# Patient Record
Sex: MALE | Race: WHITE | Employment: OTHER | URBAN - METROPOLITAN AREA
[De-identification: names, ages, dates, MRNs, and addresses within clinical notes are randomized per-mention and may not be internally consistent; named-entity substitution may affect disease eponyms.]

---

## 2017-02-07 ENCOUNTER — OFFICE VISIT (OUTPATIENT)
Dept: CARDIOLOGY CLINIC | Age: 57
End: 2017-02-07

## 2017-02-07 ENCOUNTER — TELEPHONE (OUTPATIENT)
Dept: INTERNAL MEDICINE CLINIC | Age: 57
End: 2017-02-07

## 2017-02-07 VITALS
SYSTOLIC BLOOD PRESSURE: 112 MMHG | HEIGHT: 72 IN | WEIGHT: 247.8 LBS | DIASTOLIC BLOOD PRESSURE: 72 MMHG | HEART RATE: 62 BPM | BODY MASS INDEX: 33.56 KG/M2

## 2017-02-07 DIAGNOSIS — F41.9 ANXIETY: ICD-10-CM

## 2017-02-07 DIAGNOSIS — I48.19 PERSISTENT ATRIAL FIBRILLATION (HCC): ICD-10-CM

## 2017-02-07 DIAGNOSIS — F41.9 ANXIETY: Primary | Chronic | ICD-10-CM

## 2017-02-07 DIAGNOSIS — I48.91 UNSPECIFIED ATRIAL FIBRILLATION (HCC): ICD-10-CM

## 2017-02-07 DIAGNOSIS — Z86.79 S/P ABLATION OF ATRIAL FIBRILLATION: ICD-10-CM

## 2017-02-07 DIAGNOSIS — I48.91 ATRIAL FIBRILLATION WITH RVR (HCC): Primary | ICD-10-CM

## 2017-02-07 DIAGNOSIS — Z98.890 S/P ABLATION OF ATRIAL FIBRILLATION: ICD-10-CM

## 2017-02-07 DIAGNOSIS — I10 ESSENTIAL HYPERTENSION: Chronic | ICD-10-CM

## 2017-02-07 DIAGNOSIS — G47.30 SLEEP APNEA, UNSPECIFIED TYPE: Chronic | ICD-10-CM

## 2017-02-07 PROCEDURE — G8427 DOCREV CUR MEDS BY ELIG CLIN: HCPCS | Performed by: INTERNAL MEDICINE

## 2017-02-07 PROCEDURE — 3017F COLORECTAL CA SCREEN DOC REV: CPT | Performed by: INTERNAL MEDICINE

## 2017-02-07 PROCEDURE — 93000 ELECTROCARDIOGRAM COMPLETE: CPT | Performed by: INTERNAL MEDICINE

## 2017-02-07 PROCEDURE — 99214 OFFICE O/P EST MOD 30 MIN: CPT | Performed by: INTERNAL MEDICINE

## 2017-02-07 PROCEDURE — G8484 FLU IMMUNIZE NO ADMIN: HCPCS | Performed by: INTERNAL MEDICINE

## 2017-02-07 PROCEDURE — 1036F TOBACCO NON-USER: CPT | Performed by: INTERNAL MEDICINE

## 2017-02-07 PROCEDURE — G8419 CALC BMI OUT NRM PARAM NOF/U: HCPCS | Performed by: INTERNAL MEDICINE

## 2017-02-07 RX ORDER — DILTIAZEM HYDROCHLORIDE 120 MG/1
120 CAPSULE, COATED, EXTENDED RELEASE ORAL DAILY
Qty: 90 CAPSULE | Refills: 3 | Status: SHIPPED | OUTPATIENT
Start: 2017-02-07 | End: 2018-02-05 | Stop reason: SDUPTHER

## 2017-02-07 RX ORDER — ALPRAZOLAM 0.5 MG/1
TABLET ORAL
Qty: 30 TABLET | Refills: 2 | Status: SHIPPED | OUTPATIENT
Start: 2017-02-07 | End: 2017-07-18 | Stop reason: SDUPTHER

## 2017-02-12 LAB
6-ACETYLMORPHINE: NOT DETECTED
7-AMINOCLONAZEPAM: NOT DETECTED
ALPHA-OH-ALPRAZOLAM: NOT DETECTED
ALPRAZOLAM: NOT DETECTED
AMPHETAMINE: NOT DETECTED
BARBITURATES: NOT DETECTED
BENZOYLECGONINE: NOT DETECTED
BUPRENORPHINE: NOT DETECTED
CARISOPRODOL: NOT DETECTED
CLONAZEPAM: NOT DETECTED
CODEINE: NOT DETECTED
CREATININE URINE: 158.9 MG/DL (ref 20–400)
DIAZEPAM: NOT DETECTED
DRUGS EXPECTED: NORMAL
EER PAIN MGT DRUG PANEL, HIGH RES/EMIT U: NORMAL
ETHYL GLUCURONIDE: PRESENT
FENTANYL: NOT DETECTED
HYDROCODONE: NOT DETECTED
HYDROMORPHONE: NOT DETECTED
LORAZEPAM: NOT DETECTED
MARIJUANA METABOLITE: PRESENT
MDA: NOT DETECTED
MDEA: NOT DETECTED
MDMA URINE: NOT DETECTED
MEPERIDINE: NOT DETECTED
METHADONE: NOT DETECTED
METHAMPHETAMINE: NOT DETECTED
METHYLPHENIDATE: NOT DETECTED
MIDAZOLAM: NOT DETECTED
MORPHINE: NOT DETECTED
NORBUPRENORPHINE, FREE: NOT DETECTED
NORDIAZEPAM: PRESENT
NORFENTANYL: NOT DETECTED
NORHYDROCODONE, URINE: NOT DETECTED
NOROXYCODONE: NOT DETECTED
NOROXYMORPHONE, URINE: NOT DETECTED
OXAZEPAM: NOT DETECTED
OXYCODONE: NOT DETECTED
OXYMORPHONE: NOT DETECTED
PAIN MANAGEMENT DRUG PANEL: NORMAL
PAIN MANAGEMENT DRUG PANEL: NORMAL
PCP: NOT DETECTED
PHENTERMINE: NOT DETECTED
PROPOXYPHENE: NOT DETECTED
TAPENTADOL, URINE: NOT DETECTED
TAPENTADOL-O-SULFATE, URINE: NOT DETECTED
TEMAZEPAM: PRESENT
TRAMADOL: NOT DETECTED
ZOLPIDEM: NOT DETECTED

## 2017-02-17 ENCOUNTER — OFFICE VISIT (OUTPATIENT)
Dept: INTERNAL MEDICINE CLINIC | Age: 57
End: 2017-02-17

## 2017-02-17 VITALS — HEART RATE: 64 BPM | SYSTOLIC BLOOD PRESSURE: 110 MMHG | DIASTOLIC BLOOD PRESSURE: 76 MMHG

## 2017-02-17 DIAGNOSIS — I10 ESSENTIAL HYPERTENSION: Primary | Chronic | ICD-10-CM

## 2017-02-17 DIAGNOSIS — Z00.00 ROUTINE ADULT HEALTH MAINTENANCE: ICD-10-CM

## 2017-02-17 DIAGNOSIS — M47.812 CERVICAL ARTHRITIS: ICD-10-CM

## 2017-02-17 DIAGNOSIS — I48.19 PERSISTENT ATRIAL FIBRILLATION (HCC): ICD-10-CM

## 2017-02-17 DIAGNOSIS — M16.11 PRIMARY OSTEOARTHRITIS OF RIGHT HIP: ICD-10-CM

## 2017-02-17 LAB — HEPATITIS C ANTIBODY INTERPRETATION: NORMAL

## 2017-02-17 PROCEDURE — 99213 OFFICE O/P EST LOW 20 MIN: CPT | Performed by: INTERNAL MEDICINE

## 2017-02-17 PROCEDURE — 1036F TOBACCO NON-USER: CPT | Performed by: INTERNAL MEDICINE

## 2017-02-17 PROCEDURE — G8484 FLU IMMUNIZE NO ADMIN: HCPCS | Performed by: INTERNAL MEDICINE

## 2017-02-17 PROCEDURE — 3017F COLORECTAL CA SCREEN DOC REV: CPT | Performed by: INTERNAL MEDICINE

## 2017-02-17 PROCEDURE — G8427 DOCREV CUR MEDS BY ELIG CLIN: HCPCS | Performed by: INTERNAL MEDICINE

## 2017-02-17 PROCEDURE — G8417 CALC BMI ABV UP PARAM F/U: HCPCS | Performed by: INTERNAL MEDICINE

## 2017-02-20 LAB — HIV-1 AND HIV-2 ANTIBODIES: NORMAL

## 2017-03-21 ENCOUNTER — HOSPITAL ENCOUNTER (OUTPATIENT)
Dept: OTHER | Age: 57
Discharge: HOME | End: 2017-03-21

## 2017-03-21 DIAGNOSIS — Z00.6 CLINICAL TRIAL EXAM: ICD-10-CM

## 2017-04-24 ENCOUNTER — OFFICE VISIT (OUTPATIENT)
Dept: INTERNAL MEDICINE CLINIC | Age: 57
End: 2017-04-24

## 2017-04-24 VITALS
DIASTOLIC BLOOD PRESSURE: 82 MMHG | WEIGHT: 241 LBS | SYSTOLIC BLOOD PRESSURE: 122 MMHG | HEART RATE: 72 BPM | BODY MASS INDEX: 32.69 KG/M2

## 2017-04-24 DIAGNOSIS — J40 BRONCHITIS: ICD-10-CM

## 2017-04-24 DIAGNOSIS — J01.00 ACUTE MAXILLARY SINUSITIS, RECURRENCE NOT SPECIFIED: Primary | ICD-10-CM

## 2017-04-24 PROCEDURE — 3017F COLORECTAL CA SCREEN DOC REV: CPT | Performed by: INTERNAL MEDICINE

## 2017-04-24 PROCEDURE — G8417 CALC BMI ABV UP PARAM F/U: HCPCS | Performed by: INTERNAL MEDICINE

## 2017-04-24 PROCEDURE — 99213 OFFICE O/P EST LOW 20 MIN: CPT | Performed by: INTERNAL MEDICINE

## 2017-04-24 PROCEDURE — 1036F TOBACCO NON-USER: CPT | Performed by: INTERNAL MEDICINE

## 2017-04-24 PROCEDURE — G8427 DOCREV CUR MEDS BY ELIG CLIN: HCPCS | Performed by: INTERNAL MEDICINE

## 2017-04-24 RX ORDER — PROMETHAZINE HYDROCHLORIDE AND PHENYLEPHRINE HYDROCHLORIDE 6.25; 5 MG/5ML; MG/5ML
5 SYRUP ORAL EVERY 6 HOURS
Qty: 280 ML | Refills: 0 | Status: SHIPPED | OUTPATIENT
Start: 2017-04-24 | End: 2017-12-01 | Stop reason: ALTCHOICE

## 2017-04-24 RX ORDER — DOXYCYCLINE HYCLATE 100 MG
100 TABLET ORAL 2 TIMES DAILY
Qty: 20 TABLET | Refills: 0 | Status: SHIPPED | OUTPATIENT
Start: 2017-04-24 | End: 2017-05-04

## 2017-05-17 RX ORDER — RIVAROXABAN 20 MG/1
TABLET, FILM COATED ORAL
Qty: 30 TABLET | Refills: 2 | Status: SHIPPED | OUTPATIENT
Start: 2017-05-17 | End: 2017-07-18 | Stop reason: SDUPTHER

## 2017-07-06 RX ORDER — LISINOPRIL 20 MG/1
TABLET ORAL
Qty: 90 TABLET | Refills: 1 | Status: SHIPPED | OUTPATIENT
Start: 2017-07-06 | End: 2018-09-11

## 2017-07-18 ENCOUNTER — OFFICE VISIT (OUTPATIENT)
Dept: CARDIOLOGY CLINIC | Age: 57
End: 2017-07-18

## 2017-07-18 VITALS
SYSTOLIC BLOOD PRESSURE: 118 MMHG | DIASTOLIC BLOOD PRESSURE: 72 MMHG | OXYGEN SATURATION: 96 % | HEART RATE: 56 BPM | BODY MASS INDEX: 32.51 KG/M2 | WEIGHT: 240 LBS | HEIGHT: 72 IN

## 2017-07-18 DIAGNOSIS — I10 ESSENTIAL HYPERTENSION: Chronic | ICD-10-CM

## 2017-07-18 DIAGNOSIS — Z86.79 S/P ABLATION OF ATRIAL FIBRILLATION: ICD-10-CM

## 2017-07-18 DIAGNOSIS — I48.91 ATRIAL FIBRILLATION WITH RVR (HCC): Primary | ICD-10-CM

## 2017-07-18 DIAGNOSIS — Z98.890 S/P ABLATION OF ATRIAL FIBRILLATION: ICD-10-CM

## 2017-07-18 DIAGNOSIS — I48.0 PAROXYSMAL ATRIAL FIBRILLATION (HCC): ICD-10-CM

## 2017-07-18 DIAGNOSIS — G47.30 SLEEP APNEA, UNSPECIFIED TYPE: Chronic | ICD-10-CM

## 2017-07-18 PROCEDURE — 3017F COLORECTAL CA SCREEN DOC REV: CPT | Performed by: INTERNAL MEDICINE

## 2017-07-18 PROCEDURE — G8417 CALC BMI ABV UP PARAM F/U: HCPCS | Performed by: INTERNAL MEDICINE

## 2017-07-18 PROCEDURE — 99214 OFFICE O/P EST MOD 30 MIN: CPT | Performed by: INTERNAL MEDICINE

## 2017-07-18 PROCEDURE — G8427 DOCREV CUR MEDS BY ELIG CLIN: HCPCS | Performed by: INTERNAL MEDICINE

## 2017-07-18 PROCEDURE — 1036F TOBACCO NON-USER: CPT | Performed by: INTERNAL MEDICINE

## 2017-07-18 PROCEDURE — 93000 ELECTROCARDIOGRAM COMPLETE: CPT | Performed by: INTERNAL MEDICINE

## 2017-07-18 RX ORDER — ASPIRIN 325 MG
325 TABLET, DELAYED RELEASE (ENTERIC COATED) ORAL DAILY
Qty: 90 TABLET | Refills: 3
Start: 2017-07-18 | End: 2020-02-06 | Stop reason: ALTCHOICE

## 2017-07-18 RX ORDER — PROPAFENONE HYDROCHLORIDE 225 MG/1
CAPSULE, EXTENDED RELEASE ORAL
Qty: 180 CAPSULE | Refills: 2 | Status: SHIPPED | OUTPATIENT
Start: 2017-07-18 | End: 2018-05-03 | Stop reason: SDUPTHER

## 2017-08-07 ENCOUNTER — TELEPHONE (OUTPATIENT)
Dept: CARDIOLOGY CLINIC | Age: 57
End: 2017-08-07

## 2017-08-18 ENCOUNTER — OFFICE VISIT (OUTPATIENT)
Dept: INTERNAL MEDICINE CLINIC | Age: 57
End: 2017-08-18

## 2017-08-18 VITALS
HEART RATE: 64 BPM | BODY MASS INDEX: 32.51 KG/M2 | DIASTOLIC BLOOD PRESSURE: 78 MMHG | SYSTOLIC BLOOD PRESSURE: 134 MMHG | HEIGHT: 72 IN | WEIGHT: 240 LBS

## 2017-08-18 DIAGNOSIS — F32.9 REACTIVE DEPRESSION: ICD-10-CM

## 2017-08-18 DIAGNOSIS — I10 ESSENTIAL HYPERTENSION: Chronic | ICD-10-CM

## 2017-08-18 DIAGNOSIS — R25.1 TREMOR: ICD-10-CM

## 2017-08-18 DIAGNOSIS — I48.0 PAROXYSMAL ATRIAL FIBRILLATION (HCC): Primary | ICD-10-CM

## 2017-08-18 DIAGNOSIS — D75.89 MACROCYTOSIS WITHOUT ANEMIA: Chronic | ICD-10-CM

## 2017-08-18 PROCEDURE — G8427 DOCREV CUR MEDS BY ELIG CLIN: HCPCS | Performed by: INTERNAL MEDICINE

## 2017-08-18 PROCEDURE — 99213 OFFICE O/P EST LOW 20 MIN: CPT | Performed by: INTERNAL MEDICINE

## 2017-08-18 PROCEDURE — 1036F TOBACCO NON-USER: CPT | Performed by: INTERNAL MEDICINE

## 2017-08-18 PROCEDURE — G8417 CALC BMI ABV UP PARAM F/U: HCPCS | Performed by: INTERNAL MEDICINE

## 2017-08-18 PROCEDURE — 3017F COLORECTAL CA SCREEN DOC REV: CPT | Performed by: INTERNAL MEDICINE

## 2017-08-18 RX ORDER — METOPROLOL TARTRATE 100 MG/1
TABLET ORAL
Qty: 180 TABLET | Refills: 3 | Status: SHIPPED | OUTPATIENT
Start: 2017-08-18 | End: 2018-09-15 | Stop reason: SDUPTHER

## 2017-08-18 ASSESSMENT — PATIENT HEALTH QUESTIONNAIRE - PHQ9
2. FEELING DOWN, DEPRESSED OR HOPELESS: 0
1. LITTLE INTEREST OR PLEASURE IN DOING THINGS: 0
SUM OF ALL RESPONSES TO PHQ9 QUESTIONS 1 & 2: 0
SUM OF ALL RESPONSES TO PHQ QUESTIONS 1-9: 0

## 2017-09-21 ENCOUNTER — OFFICE VISIT (OUTPATIENT)
Dept: CARDIOLOGY CLINIC | Age: 57
End: 2017-09-21

## 2017-09-21 VITALS
SYSTOLIC BLOOD PRESSURE: 112 MMHG | HEART RATE: 51 BPM | BODY MASS INDEX: 32.72 KG/M2 | DIASTOLIC BLOOD PRESSURE: 78 MMHG | WEIGHT: 241.25 LBS

## 2017-09-21 DIAGNOSIS — I10 ESSENTIAL HYPERTENSION: Chronic | ICD-10-CM

## 2017-09-21 DIAGNOSIS — G47.30 SLEEP APNEA, UNSPECIFIED TYPE: Chronic | ICD-10-CM

## 2017-09-21 DIAGNOSIS — I48.0 PAROXYSMAL ATRIAL FIBRILLATION (HCC): Primary | ICD-10-CM

## 2017-09-21 PROCEDURE — 99214 OFFICE O/P EST MOD 30 MIN: CPT | Performed by: NURSE PRACTITIONER

## 2017-09-21 PROCEDURE — 93000 ELECTROCARDIOGRAM COMPLETE: CPT | Performed by: NURSE PRACTITIONER

## 2017-10-20 ENCOUNTER — OFFICE VISIT (OUTPATIENT)
Dept: INTERNAL MEDICINE CLINIC | Age: 57
End: 2017-10-20

## 2017-10-20 VITALS
DIASTOLIC BLOOD PRESSURE: 70 MMHG | HEART RATE: 56 BPM | WEIGHT: 234 LBS | BODY MASS INDEX: 31.69 KG/M2 | SYSTOLIC BLOOD PRESSURE: 122 MMHG | HEIGHT: 72 IN

## 2017-10-20 DIAGNOSIS — G25.0 ESSENTIAL TREMOR: ICD-10-CM

## 2017-10-20 DIAGNOSIS — I10 ESSENTIAL HYPERTENSION: Primary | Chronic | ICD-10-CM

## 2017-10-20 DIAGNOSIS — F41.9 ANXIETY: Chronic | ICD-10-CM

## 2017-10-20 PROCEDURE — 99213 OFFICE O/P EST LOW 20 MIN: CPT | Performed by: INTERNAL MEDICINE

## 2017-10-20 PROCEDURE — G8484 FLU IMMUNIZE NO ADMIN: HCPCS | Performed by: INTERNAL MEDICINE

## 2017-10-20 PROCEDURE — 3017F COLORECTAL CA SCREEN DOC REV: CPT | Performed by: INTERNAL MEDICINE

## 2017-10-20 PROCEDURE — G8417 CALC BMI ABV UP PARAM F/U: HCPCS | Performed by: INTERNAL MEDICINE

## 2017-10-20 PROCEDURE — 1036F TOBACCO NON-USER: CPT | Performed by: INTERNAL MEDICINE

## 2017-10-20 PROCEDURE — G8427 DOCREV CUR MEDS BY ELIG CLIN: HCPCS | Performed by: INTERNAL MEDICINE

## 2017-10-20 RX ORDER — AMOXICILLIN 500 MG/1
CAPSULE ORAL
Refills: 0 | COMMUNITY
Start: 2017-10-18 | End: 2017-12-01 | Stop reason: ALTCHOICE

## 2017-10-20 RX ORDER — PRIMIDONE 50 MG/1
50 TABLET ORAL NIGHTLY
Qty: 30 TABLET | Refills: 3 | Status: SHIPPED | OUTPATIENT
Start: 2017-10-20 | End: 2018-09-11

## 2017-10-20 RX ORDER — CLINDAMYCIN HYDROCHLORIDE 150 MG/1
CAPSULE ORAL
Refills: 0 | COMMUNITY
Start: 2017-10-18 | End: 2017-12-11 | Stop reason: ALTCHOICE

## 2017-10-27 DIAGNOSIS — F41.9 ANXIETY: ICD-10-CM

## 2017-10-27 RX ORDER — ALPRAZOLAM 0.5 MG/1
TABLET ORAL
Qty: 20 TABLET | Refills: 0 | Status: SHIPPED | OUTPATIENT
Start: 2017-10-27 | End: 2017-12-11 | Stop reason: DRUGHIGH

## 2017-12-01 ENCOUNTER — OFFICE VISIT (OUTPATIENT)
Dept: INTERNAL MEDICINE CLINIC | Age: 57
End: 2017-12-01

## 2017-12-01 VITALS
BODY MASS INDEX: 31.74 KG/M2 | DIASTOLIC BLOOD PRESSURE: 76 MMHG | WEIGHT: 234 LBS | HEART RATE: 60 BPM | SYSTOLIC BLOOD PRESSURE: 120 MMHG

## 2017-12-01 DIAGNOSIS — G25.0 DISABLING ESSENTIAL TREMOR: Chronic | ICD-10-CM

## 2017-12-01 DIAGNOSIS — I10 ESSENTIAL HYPERTENSION: Primary | Chronic | ICD-10-CM

## 2017-12-01 PROCEDURE — G8427 DOCREV CUR MEDS BY ELIG CLIN: HCPCS | Performed by: INTERNAL MEDICINE

## 2017-12-01 PROCEDURE — 3017F COLORECTAL CA SCREEN DOC REV: CPT | Performed by: INTERNAL MEDICINE

## 2017-12-01 PROCEDURE — 99213 OFFICE O/P EST LOW 20 MIN: CPT | Performed by: INTERNAL MEDICINE

## 2017-12-01 PROCEDURE — 1036F TOBACCO NON-USER: CPT | Performed by: INTERNAL MEDICINE

## 2017-12-01 PROCEDURE — G8417 CALC BMI ABV UP PARAM F/U: HCPCS | Performed by: INTERNAL MEDICINE

## 2017-12-01 PROCEDURE — G8484 FLU IMMUNIZE NO ADMIN: HCPCS | Performed by: INTERNAL MEDICINE

## 2017-12-01 NOTE — PROGRESS NOTES
SUBJECTIVE:  Patient ID: Isidro Guevara is a 61 y.o. y.o. male     HPI    Isidro Guevara returns for follow up of hypertension. Patient has been taking His medications as prescribed. Patient's blood pressure is  controlled. Side effects related to taking the medications include no medication side effects noted    His essential tremor is worsening despite taking Mysoline    Review of Systems    OBJECTIVE:    /76   Pulse 60   Wt 234 lb (106.1 kg)   BMI 31.74 kg/m²    Physical Exam   Constitutional: He is oriented to person, place, and time. He appears well-developed and well-nourished. No distress. HENT:   Head: Normocephalic and atraumatic. Pulmonary/Chest: Effort normal. No respiratory distress. Neurological: He is alert and oriented to person, place, and time. No cranial nerve deficit. Skin: Skin is warm and dry. He is not diaphoretic. Psychiatric: He has a normal mood and affect. His behavior is normal. Judgment and thought content normal.   Vitals reviewed.        Current Outpatient Prescriptions:     ALPRAZolam (XANAX) 0.5 MG tablet, TAKE 1 TABLET BY MOUTH NIGHTLY AS NEEDED, Disp: 20 tablet, Rfl: 0    amoxicillin (AMOXIL) 500 MG capsule, TK 4 CS PO 1 HOUR BEFORE DENTAL APPOINTMENT, Disp: , Rfl: 0    clindamycin (CLEOCIN) 150 MG capsule, TK 1 C PO QID  UNTIL GONE, Disp: , Rfl: 0    primidone (MYSOLINE) 50 MG tablet, Take 1 tablet by mouth nightly, Disp: 30 tablet, Rfl: 3    metoprolol (LOPRESSOR) 100 MG tablet, TAKE 1 TABLET TWICE A DAY, Disp: 180 tablet, Rfl: 3    propafenone (RYTHMOL SR) 225 MG extended release capsule, TAKE 1 CAPSULE BY MOUTH TWICE A DAY, Disp: 180 capsule, Rfl: 2    aspirin 325 MG EC tablet, Take 1 tablet by mouth daily, Disp: 90 tablet, Rfl: 3    lisinopril (PRINIVIL;ZESTRIL) 20 MG tablet, TAKE 1 TABLET ONCE DAILY, Disp: 90 tablet, Rfl: 1    Promethazine-Phenylephrine (PHENERGAN-VC) 6.25-5 MG/5ML syrup, Take 5 mLs by mouth every 6 hours, Disp: 280 mL, Rfl:

## 2017-12-11 ENCOUNTER — OFFICE VISIT (OUTPATIENT)
Dept: PSYCHIATRY | Age: 57
End: 2017-12-11

## 2017-12-11 VITALS
SYSTOLIC BLOOD PRESSURE: 112 MMHG | BODY MASS INDEX: 32.13 KG/M2 | HEIGHT: 72 IN | DIASTOLIC BLOOD PRESSURE: 76 MMHG | HEART RATE: 60 BPM | WEIGHT: 237.2 LBS

## 2017-12-11 DIAGNOSIS — F43.22 ADJUSTMENT DISORDER WITH ANXIOUS MOOD: Primary | ICD-10-CM

## 2017-12-11 PROCEDURE — 99204 OFFICE O/P NEW MOD 45 MIN: CPT | Performed by: PSYCHIATRY & NEUROLOGY

## 2017-12-11 RX ORDER — TRAZODONE HYDROCHLORIDE 50 MG/1
50 TABLET ORAL NIGHTLY
Qty: 30 TABLET | Refills: 1 | Status: SHIPPED | OUTPATIENT
Start: 2017-12-11 | End: 2018-01-29

## 2017-12-11 RX ORDER — ALPRAZOLAM 0.25 MG/1
0.12 TABLET ORAL NIGHTLY PRN
Qty: 7 TABLET | Refills: 0 | Status: SHIPPED | OUTPATIENT
Start: 2017-12-11 | End: 2018-01-10

## 2017-12-11 ASSESSMENT — ANXIETY QUESTIONNAIRES
1. FEELING NERVOUS, ANXIOUS, OR ON EDGE: 3-NEARLY EVERY DAY
4. TROUBLE RELAXING: 3-NEARLY EVERY DAY
5. BEING SO RESTLESS THAT IT IS HARD TO SIT STILL: 3-NEARLY EVERY DAY
2. NOT BEING ABLE TO STOP OR CONTROL WORRYING: 2-OVER HALF THE DAYS
GAD7 TOTAL SCORE: 17
6. BECOMING EASILY ANNOYED OR IRRITABLE: 1-SEVERAL DAYS
7. FEELING AFRAID AS IF SOMETHING AWFUL MIGHT HAPPEN: 3-NEARLY EVERY DAY
3. WORRYING TOO MUCH ABOUT DIFFERENT THINGS: 2-OVER HALF THE DAYS

## 2017-12-11 NOTE — PROGRESS NOTES
context: Pt is a 60 yo M with hx of anxiety, insomnia, sleep apnea, a.fib, essential tremor presenting as a referral from Dr. Neo Hope for evaluation of chronic benzodiazepine use. Pt has been on alprazolam for over 10 yrs, was originally started for anxiety d/t situational stressors he was having at the time related to work, but he continued it only at night as it helped him sleep and has maintained a low dose 0.5mg qhs for sleep since that time. Over the last month he has reduced it to 0.25mg at bedtime, and will occasionally use 0.5mg, but has tolerated the reduction well. associated symptoms:   Pt reports problems with anxiety, often feels nervous and on edge, has trouble controlling worry, trouble relaxing, problems with restlessness and a fear something awful may happen. He mostly worries about his health conditions and what health issues he may get in the future. He also worries about his mother and her physical health and his ability to be able to care for her as he feels he struggles just caring for himself. He reports periods in the past of depressed mood, but generally does not complain of depression. His energy varies based on the day and activity he is doing, but he will usually always need a 30-60 min nap each day. His is future oriented and hopeful about the future, feels the issues he is having will naturally get better. He denies SI and anhedonia. He's had issues with anxiety since he was young, and they have been worse at various times d/t stressors in his life (divorce, work, etc)    modifying factors:   He's had health issues he's struggled with, particularly a hip replacement 1 yr ago and ankle surgery that made mobility difficult. Pt went through a period of at least 2 yrs of drinking heavily about 4-6 drinks per night, partly to self medicate for pain d/t his surgery as he could not tolerate opioids. He also picked up smoking MJ every couple days.  He recently stopped drinking for 7 weeks as he recognized his drinking was a problem and did ok during this time, but he has returned to drinking about 2-3 drinks per night. He retired 3 yrs ago. He has had some difficulty adjusting to this as hie long-term has not been something he planned well for and has not been as relaxing as he expected d/t his health issues. He has not tolerated various CPAP equipment, so has not been on treatment for his KATELIN for the last 4 yrs. His physical pain issues do wake him up at various times in the night. Generally spends his time on the couch watching TV. Brother  early this year. Timing: chronic  duration: increased over the last 3 yrs  severity: mild to moderate    ROS:   GEN: no fevers, no fatigue HEENT: no headache, no vision or hearing prob, no sore throat CV: no cp, no palpitations, no edema RESP: no dyspnea : no dysuria, no hematuria MSK: some hip pain and slowed gait GI: no N/V/D Skin: no rashes NEURO: +essential tremor, no headache, occasional numbness in arm in the middle of sleep ENDO:  no wt changes    Past Psychiatric History:   Hosp: none previously  Med trials: alprazolam 0.5mg qhs.  No other medication trials  No prior psychiatric treatment    Past Medical History:   Diagnosis Date    Anxiety     Atrial fibrillation ([facility]     Atrial flutter (HCC)     Cervical arthritis ([facility] [facility] 3/30/2012    CTS (carpal tunnel syndrome) 3/13/2013    Hypertension     KATELIN (obstructive sleep apnea)     DOES NOT USE C-PAP    Osteoarthritis of back 2014    Osteoarthritis of right hip 2014    S/P ablation of atrial fibrillation 2016    Radiofrequency ablation of atrial fibrillation and pulmonary veins isolation Additional ablation of complex atrial fractionated electrogram     Sinusitis     Trigger finger 12/10/2014     Past Surgical History:   Procedure Laterality Date    ATRIAL ABLATION SURGERY      ATRIAL ABLATION SURGERY  16    RFCA of AF and PVI, additional Height: 6' (1.829 m)     FRANKIE 7 SCORE 2017   FRANKIE-7 Total Score 17     Interpretation of FRANKIE-7 score: 5-9 = mild anxiety, 10-14 = moderate anxiety, 15+ = severe anxiety. Recommend referral to behavioral health for scores 10 or greater. MSE:   Appearance    alert, cooperative  Motor: Normal strength and tone, No abnormal movements, tics or mannerisms.   Speech    spontaneous, normal rate and normal volume  Language    0 - no aphasia, normal  Mood/Affect    Anxious / full quality, good range  Thought Process    linear, goal directed and coherent  Thought Content    future oriented, no suicidal ideation  Associations    logical connections  Attention/Concentration    intact  Orientation    oriented to person, place, time, and general circumstances  Memory    recent and remote memory intact  Fund of Knowledge    intact  Insight/Judgement    fair / Intact    Labs:   Lab Results   Component Value Date    CHOL 174 2015    CHOL 165 2014    CHOL 208 (H) 2011     Lab Results   Component Value Date    TRIG 187 (H) 2015    TRIG 134 2014    TRIG 305 (H) 2011     Lab Results   Component Value Date    HDL 33 (L) 2015    HDL 39 (L) 2014    HDL 42 2011     Lab Results   Component Value Date    LDLCALC 104 (H) 2015    LDLCALC 99 2014     Lab Results   Component Value Date    LABVLDL 37 2015    LABVLDL 27 2014     No results found for: CHOLHDLRATIO    No results found for: LABA1C  No results found for: EAG    Lab Results   Component Value Date    TSH 2.01 2015       No results found for: [id number]  No results found for: FOLATE    No results found for: [id number]    UDS 2017: +ethyl glucuronide, +MJ    Imaging:   No head imaging on file    EK2017: rate 51 bpm, sinus bradycardia, QTc 411ms        Veena Saez MD   Psychiatry

## 2018-01-23 ENCOUNTER — OFFICE VISIT (OUTPATIENT)
Dept: CARDIOLOGY CLINIC | Age: 58
End: 2018-01-23

## 2018-01-23 VITALS
HEART RATE: 62 BPM | SYSTOLIC BLOOD PRESSURE: 120 MMHG | DIASTOLIC BLOOD PRESSURE: 74 MMHG | HEIGHT: 72 IN | BODY MASS INDEX: 32.21 KG/M2 | OXYGEN SATURATION: 97 % | WEIGHT: 237.8 LBS

## 2018-01-23 DIAGNOSIS — G47.33 OBSTRUCTIVE SLEEP APNEA SYNDROME: Chronic | ICD-10-CM

## 2018-01-23 DIAGNOSIS — I10 ESSENTIAL HYPERTENSION: Chronic | ICD-10-CM

## 2018-01-23 DIAGNOSIS — E66.09 CLASS 1 OBESITY DUE TO EXCESS CALORIES WITH BODY MASS INDEX (BMI) OF 32.0 TO 32.9 IN ADULT, UNSPECIFIED WHETHER SERIOUS COMORBIDITY PRESENT: ICD-10-CM

## 2018-01-23 DIAGNOSIS — I48.0 PAROXYSMAL ATRIAL FIBRILLATION (HCC): Primary | ICD-10-CM

## 2018-01-23 PROCEDURE — G8417 CALC BMI ABV UP PARAM F/U: HCPCS | Performed by: INTERNAL MEDICINE

## 2018-01-23 PROCEDURE — G8427 DOCREV CUR MEDS BY ELIG CLIN: HCPCS | Performed by: INTERNAL MEDICINE

## 2018-01-23 PROCEDURE — 99214 OFFICE O/P EST MOD 30 MIN: CPT | Performed by: INTERNAL MEDICINE

## 2018-01-23 PROCEDURE — 93000 ELECTROCARDIOGRAM COMPLETE: CPT | Performed by: INTERNAL MEDICINE

## 2018-01-23 PROCEDURE — 3017F COLORECTAL CA SCREEN DOC REV: CPT | Performed by: INTERNAL MEDICINE

## 2018-01-23 PROCEDURE — G8484 FLU IMMUNIZE NO ADMIN: HCPCS | Performed by: INTERNAL MEDICINE

## 2018-01-23 PROCEDURE — 1036F TOBACCO NON-USER: CPT | Performed by: INTERNAL MEDICINE

## 2018-01-23 NOTE — PROGRESS NOTES
mitral regurgitation is present.  Christean Chroman is no evidence of mass or thrombus in the left atrium or appendage.   The aortic root is mildly dilated. 3/2013  Preserved LV systolic function with EF of 55%  Reversal of E/A inflow velocities across the mitral valve suggesting impaired left ventricular relaxation. Mild mitral regurgitation is present. Mildly dilated left atrium. Review of System:  All other systems reviewed and are negative except for that noted above. Pertinent negatives are:     · General: negative for fever, chills   · Ophthalmic ROS: negative for - eye pain or loss of vision  · ENT ROS: negative for - headaches, sore throat   · Respiratory: negative for - cough, sputum  · Cardiovascular: Reviewed in HPI  · Gastrointestinal: negative for - abdominal pain, diarrhea, N/V  · Hematology: negative for - bleeding, blood clots, bruising or jaundice  · Genito-Urinary:  negative for - Dysuria or incontinence  · Musculoskeletal: negative for - Joint swelling, muscle pain  · Neurological: negative for - confusion, dizziness, headaches   · Psychiatric: No anxiety, no depression. · Dermatological: negative for - rash      Physical Examination:    /74 (Site: Left Arm, Position: Sitting, Cuff Size: Large Adult)   Pulse 62   Ht 6' (1.829 m)   Wt 237 lb 12.8 oz (107.9 kg)   SpO2 97%   BMI 32.25 kg/m²     Vitals:    01/23/18 1046   BP: 120/74   Pulse: 62   SpO2: 97%       Wt Readings from Last 3 Encounters:   01/23/18 237 lb 12.8 oz (107.9 kg)   12/11/17 237 lb 3.2 oz (107.6 kg)   12/01/17 234 lb (106.1 kg)     · Constitutional: Oriented. No distress. · Head: Normocephalic and atraumatic. · Mouth/Throat: Oropharynx is clear and moist.   · Eyes: Conjunctivae normal. EOM are normal.   · Neck: Neck supple. No rigidity. No JVD present. · Cardiovascular: regular rhythm, S1&S2. · Pulmonary/Chest: Bilateral respiratory sounds. No wheezes, No rhonchi. · Abdominal: Soft. Bowel sounds present.  No

## 2018-01-29 PROBLEM — G25.0 BENIGN ESSENTIAL TREMOR: Status: ACTIVE | Noted: 2018-01-29

## 2018-01-29 PROBLEM — G20 PARKINSONIAN TREMOR (HCC): Status: ACTIVE | Noted: 2018-01-29

## 2018-01-29 PROBLEM — G20.C PARKINSONIAN TREMOR: Status: ACTIVE | Noted: 2018-01-29

## 2018-02-05 DIAGNOSIS — I48.91 UNSPECIFIED ATRIAL FIBRILLATION (HCC): ICD-10-CM

## 2018-02-05 RX ORDER — DILTIAZEM HYDROCHLORIDE 120 MG/1
120 CAPSULE, COATED, EXTENDED RELEASE ORAL DAILY
Qty: 90 CAPSULE | Refills: 3 | Status: SHIPPED | OUTPATIENT
Start: 2018-02-05 | End: 2019-01-09 | Stop reason: SDUPTHER

## 2018-02-05 NOTE — TELEPHONE ENCOUNTER
Last ov 1/23/18  Pending appt 7/17/18  Last refill 2/7/17 #90x3      Patient Active Problem List   Diagnosis    Essential hypertension    Obstructive sleep apnea syndrome    Paroxysmal atrial fibrillation (HCC)    Macrocytosis without anemia    Disabling essential tremor      -Tremor              New problem              Limited to his left arm. Has appt with neurology 1/29/18              onset in the past 2.5 months, becoming progressively worse              Propafenone rarely can cause tremor, however he's been on propranolol for more than 2 years. Discussed with patient. If his neurologist recommended discontinuing propafenone, it is ok to do it.      - Atrial fibrillation/ flutter               - S/p PVI and CFAE ablation with restoration of sinus rhythm on 2/25/16              - Propafenone  mg twice daily              - Cardizem, metoprolol              - xarelto stopped at last visit              -Continue  mg daily. - Can take Xarelto prn if A fib recurs as patient believes he will recognize A fib again. No recurrence.      - Hypertension:               Stable. Continue with current medications. /74 (Site: Left Arm, Position: Sitting, Cuff Size: Large Adult)   Pulse 62   Ht 6' (1.829 m)   Wt 237 lb 12.8 oz (107.9 kg)   SpO2 97%   BMI 32.25 kg/m²      - KATELIN: intolerant of CPAP              - Refuses to wear     - Obesity: Body mass index is 32.25 kg/m². - Weight loss, exercise, dietary changes     Follow up in six months     - The patient is counseled to follow a low salt diet to assure blood pressure remains controlled for cardiovascular risk factor modification.   - The patient is counseled to avoid excess caffeine, and energy drinks as this may exacerbated ectopy and arrhythmia. - The patient is counseled to get regular exercise 3-5 times per week to control cardiovascular risk factors.    - The patient is counseled to lose weight to control cardiovascular risk factors.      Follow up in 6 months     Carmen Dent MD, MPH  [facility]    Office: [phone]

## 2018-02-09 ENCOUNTER — OFFICE VISIT (OUTPATIENT)
Dept: INTERNAL MEDICINE CLINIC | Age: 58
End: 2018-02-09

## 2018-02-09 VITALS
DIASTOLIC BLOOD PRESSURE: 72 MMHG | BODY MASS INDEX: 31.87 KG/M2 | WEIGHT: 235 LBS | SYSTOLIC BLOOD PRESSURE: 116 MMHG | RESPIRATION RATE: 12 BRPM | HEART RATE: 60 BPM

## 2018-02-09 DIAGNOSIS — G25.0 BENIGN ESSENTIAL TREMOR: ICD-10-CM

## 2018-02-09 DIAGNOSIS — I10 ESSENTIAL HYPERTENSION: Chronic | ICD-10-CM

## 2018-02-09 DIAGNOSIS — I48.0 PAROXYSMAL ATRIAL FIBRILLATION (HCC): ICD-10-CM

## 2018-02-09 DIAGNOSIS — G20 PARKINSONIAN TREMOR (HCC): Primary | ICD-10-CM

## 2018-02-09 PROCEDURE — 99213 OFFICE O/P EST LOW 20 MIN: CPT | Performed by: INTERNAL MEDICINE

## 2018-02-09 PROCEDURE — G8484 FLU IMMUNIZE NO ADMIN: HCPCS | Performed by: INTERNAL MEDICINE

## 2018-02-09 PROCEDURE — G8417 CALC BMI ABV UP PARAM F/U: HCPCS | Performed by: INTERNAL MEDICINE

## 2018-02-09 PROCEDURE — G8427 DOCREV CUR MEDS BY ELIG CLIN: HCPCS | Performed by: INTERNAL MEDICINE

## 2018-02-09 PROCEDURE — 1036F TOBACCO NON-USER: CPT | Performed by: INTERNAL MEDICINE

## 2018-02-09 PROCEDURE — 3017F COLORECTAL CA SCREEN DOC REV: CPT | Performed by: INTERNAL MEDICINE

## 2018-02-19 ENCOUNTER — TELEPHONE (OUTPATIENT)
Dept: INTERNAL MEDICINE CLINIC | Age: 58
End: 2018-02-19

## 2018-02-19 DIAGNOSIS — G20 PARKINSONIAN TREMOR (HCC): ICD-10-CM

## 2018-02-19 NOTE — TELEPHONE ENCOUNTER
Pt cannot get into see the new neurologist until May 31,2018. He has ran out of his Sinemet and needs this refilled today.

## 2018-03-21 ENCOUNTER — TELEPHONE (OUTPATIENT)
Dept: PSYCHIATRY | Age: 58
End: 2018-03-21

## 2018-03-21 RX ORDER — TRAZODONE HYDROCHLORIDE 50 MG/1
50 TABLET ORAL NIGHTLY
Qty: 90 TABLET | Refills: 0 | Status: SHIPPED | OUTPATIENT
Start: 2018-03-21 | End: 2018-06-01

## 2018-05-03 RX ORDER — PROPAFENONE HYDROCHLORIDE 225 MG/1
CAPSULE, EXTENDED RELEASE ORAL
Qty: 180 CAPSULE | Refills: 2 | Status: SHIPPED | OUTPATIENT
Start: 2018-05-03 | End: 2018-07-17 | Stop reason: ALTCHOICE

## 2018-06-01 ENCOUNTER — HOSPITAL ENCOUNTER (OUTPATIENT)
Dept: OTHER | Age: 58
Discharge: HOME | End: 2018-06-01
Attending: INTERNAL MEDICINE | Admitting: INTERNAL MEDICINE

## 2018-06-01 ENCOUNTER — OFFICE VISIT (OUTPATIENT)
Dept: INTERNAL MEDICINE CLINIC | Age: 58
End: 2018-06-01

## 2018-06-01 VITALS
TEMPERATURE: 98.4 F | WEIGHT: 235 LBS | HEART RATE: 72 BPM | BODY MASS INDEX: 31.87 KG/M2 | SYSTOLIC BLOOD PRESSURE: 114 MMHG | DIASTOLIC BLOOD PRESSURE: 80 MMHG

## 2018-06-01 DIAGNOSIS — J45.909 REACTIVE AIRWAY DISEASE WITHOUT COMPLICATION, UNSPECIFIED ASTHMA SEVERITY, UNSPECIFIED WHETHER PERSISTENT: ICD-10-CM

## 2018-06-01 DIAGNOSIS — R06.2 WHEEZING: ICD-10-CM

## 2018-06-01 DIAGNOSIS — R93.89 ABNORMAL CXR: Primary | ICD-10-CM

## 2018-06-01 DIAGNOSIS — J31.0 RHINITIS, UNSPECIFIED CHRONICITY, UNSPECIFIED TYPE: ICD-10-CM

## 2018-06-01 DIAGNOSIS — J45.909 REACTIVE AIRWAY DISEASE WITHOUT COMPLICATION, UNSPECIFIED ASTHMA SEVERITY, UNSPECIFIED WHETHER PERSISTENT: Primary | ICD-10-CM

## 2018-06-01 DIAGNOSIS — J20.9 ACUTE BRONCHITIS, UNSPECIFIED ORGANISM: ICD-10-CM

## 2018-06-01 PROCEDURE — 1036F TOBACCO NON-USER: CPT | Performed by: INTERNAL MEDICINE

## 2018-06-01 PROCEDURE — G8427 DOCREV CUR MEDS BY ELIG CLIN: HCPCS | Performed by: INTERNAL MEDICINE

## 2018-06-01 PROCEDURE — 3017F COLORECTAL CA SCREEN DOC REV: CPT | Performed by: INTERNAL MEDICINE

## 2018-06-01 PROCEDURE — 99213 OFFICE O/P EST LOW 20 MIN: CPT | Performed by: INTERNAL MEDICINE

## 2018-06-01 PROCEDURE — G8417 CALC BMI ABV UP PARAM F/U: HCPCS | Performed by: INTERNAL MEDICINE

## 2018-06-01 RX ORDER — PREDNISONE 20 MG/1
50 TABLET ORAL DAILY
Qty: 13 TABLET | Refills: 0 | Status: SHIPPED | OUTPATIENT
Start: 2018-06-01 | End: 2018-06-06

## 2018-06-01 RX ORDER — FLUTICASONE PROPIONATE 50 MCG
1 SPRAY, SUSPENSION (ML) NASAL DAILY
Qty: 1 BOTTLE | Refills: 3 | Status: SHIPPED | OUTPATIENT
Start: 2018-06-01 | End: 2018-06-01 | Stop reason: SDUPTHER

## 2018-06-01 RX ORDER — ALBUTEROL SULFATE 90 UG/1
2 AEROSOL, METERED RESPIRATORY (INHALATION) EVERY 6 HOURS PRN
Qty: 1 INHALER | Refills: 3 | Status: SHIPPED | OUTPATIENT
Start: 2018-06-01 | End: 2019-05-21

## 2018-06-01 RX ORDER — SULFAMETHOXAZOLE AND TRIMETHOPRIM 800; 160 MG/1; MG/1
1 TABLET ORAL 2 TIMES DAILY
Qty: 20 TABLET | Refills: 0 | Status: SHIPPED | OUTPATIENT
Start: 2018-06-01 | End: 2018-06-11 | Stop reason: ALTCHOICE

## 2018-06-01 ASSESSMENT — ENCOUNTER SYMPTOMS
SHORTNESS OF BREATH: 1
COUGH: 1
VOMITING: 0
NAUSEA: 0
WHEEZING: 1
RHINORRHEA: 1
TROUBLE SWALLOWING: 0

## 2018-06-06 ENCOUNTER — HOSPITAL ENCOUNTER (OUTPATIENT)
Dept: CT IMAGING | Age: 58
Discharge: HOME | End: 2018-06-06
Attending: INTERNAL MEDICINE | Admitting: INTERNAL MEDICINE

## 2018-06-06 DIAGNOSIS — R93.89 ABNORMAL CXR: ICD-10-CM

## 2018-06-06 DIAGNOSIS — R93.89 ABNORMAL FINDINGS ON DIAGNOSTIC IMAGING OF OTHER SPECIFIED BODY STRUCTURES: ICD-10-CM

## 2018-06-06 DIAGNOSIS — R06.2 WHEEZING: ICD-10-CM

## 2018-06-07 ENCOUNTER — TELEPHONE (OUTPATIENT)
Dept: INTERNAL MEDICINE CLINIC | Age: 58
End: 2018-06-07

## 2018-06-11 ENCOUNTER — OFFICE VISIT (OUTPATIENT)
Dept: INTERNAL MEDICINE CLINIC | Age: 58
End: 2018-06-11

## 2018-06-11 VITALS
SYSTOLIC BLOOD PRESSURE: 122 MMHG | BODY MASS INDEX: 31.42 KG/M2 | WEIGHT: 232 LBS | HEART RATE: 64 BPM | DIASTOLIC BLOOD PRESSURE: 82 MMHG | HEIGHT: 72 IN

## 2018-06-11 DIAGNOSIS — J18.9 COMMUNITY ACQUIRED PNEUMONIA OF LEFT UPPER LOBE OF LUNG: Primary | ICD-10-CM

## 2018-06-11 DIAGNOSIS — I10 ESSENTIAL HYPERTENSION: Chronic | ICD-10-CM

## 2018-06-11 DIAGNOSIS — G20 PARKINSONIAN TREMOR (HCC): ICD-10-CM

## 2018-06-11 DIAGNOSIS — I48.0 PAROXYSMAL ATRIAL FIBRILLATION (HCC): ICD-10-CM

## 2018-06-11 PROBLEM — G25.0 BENIGN ESSENTIAL TREMOR: Status: RESOLVED | Noted: 2018-01-29 | Resolved: 2018-06-11

## 2018-06-11 PROCEDURE — 1036F TOBACCO NON-USER: CPT | Performed by: INTERNAL MEDICINE

## 2018-06-11 PROCEDURE — G8427 DOCREV CUR MEDS BY ELIG CLIN: HCPCS | Performed by: INTERNAL MEDICINE

## 2018-06-11 PROCEDURE — 99213 OFFICE O/P EST LOW 20 MIN: CPT | Performed by: INTERNAL MEDICINE

## 2018-06-11 PROCEDURE — G8417 CALC BMI ABV UP PARAM F/U: HCPCS | Performed by: INTERNAL MEDICINE

## 2018-06-11 PROCEDURE — 3017F COLORECTAL CA SCREEN DOC REV: CPT | Performed by: INTERNAL MEDICINE

## 2018-07-12 DIAGNOSIS — I48.91 UNSPECIFIED ATRIAL FIBRILLATION (HCC): ICD-10-CM

## 2018-07-12 RX ORDER — DILTIAZEM HYDROCHLORIDE 120 MG/1
120 CAPSULE, COATED, EXTENDED RELEASE ORAL DAILY
Qty: 90 CAPSULE | Refills: 1 | Status: SHIPPED | OUTPATIENT
Start: 2018-07-12 | End: 2018-09-11 | Stop reason: SDUPTHER

## 2018-07-17 ENCOUNTER — OFFICE VISIT (OUTPATIENT)
Dept: CARDIOLOGY CLINIC | Age: 58
End: 2018-07-17

## 2018-07-17 VITALS
BODY MASS INDEX: 31.42 KG/M2 | DIASTOLIC BLOOD PRESSURE: 84 MMHG | OXYGEN SATURATION: 98 % | HEART RATE: 57 BPM | WEIGHT: 232 LBS | SYSTOLIC BLOOD PRESSURE: 110 MMHG | HEIGHT: 72 IN

## 2018-07-17 DIAGNOSIS — E66.9 OBESITY, UNSPECIFIED CLASSIFICATION, UNSPECIFIED OBESITY TYPE, UNSPECIFIED WHETHER SERIOUS COMORBIDITY PRESENT: ICD-10-CM

## 2018-07-17 DIAGNOSIS — G47.33 OBSTRUCTIVE SLEEP APNEA SYNDROME: Chronic | ICD-10-CM

## 2018-07-17 DIAGNOSIS — I48.0 PAROXYSMAL ATRIAL FIBRILLATION (HCC): ICD-10-CM

## 2018-07-17 DIAGNOSIS — I48.92 ATRIAL FLUTTER, UNSPECIFIED TYPE (HCC): ICD-10-CM

## 2018-07-17 DIAGNOSIS — I10 ESSENTIAL HYPERTENSION: Chronic | ICD-10-CM

## 2018-07-17 DIAGNOSIS — R25.1 TREMOR: Primary | ICD-10-CM

## 2018-07-17 PROCEDURE — 99214 OFFICE O/P EST MOD 30 MIN: CPT | Performed by: INTERNAL MEDICINE

## 2018-07-17 PROCEDURE — 1036F TOBACCO NON-USER: CPT | Performed by: INTERNAL MEDICINE

## 2018-07-17 PROCEDURE — 3017F COLORECTAL CA SCREEN DOC REV: CPT | Performed by: INTERNAL MEDICINE

## 2018-07-17 PROCEDURE — 93000 ELECTROCARDIOGRAM COMPLETE: CPT | Performed by: INTERNAL MEDICINE

## 2018-07-17 PROCEDURE — G8417 CALC BMI ABV UP PARAM F/U: HCPCS | Performed by: INTERNAL MEDICINE

## 2018-07-17 PROCEDURE — G8427 DOCREV CUR MEDS BY ELIG CLIN: HCPCS | Performed by: INTERNAL MEDICINE

## 2018-07-17 NOTE — PROGRESS NOTES
[address]  Electrophysiology Followup    Referring Provider:  Evelyn Saba MD     CC: Atrial fibrillation  History of Present Illness:  Cindy Monaco is 61 y.o. male who here for follow up. History of symptomatic persistent atrial fibrillation. He also has history of prior atrial fibrillation/flutter ablation in late 90's at  by Dr. Rad Chappell. On 2/25/16, he had PVI and CAFE ablation with restoration of sinus rhythm with ablation. He returned with recurrent atrial fibrillation after being off Propafenone and was cardioverted on 4/6/16. States he did not feel the AF like he did prior to ablation. Interval history:  Since last OV he reports he has been diagnosed with parkinson's disease. He has not had recurrence of atrial fib since cardioversion in 2016. At last visit he was having new tremor and was referred to neurology. MRI of the brain showed no acute intracranial abnormalities. He is doing well from the cardiac standpoint. He denies heart skipping or racing. He denies exertional chest pain or shortness of breath. Xarelto changed to prn at last visit. He would like to stop propafenone. He denies unusual bleeding or bruising. His tremors are improved on levodopa. He is currently following with  at [facility] for management. He has mild sleep apnea and couldn't tolerate CPAP mask. Patient denies lightheadedness, dizziness, chest pain, palpitations, orthopnea, edema, presyncope or syncope. Past Medical History:   has a past medical history of Anxiety; Atrial fibrillation ([address] Atrial flutter ([address] Cervical arthritis (HCC); CTS (carpal tunnel syndrome); Essential tremor; Hypertension; KATELIN (obstructive sleep apnea); Osteoarthritis of back; Osteoarthritis of right hip; S/P ablation of atrial fibrillation; Sinusitis; and Trigger finger. Surgical History:   has a past surgical history that includes Atrial ablation surgery (1998); Colonoscopy (10/29/2007); back surgery (1984);  Toe Surgery; Salivary gland surgery; Carpal tunnel release (Right, 11/19/2013); Carpal tunnel release (Left, 12-10-13); Atrial ablation surgery (2/25/16); Cardioversion (4/6/16); Lumbar spine surgery (1984); Ankle surgery (2015); and Total hip arthroplasty (2016). Social History:   reports that he quit smoking about 22 years ago. He has a 15.00 pack-year smoking history. He has never used smokeless tobacco. He reports that he drinks about 12.0 oz of alcohol per week . He reports that he uses drugs, including Marijuana. Family History:  family history includes Alcohol Abuse in his father; High Blood Pressure in his mother; High Cholesterol in his mother; Other in an other family member. Home Medications:  Prior to Admission medications    Medication Sig Start Date End Date Taking?  Authorizing Provider   diltiazem (CARDIZEM CD) 120 MG extended release capsule TAKE 1 CAPSULE BY MOUTH DAILY 7/12/18  Yes Jean Claude Garcia MD   albuterol sulfate HFA (PROAIR HFA) 108 (90 Base) MCG/ACT inhaler Inhale 2 puffs into the lungs every 6 hours as needed for Wheezing 6/1/18  Yes Mariza Pang MD   fluticasone (FLONASE) 50 MCG/ACT nasal spray SHAKE LIQUID AND USE 1 SPRAY IN EACH NOSTRIL DAILY 6/1/18  Yes Matty Howell MD   carbidopa-levodopa (SINEMET)  MG per tablet Take 2 tablets by mouth 3 times daily  Patient taking differently: Take 3 tablets by mouth 4 times daily  2/19/18  Yes Margie Armando MD   diltiazem (CARDIZEM CD) 120 MG extended release capsule TAKE 1 CAPSULE BY MOUTH DAILY 2/5/18  Yes Jean Claude Garcia MD   primidone (MYSOLINE) 50 MG tablet Take 1 tablet by mouth nightly 10/20/17  Yes Margie Armando MD   metoprolol (LOPRESSOR) 100 MG tablet TAKE 1 TABLET TWICE A DAY 8/18/17  Yes Margie Armando MD   aspirin 325 MG EC tablet Take 1 tablet by mouth daily 7/18/17  Yes Jean Claude Garcia MD   lisinopril (PRINIVIL;ZESTRIL) 20 MG tablet TAKE 1 TABLET ONCE DAILY 7/6/17  Yes Margie Armando MD   ibuprofen (ADVIL;MOTRIN) Encounters:   07/17/18 232 lb (105.2 kg)   06/11/18 232 lb (105.2 kg)   06/01/18 235 lb (106.6 kg)     · Constitutional: Oriented. No distress. · Head: Normocephalic and atraumatic. · Mouth/Throat: Oropharynx is clear and moist.   · Eyes: Conjunctivae normal. EOM are normal.   · Neck: Neck supple. No rigidity. No JVD present. · Cardiovascular: regular rhythm, S1&S2. · Pulmonary/Chest: Bilateral respiratory sounds. No wheezes, No rhonchi. · Abdominal: Soft. Bowel sounds present. No distension, No tenderness. + obese   · Musculoskeletal: No tenderness. No edema    · Lymphadenopathy: Has no cervical adenopathy. · Neurological: Alert and oriented. Cranial nerve appears intact, No Gross deficit   · Skin: Skin is warm and dry. No rash noted. · Psychiatric: Has a normal behavior     Patient Active Problem List   Diagnosis    Essential hypertension    Obstructive sleep apnea syndrome    Paroxysmal atrial fibrillation (HCC)    Macrocytosis without anemia    Disabling essential tremor    Parkinsonian tremor (HCC)     -Tremor   He has been diagnosed with Parkinson disease now   Followed by neurology, trail on levodopa with good response   Propafenone rarely can cause tremor. He states that often is expensive and would like to stop it. Discontinue propafenone     - Atrial fibrillation/ flutter    - S/p PVI and CFAE ablation with restoration of sinus rhythm on 2/25/16   - no recurrence since DCCV 4/6/16   - Discontinue propafenone    If recurrent atrial fibrillation, flutter, can be used. Ablation also can be considered if recurrent atrial fibrillation  - Cardizem, metoprolol  - Xarelto stopped at last visit  -Continue  mg daily. - Can take Xarelto prn if A fib recurs as patient believes he will recognize A fib again. No recurrence. - Hypertension:    Stable. Continue with current medications.     /84 (Site: Left Arm, Position: Sitting, Cuff Size: Large Adult)   Pulse 57   Ht

## 2018-08-13 ENCOUNTER — TELEPHONE (OUTPATIENT)
Dept: INTERNAL MEDICINE CLINIC | Age: 58
End: 2018-08-13

## 2018-08-13 NOTE — TELEPHONE ENCOUNTER
Pt called, states he saw Dr. Rika Goodwin once as a NP. This visit was on 12/11/17. It appears Dr. Rika Goodwin transferred care back to Dr. Blanca Tate. Pt states since that visit he has been diagnosed with some new things and is wanting to talk to someone about this. Dr. Blanca Tate is this something you need to speak with Dr. Rika Goodwin first about?

## 2018-08-23 ENCOUNTER — OFFICE VISIT (OUTPATIENT)
Dept: PSYCHOLOGY | Age: 58
End: 2018-08-23

## 2018-08-23 DIAGNOSIS — F32.1 CURRENT MODERATE EPISODE OF MAJOR DEPRESSIVE DISORDER WITHOUT PRIOR EPISODE (HCC): Primary | ICD-10-CM

## 2018-08-23 DIAGNOSIS — F43.22 ADJUSTMENT DISORDER WITH ANXIOUS MOOD: ICD-10-CM

## 2018-08-23 PROCEDURE — 90791 PSYCH DIAGNOSTIC EVALUATION: CPT | Performed by: PSYCHOLOGIST

## 2018-08-23 ASSESSMENT — PATIENT HEALTH QUESTIONNAIRE - PHQ9
SUM OF ALL RESPONSES TO PHQ9 QUESTIONS 1 & 2: 4
4. FEELING TIRED OR HAVING LITTLE ENERGY: 2
6. FEELING BAD ABOUT YOURSELF - OR THAT YOU ARE A FAILURE OR HAVE LET YOURSELF OR YOUR FAMILY DOWN: 2
2. FEELING DOWN, DEPRESSED OR HOPELESS: 2
5. POOR APPETITE OR OVEREATING: 2
1. LITTLE INTEREST OR PLEASURE IN DOING THINGS: 2
9. THOUGHTS THAT YOU WOULD BE BETTER OFF DEAD, OR OF HURTING YOURSELF: 1
7. TROUBLE CONCENTRATING ON THINGS, SUCH AS READING THE NEWSPAPER OR WATCHING TELEVISION: 2
SUM OF ALL RESPONSES TO PHQ QUESTIONS 1-9: 17
10. IF YOU CHECKED OFF ANY PROBLEMS, HOW DIFFICULT HAVE THESE PROBLEMS MADE IT FOR YOU TO DO YOUR WORK, TAKE CARE OF THINGS AT HOME, OR GET ALONG WITH OTHER PEOPLE: 1
SUM OF ALL RESPONSES TO PHQ QUESTIONS 1-9: 17
8. MOVING OR SPEAKING SO SLOWLY THAT OTHER PEOPLE COULD HAVE NOTICED. OR THE OPPOSITE, BEING SO FIGETY OR RESTLESS THAT YOU HAVE BEEN MOVING AROUND A LOT MORE THAN USUAL: 1
3. TROUBLE FALLING OR STAYING ASLEEP: 3

## 2018-08-23 NOTE — PROGRESS NOTES
excessive preoccupations, cognitive distortions and all or nothing thinking  Thought Process    goal directed and coherent  Associations    logical connections  Insight    Fair  Judgment    Intact  Orientation    oriented to person, place, time, and general circumstances  Memory    recent and remote memory intact  Attention/Concentration    impaired  Morbid ideation Yes, Patient noted vague and passive thoughts of death, but adamantly denied suicidal intent or plan. 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 / [employer]      Social History Main Topics    Smoking status: Former Smoker     Packs/day: 1.00     Years: 15.00     Quit date: 5/17/1996    Smokeless tobacco: Never Used    Alcohol use 12.0 oz/week     20 Standard drinks or equivalent per week      Comment: weekends     Drug use: Yes     Types: Marijuana      Comment: occ    Sexual activity: Not on file     Other Topics Concern    Not on file     Social History Narrative    Grew up in [address] [facility]    No hx of abuse     in 2001 after 6 yrs of marriage    Has 1 daughter    Has 2 brothers, he is the middle child     TOBACCO:   reports that he quit smoking about 22 years ago. He has a 15.00 pack-year smoking history. He has never used smokeless tobacco.  ETOH:   reports that he drinks about 12.0 oz of alcohol per week . A:  Administered PHQ-9 (see below). Patient endorses moderately severe symptoms of depression. Denied SI/HI.    PHQ Scores 8/23/2018 8/18/2017 7/8/2015   PHQ2 Score 4 0 0   PHQ9 Score 17 0 0     Interpretation of Total Score Depression Severity: 1-4 = Minimal depression, 5-9 = Mild depression, 10-14 = Moderate depression, 15-19 = Moderately severe depression, 20-27 = Severe depression      Diagnosis:    Adjustment disorder with anxiety  Major depressive disorder; single episode and moderate    Plan:  Pt interventions:  Provided handout on  insomnia, Discussed and problem-solved barriers in adhering to behavioral change plan, Motivational Interviewing to increase patient confidence and compliance with adhering to behavioral change plan, Motivational Interviewing to determine importance and readiness for change, Established rapport, Conducted functional assessment, [place]-setting to identify pt's primary goals for [facility]AYLEEN MALLOY [facility] visit / overall health, Supportive techniques and Provided Psychoeducation re: insomnia    Documentation was done using voice recognition dragon software. Every effort was made to ensure accuracy; however, inadvertent, unintentional computerized transcription errors may be present.

## 2018-08-23 NOTE — Clinical Note
He is interested in medication for mood/anxiety. Because of his complex Parkinson's medications, I added Dr. Juwan Yang for consultation. Let me know if you have any questions. Thanks!

## 2018-08-23 NOTE — PATIENT INSTRUCTIONS
interfere with sleep due to body temperature changes. Bedroom Environment   Your bedroom should have a moderate temperature and be quiet and dark. Noises can be masked with background white noise (e.g., the noise of a fan) or with earplugs. Bedrooms may be darkened with blackout shades, or sleep masks can be worn. Eating   A light bedtime snack, such a glass of warm milk, cheese, or a bowl of cereal can promote sleep. Avoid snacks in the middle of the night because awakening may become associated with hunger. Avoid Naps   The sleep you obtain during the day takes away from the amount of sleep you need that night. Naps can, however, be helpful (for your mood) under certain conditions. Limit the nap to 45 minutes and don't nap after 4:00 p.m. Allow Yourself at Least an Hour Before Bedtime to Unwind   Find what works for you to wind down. Engage in calming, relaxing activities. Don't engage in an activity that will promote drowsiness before it's time for bed. Regular Sleep Schedule   Keep a regular time each day, 7 days a week, to get out of bed. Keeping a regular waking time helps set your circadian rhythm so that your body learns to sleep at the desired time. Set a Reasonable Bedtime and Arising Time and Stick to Them   Set the alarm clock and get out of bed at the same time each morning, weekdays and weekends, regardless of your bedtime or the amount of sleep you obtained on the previous night.
Alcides LamasBarrow Neurological Institute [phone]

## 2018-09-05 PROBLEM — F32.1 CURRENT MODERATE EPISODE OF MAJOR DEPRESSIVE DISORDER WITHOUT PRIOR EPISODE (HCC): Status: ACTIVE | Noted: 2018-09-05

## 2018-09-05 PROBLEM — F43.22 ADJUSTMENT DISORDER WITH ANXIOUS MOOD: Status: ACTIVE | Noted: 2018-09-05

## 2018-09-11 ENCOUNTER — OFFICE VISIT (OUTPATIENT)
Dept: INTERNAL MEDICINE CLINIC | Age: 58
End: 2018-09-11

## 2018-09-11 VITALS
SYSTOLIC BLOOD PRESSURE: 124 MMHG | DIASTOLIC BLOOD PRESSURE: 74 MMHG | HEIGHT: 76 IN | HEART RATE: 60 BPM | BODY MASS INDEX: 27.89 KG/M2 | WEIGHT: 229 LBS

## 2018-09-11 DIAGNOSIS — I10 ESSENTIAL HYPERTENSION: Chronic | ICD-10-CM

## 2018-09-11 DIAGNOSIS — F43.22 ADJUSTMENT DISORDER WITH ANXIOUS MOOD: ICD-10-CM

## 2018-09-11 DIAGNOSIS — M47.812 CERVICAL ARTHRITIS: Chronic | ICD-10-CM

## 2018-09-11 DIAGNOSIS — F33.1 MODERATE EPISODE OF RECURRENT MAJOR DEPRESSIVE DISORDER (HCC): Chronic | ICD-10-CM

## 2018-09-11 DIAGNOSIS — G20 PARKINSONIAN TREMOR (HCC): ICD-10-CM

## 2018-09-11 DIAGNOSIS — I10 ESSENTIAL HYPERTENSION: Primary | Chronic | ICD-10-CM

## 2018-09-11 LAB
A/G RATIO: 1.9 (ref 1.1–2.2)
ALBUMIN SERPL-MCNC: 4.6 G/DL (ref 3.4–5)
ALP BLD-CCNC: 107 U/L (ref 40–129)
ALT SERPL-CCNC: <5 U/L (ref 10–40)
ANION GAP SERPL CALCULATED.3IONS-SCNC: 15 MMOL/L (ref 3–16)
AST SERPL-CCNC: 12 U/L (ref 15–37)
BASOPHILS ABSOLUTE: 0 K/UL (ref 0–0.2)
BASOPHILS RELATIVE PERCENT: 0.3 %
BILIRUB SERPL-MCNC: 0.6 MG/DL (ref 0–1)
BUN BLDV-MCNC: 11 MG/DL (ref 7–20)
CALCIUM SERPL-MCNC: 10.1 MG/DL (ref 8.3–10.6)
CHLORIDE BLD-SCNC: 98 MMOL/L (ref 99–110)
CHOLESTEROL, TOTAL: 167 MG/DL (ref 0–199)
CO2: 27 MMOL/L (ref 21–32)
CREAT SERPL-MCNC: 0.7 MG/DL (ref 0.9–1.3)
EOSINOPHILS ABSOLUTE: 0.2 K/UL (ref 0–0.6)
EOSINOPHILS RELATIVE PERCENT: 3.8 %
GFR AFRICAN AMERICAN: >60
GFR NON-AFRICAN AMERICAN: >60
GLOBULIN: 2.4 G/DL
GLUCOSE BLD-MCNC: 90 MG/DL (ref 70–99)
HCT VFR BLD CALC: 48.5 % (ref 40.5–52.5)
HDLC SERPL-MCNC: 42 MG/DL (ref 40–60)
HEMOGLOBIN: 16.4 G/DL (ref 13.5–17.5)
LDL CHOLESTEROL CALCULATED: 93 MG/DL
LYMPHOCYTES ABSOLUTE: 1.1 K/UL (ref 1–5.1)
LYMPHOCYTES RELATIVE PERCENT: 18.8 %
MCH RBC QN AUTO: 33.3 PG (ref 26–34)
MCHC RBC AUTO-ENTMCNC: 33.8 G/DL (ref 31–36)
MCV RBC AUTO: 98.6 FL (ref 80–100)
MONOCYTES ABSOLUTE: 0.6 K/UL (ref 0–1.3)
MONOCYTES RELATIVE PERCENT: 9.3 %
NEUTROPHILS ABSOLUTE: 4 K/UL (ref 1.7–7.7)
NEUTROPHILS RELATIVE PERCENT: 67.8 %
PDW BLD-RTO: 14.2 % (ref 12.4–15.4)
PLATELET # BLD: 158 K/UL (ref 135–450)
PMV BLD AUTO: 10.7 FL (ref 5–10.5)
POTASSIUM SERPL-SCNC: 4.6 MMOL/L (ref 3.5–5.1)
RBC # BLD: 4.92 M/UL (ref 4.2–5.9)
SODIUM BLD-SCNC: 140 MMOL/L (ref 136–145)
T4 FREE: 1.3 NG/DL (ref 0.9–1.8)
TOTAL PROTEIN: 7 G/DL (ref 6.4–8.2)
TRIGL SERPL-MCNC: 158 MG/DL (ref 0–150)
TSH SERPL DL<=0.05 MIU/L-ACNC: 1.5 UIU/ML (ref 0.27–4.2)
VLDLC SERPL CALC-MCNC: 32 MG/DL
WBC # BLD: 6 K/UL (ref 4–11)

## 2018-09-11 PROCEDURE — 99214 OFFICE O/P EST MOD 30 MIN: CPT | Performed by: INTERNAL MEDICINE

## 2018-09-11 PROCEDURE — 3017F COLORECTAL CA SCREEN DOC REV: CPT | Performed by: INTERNAL MEDICINE

## 2018-09-11 PROCEDURE — 1036F TOBACCO NON-USER: CPT | Performed by: INTERNAL MEDICINE

## 2018-09-11 PROCEDURE — G8417 CALC BMI ABV UP PARAM F/U: HCPCS | Performed by: INTERNAL MEDICINE

## 2018-09-11 PROCEDURE — G8427 DOCREV CUR MEDS BY ELIG CLIN: HCPCS | Performed by: INTERNAL MEDICINE

## 2018-09-11 RX ORDER — ESCITALOPRAM OXALATE 10 MG/1
10 TABLET ORAL DAILY
Qty: 30 TABLET | Refills: 5 | Status: SHIPPED | OUTPATIENT
Start: 2018-09-11 | End: 2019-09-06

## 2018-09-11 RX ORDER — IBUPROFEN 800 MG/1
800 TABLET ORAL EVERY 8 HOURS PRN
Qty: 270 TABLET | Refills: 0 | Status: SHIPPED | OUTPATIENT
Start: 2018-09-11 | End: 2019-07-26 | Stop reason: SDUPTHER

## 2018-09-11 NOTE — PROGRESS NOTES
mouth. 1/2 tab po bid, Disp: , Rfl:     Assessment/Plan:  Miquel Costello was seen today for follow-up. Diagnoses and all orders for this visit:    Essential hypertension  -     CBC Auto Differential; Future  -     Comprehensive Metabolic Panel; Future  -     Lipid Panel; Future  -     T4, Free; Future  -     TSH without Reflex; Future    Cervical arthritis (HCC)  -     ibuprofen (ADVIL;MOTRIN) 800 MG tablet; Take 1 tablet by mouth every 8 hours as needed for Pain    Adjustment disorder with anxious mood  Comments:  He is seeing a behavioral health specialist.     Parkinsonian tremor ([address]  Comments:  Chronic, scheduled. Moderate episode of recurrent major depressive disorder (HCC)  -     escitalopram (LEXAPRO) 10 MG tablet;  Take 1 tablet by mouth daily      Jorge Butterfield MD

## 2018-09-12 ENCOUNTER — TELEPHONE (OUTPATIENT)
Dept: INTERNAL MEDICINE CLINIC | Age: 58
End: 2018-09-12

## 2018-09-13 RX ORDER — AMITRIPTYLINE HYDROCHLORIDE 25 MG/1
TABLET, FILM COATED ORAL
Qty: 90 TABLET | Refills: 1 | Status: SHIPPED | OUTPATIENT
Start: 2018-09-13 | End: 2019-09-06

## 2018-09-15 DIAGNOSIS — I10 ESSENTIAL HYPERTENSION: Chronic | ICD-10-CM

## 2018-09-17 RX ORDER — METOPROLOL TARTRATE 100 MG/1
TABLET ORAL
Qty: 180 TABLET | Refills: 3 | Status: SHIPPED | OUTPATIENT
Start: 2018-09-17 | End: 2019-03-01

## 2018-09-26 ENCOUNTER — OFFICE VISIT (OUTPATIENT)
Dept: PSYCHOLOGY | Age: 58
End: 2018-09-26
Payer: MEDICARE

## 2018-09-26 DIAGNOSIS — F43.22 ADJUSTMENT DISORDER WITH ANXIOUS MOOD: ICD-10-CM

## 2018-09-26 DIAGNOSIS — F33.1 MODERATE EPISODE OF RECURRENT MAJOR DEPRESSIVE DISORDER (HCC): Primary | ICD-10-CM

## 2018-09-26 DIAGNOSIS — F51.01 PRIMARY INSOMNIA: Primary | ICD-10-CM

## 2018-09-26 PROCEDURE — 90832 PSYTX W PT 30 MINUTES: CPT | Performed by: PSYCHOLOGIST

## 2018-09-26 ASSESSMENT — PATIENT HEALTH QUESTIONNAIRE - PHQ9
10. IF YOU CHECKED OFF ANY PROBLEMS, HOW DIFFICULT HAVE THESE PROBLEMS MADE IT FOR YOU TO DO YOUR WORK, TAKE CARE OF THINGS AT HOME, OR GET ALONG WITH OTHER PEOPLE: 2
SUM OF ALL RESPONSES TO PHQ QUESTIONS 1-9: 13
1. LITTLE INTEREST OR PLEASURE IN DOING THINGS: 2
8. MOVING OR SPEAKING SO SLOWLY THAT OTHER PEOPLE COULD HAVE NOTICED. OR THE OPPOSITE, BEING SO FIGETY OR RESTLESS THAT YOU HAVE BEEN MOVING AROUND A LOT MORE THAN USUAL: 1
3. TROUBLE FALLING OR STAYING ASLEEP: 3
5. POOR APPETITE OR OVEREATING: 2
SUM OF ALL RESPONSES TO PHQ9 QUESTIONS 1 & 2: 3
2. FEELING DOWN, DEPRESSED OR HOPELESS: 1
SUM OF ALL RESPONSES TO PHQ QUESTIONS 1-9: 13
7. TROUBLE CONCENTRATING ON THINGS, SUCH AS READING THE NEWSPAPER OR WATCHING TELEVISION: 1
6. FEELING BAD ABOUT YOURSELF - OR THAT YOU ARE A FAILURE OR HAVE LET YOURSELF OR YOUR FAMILY DOWN: 1
9. THOUGHTS THAT YOU WOULD BE BETTER OFF DEAD, OR OF HURTING YOURSELF: 0
4. FEELING TIRED OR HAVING LITTLE ENERGY: 2

## 2018-09-26 NOTE — PROGRESS NOTES
articulated and high productivity  Mood    Angry  Affect    agitation  Thought Content    excessive preoccupations and all or nothing thinking  Thought Process    goal directed, coherent and tangential  Associations    logical connections, tangential connections  Insight    Fair  Judgment    Fair  Orientation    oriented to person, place, time, and general circumstances  Memory    recent and remote memory intact  Attention/Concentration    impaired  Morbid ideation No  Suicide Assessment    no suicidal ideation    History:  Social History:   Social History     Social History    Marital status:      Spouse name: N/A    Number of children: 1    Years of education: N/A     Occupational History    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 / [employer]      Social History Main Topics    Smoking status: Former Smoker     Packs/day: 1.00     Years: 15.00     Quit date: 5/17/1996    Smokeless tobacco: Never Used    Alcohol use 12.0 oz/week     20 Standard drinks or equivalent per week      Comment: weekends     Drug use: Yes     Types: Marijuana      Comment: occ    Sexual activity: Not on file     Other Topics Concern    Not on file     Social History Narrative    Grew up in [facility]    No hx of abuse     in 2001 after 11 yrs of marriage    Has 1 daughter    Has 2 brothers, he is the middle child     TOBACCO:   reports that he quit smoking about 22 years ago. He has a 15.00 pack-year smoking history. He has never used smokeless tobacco.  ETOH:   reports that he drinks about 12.0 oz of alcohol per week . A:  Administered PHQ-9 (see below). Patient endorses moderate symptoms of depression. Denied SI/HI.      PHQ Scores 9/26/2018 8/23/2018 8/18/2017 7/8/2015   PHQ2 Score 3 4 0 0   PHQ9 Score 13 17 0 0     Interpretation of Total Score Depression Severity: 1-4 = Minimal depression, 5-9 = Mild depression, 10-14 = Moderate depression, 15-19 = Moderately severe depression, 20-27 = Severe

## 2018-10-24 ENCOUNTER — OFFICE VISIT (OUTPATIENT)
Dept: PSYCHOLOGY | Age: 58
End: 2018-10-24
Payer: MEDICARE

## 2018-10-24 DIAGNOSIS — F33.1 MODERATE EPISODE OF RECURRENT MAJOR DEPRESSIVE DISORDER (HCC): Primary | ICD-10-CM

## 2018-10-24 PROCEDURE — 90832 PSYTX W PT 30 MINUTES: CPT | Performed by: PSYCHOLOGIST

## 2018-10-24 ASSESSMENT — PATIENT HEALTH QUESTIONNAIRE - PHQ9
5. POOR APPETITE OR OVEREATING: 2
SUM OF ALL RESPONSES TO PHQ QUESTIONS 1-9: 10
SUM OF ALL RESPONSES TO PHQ9 QUESTIONS 1 & 2: 2
1. LITTLE INTEREST OR PLEASURE IN DOING THINGS: 1
7. TROUBLE CONCENTRATING ON THINGS, SUCH AS READING THE NEWSPAPER OR WATCHING TELEVISION: 1
6. FEELING BAD ABOUT YOURSELF - OR THAT YOU ARE A FAILURE OR HAVE LET YOURSELF OR YOUR FAMILY DOWN: 1
4. FEELING TIRED OR HAVING LITTLE ENERGY: 2
3. TROUBLE FALLING OR STAYING ASLEEP: 1
8. MOVING OR SPEAKING SO SLOWLY THAT OTHER PEOPLE COULD HAVE NOTICED. OR THE OPPOSITE, BEING SO FIGETY OR RESTLESS THAT YOU HAVE BEEN MOVING AROUND A LOT MORE THAN USUAL: 1
SUM OF ALL RESPONSES TO PHQ QUESTIONS 1-9: 10
10. IF YOU CHECKED OFF ANY PROBLEMS, HOW DIFFICULT HAVE THESE PROBLEMS MADE IT FOR YOU TO DO YOUR WORK, TAKE CARE OF THINGS AT HOME, OR GET ALONG WITH OTHER PEOPLE: 1
9. THOUGHTS THAT YOU WOULD BE BETTER OFF DEAD, OR OF HURTING YOURSELF: 0
2. FEELING DOWN, DEPRESSED OR HOPELESS: 1

## 2018-10-29 ENCOUNTER — OFFICE VISIT (OUTPATIENT)
Dept: INTERNAL MEDICINE CLINIC | Age: 58
End: 2018-10-29
Payer: MEDICARE

## 2018-10-29 VITALS
DIASTOLIC BLOOD PRESSURE: 66 MMHG | OXYGEN SATURATION: 96 % | TEMPERATURE: 98.9 F | BODY MASS INDEX: 27.89 KG/M2 | HEIGHT: 76 IN | WEIGHT: 229 LBS | RESPIRATION RATE: 22 BRPM | SYSTOLIC BLOOD PRESSURE: 112 MMHG | HEART RATE: 78 BPM

## 2018-10-29 DIAGNOSIS — J40 BRONCHITIS: Primary | ICD-10-CM

## 2018-10-29 DIAGNOSIS — J01.00 ACUTE NON-RECURRENT MAXILLARY SINUSITIS: ICD-10-CM

## 2018-10-29 PROCEDURE — G8417 CALC BMI ABV UP PARAM F/U: HCPCS | Performed by: INTERNAL MEDICINE

## 2018-10-29 PROCEDURE — 99213 OFFICE O/P EST LOW 20 MIN: CPT | Performed by: INTERNAL MEDICINE

## 2018-10-29 PROCEDURE — 3017F COLORECTAL CA SCREEN DOC REV: CPT | Performed by: INTERNAL MEDICINE

## 2018-10-29 PROCEDURE — G8427 DOCREV CUR MEDS BY ELIG CLIN: HCPCS | Performed by: INTERNAL MEDICINE

## 2018-10-29 PROCEDURE — G8484 FLU IMMUNIZE NO ADMIN: HCPCS | Performed by: INTERNAL MEDICINE

## 2018-10-29 PROCEDURE — 1036F TOBACCO NON-USER: CPT | Performed by: INTERNAL MEDICINE

## 2018-10-29 RX ORDER — AZITHROMYCIN 250 MG/1
TABLET, FILM COATED ORAL
Qty: 6 TABLET | Refills: 0 | Status: SHIPPED | OUTPATIENT
Start: 2018-10-29 | End: 2019-02-05 | Stop reason: SDUPTHER

## 2018-10-29 RX ORDER — CETIRIZINE HYDROCHLORIDE, PSEUDOEPHEDRINE HYDROCHLORIDE 5; 120 MG/1; MG/1
1 TABLET, FILM COATED, EXTENDED RELEASE ORAL 2 TIMES DAILY
Qty: 30 TABLET | Refills: 0 | Status: SHIPPED | OUTPATIENT
Start: 2018-10-29 | End: 2018-11-13

## 2018-10-29 ASSESSMENT — ENCOUNTER SYMPTOMS
RHINORRHEA: 0
SHORTNESS OF BREATH: 0

## 2018-10-29 NOTE — PROGRESS NOTES
SUBJECTIVE:    Patient comes to the office complaining of cough, congestion, drainage that has been present for the last several days. His cough is productive of yellow sputum. Patient does not have associated fever or chills. Patient denies gastrointestinal symptoms related to His present condition. PHQ Scores 10/24/2018 9/26/2018 8/23/2018 8/18/2017 7/8/2015   PHQ2 Score 2 3 4 0 0   PHQ9 Score 10 13 17 0 0     Interpretation of Total Score Depression Severity: 1-4 = Minimal depression, 5-9 = Mild depression, 10-14 = Moderate depression, 15-19 = Moderately severe depression, 20-27 = Severe depression    OBJECTIVE:  Vitals:    10/29/18 1531   BP: 112/66   Pulse: 78   Resp: 22   Temp: 98.9 °F (37.2 °C)   SpO2: 96%       Review of Systems   Constitutional: Negative for fever. HENT: Negative for postnasal drip and rhinorrhea. Respiratory: Negative for shortness of breath. Physical Exam   Constitutional: He is oriented to person, place, and time. He appears well-developed and well-nourished. No distress. HENT:   Head: Normocephalic and atraumatic. Right Ear: External ear normal.   Left Ear: External ear normal.   Nose: Nose normal.   Mouth/Throat: Oropharynx is clear and moist. No oropharyngeal exudate. Eyes: Pupils are equal, round, and reactive to light. Conjunctivae and EOM are normal. Right eye exhibits no discharge. Left eye exhibits no discharge. Pulmonary/Chest: Effort normal and breath sounds normal. No respiratory distress. He has no wheezes. He has no rales. Neurological: He is alert and oriented to person, place, and time. No cranial nerve deficit. Skin: He is not diaphoretic. Psychiatric: He has a normal mood and affect. His behavior is normal. Judgment and thought content normal.   Vitals reviewed. Assessment/Plan:  Octavio Gonzlaez was seen today for cough and chest congestion.     Diagnoses and all orders for this visit:    Bronchitis  -     azithromycin (ZITHROMAX Z-SONJA) 250 MG

## 2018-12-06 ENCOUNTER — TELEPHONE (OUTPATIENT)
Dept: INTERNAL MEDICINE CLINIC | Age: 58
End: 2018-12-06

## 2019-01-09 DIAGNOSIS — I48.91 ATRIAL FIBRILLATION, UNSPECIFIED TYPE (HCC): ICD-10-CM

## 2019-01-09 RX ORDER — DILTIAZEM HYDROCHLORIDE 120 MG/1
120 CAPSULE, COATED, EXTENDED RELEASE ORAL DAILY
Qty: 90 CAPSULE | Refills: 3 | Status: SHIPPED | OUTPATIENT
Start: 2019-01-09 | End: 2020-02-05 | Stop reason: SDUPTHER

## 2019-02-05 ENCOUNTER — OFFICE VISIT (OUTPATIENT)
Dept: INTERNAL MEDICINE CLINIC | Age: 59
End: 2019-02-05
Payer: MEDICARE

## 2019-02-05 VITALS
OXYGEN SATURATION: 97 % | SYSTOLIC BLOOD PRESSURE: 128 MMHG | WEIGHT: 237 LBS | DIASTOLIC BLOOD PRESSURE: 80 MMHG | TEMPERATURE: 98 F | HEART RATE: 68 BPM | BODY MASS INDEX: 28.85 KG/M2

## 2019-02-05 DIAGNOSIS — J06.9 VIRAL UPPER RESPIRATORY TRACT INFECTION: ICD-10-CM

## 2019-02-05 DIAGNOSIS — J01.40 ACUTE PANSINUSITIS, RECURRENCE NOT SPECIFIED: Primary | ICD-10-CM

## 2019-02-05 PROCEDURE — G8427 DOCREV CUR MEDS BY ELIG CLIN: HCPCS | Performed by: NURSE PRACTITIONER

## 2019-02-05 PROCEDURE — 1036F TOBACCO NON-USER: CPT | Performed by: NURSE PRACTITIONER

## 2019-02-05 PROCEDURE — 3017F COLORECTAL CA SCREEN DOC REV: CPT | Performed by: NURSE PRACTITIONER

## 2019-02-05 PROCEDURE — G8484 FLU IMMUNIZE NO ADMIN: HCPCS | Performed by: NURSE PRACTITIONER

## 2019-02-05 PROCEDURE — G8417 CALC BMI ABV UP PARAM F/U: HCPCS | Performed by: NURSE PRACTITIONER

## 2019-02-05 PROCEDURE — 99213 OFFICE O/P EST LOW 20 MIN: CPT | Performed by: NURSE PRACTITIONER

## 2019-02-05 RX ORDER — AZITHROMYCIN 250 MG/1
250 TABLET, FILM COATED ORAL SEE ADMIN INSTRUCTIONS
Qty: 6 TABLET | Refills: 0 | Status: SHIPPED | OUTPATIENT
Start: 2019-02-05 | End: 2019-02-10

## 2019-02-05 ASSESSMENT — ENCOUNTER SYMPTOMS: COUGH: 1

## 2019-03-01 ENCOUNTER — OFFICE VISIT (OUTPATIENT)
Dept: INTERNAL MEDICINE CLINIC | Age: 59
End: 2019-03-01
Payer: MEDICARE

## 2019-03-01 VITALS
HEART RATE: 60 BPM | BODY MASS INDEX: 28.86 KG/M2 | WEIGHT: 237 LBS | SYSTOLIC BLOOD PRESSURE: 124 MMHG | HEIGHT: 76 IN | DIASTOLIC BLOOD PRESSURE: 82 MMHG

## 2019-03-01 DIAGNOSIS — G20 PARKINSONIAN TREMOR (HCC): ICD-10-CM

## 2019-03-01 DIAGNOSIS — I10 ESSENTIAL HYPERTENSION: Chronic | ICD-10-CM

## 2019-03-01 DIAGNOSIS — I48.0 PAROXYSMAL ATRIAL FIBRILLATION (HCC): ICD-10-CM

## 2019-03-01 DIAGNOSIS — G47.33 OBSTRUCTIVE SLEEP APNEA SYNDROME: Chronic | ICD-10-CM

## 2019-03-01 DIAGNOSIS — F33.1 MODERATE EPISODE OF RECURRENT MAJOR DEPRESSIVE DISORDER (HCC): Primary | Chronic | ICD-10-CM

## 2019-03-01 PROBLEM — F32.1 CURRENT MODERATE EPISODE OF MAJOR DEPRESSIVE DISORDER WITHOUT PRIOR EPISODE (HCC): Status: RESOLVED | Noted: 2018-09-05 | Resolved: 2019-03-01

## 2019-03-01 PROCEDURE — 1036F TOBACCO NON-USER: CPT | Performed by: INTERNAL MEDICINE

## 2019-03-01 PROCEDURE — G8427 DOCREV CUR MEDS BY ELIG CLIN: HCPCS | Performed by: INTERNAL MEDICINE

## 2019-03-01 PROCEDURE — 3017F COLORECTAL CA SCREEN DOC REV: CPT | Performed by: INTERNAL MEDICINE

## 2019-03-01 PROCEDURE — G8484 FLU IMMUNIZE NO ADMIN: HCPCS | Performed by: INTERNAL MEDICINE

## 2019-03-01 PROCEDURE — G8417 CALC BMI ABV UP PARAM F/U: HCPCS | Performed by: INTERNAL MEDICINE

## 2019-03-01 PROCEDURE — 99214 OFFICE O/P EST MOD 30 MIN: CPT | Performed by: INTERNAL MEDICINE

## 2019-03-06 ENCOUNTER — OFFICE VISIT (OUTPATIENT)
Dept: INTERNAL MEDICINE CLINIC | Age: 59
End: 2019-03-06
Payer: MEDICARE

## 2019-03-06 VITALS
HEART RATE: 58 BPM | OXYGEN SATURATION: 97 % | DIASTOLIC BLOOD PRESSURE: 86 MMHG | TEMPERATURE: 98 F | SYSTOLIC BLOOD PRESSURE: 136 MMHG | WEIGHT: 239 LBS | BODY MASS INDEX: 29.09 KG/M2

## 2019-03-06 DIAGNOSIS — H66.012 ACUTE SUPPURATIVE OTITIS MEDIA OF LEFT EAR WITH SPONTANEOUS RUPTURE OF TYMPANIC MEMBRANE, RECURRENCE NOT SPECIFIED: Primary | ICD-10-CM

## 2019-03-06 PROCEDURE — G8427 DOCREV CUR MEDS BY ELIG CLIN: HCPCS | Performed by: NURSE PRACTITIONER

## 2019-03-06 PROCEDURE — 3017F COLORECTAL CA SCREEN DOC REV: CPT | Performed by: NURSE PRACTITIONER

## 2019-03-06 PROCEDURE — G8417 CALC BMI ABV UP PARAM F/U: HCPCS | Performed by: NURSE PRACTITIONER

## 2019-03-06 PROCEDURE — 1036F TOBACCO NON-USER: CPT | Performed by: NURSE PRACTITIONER

## 2019-03-06 PROCEDURE — G8484 FLU IMMUNIZE NO ADMIN: HCPCS | Performed by: NURSE PRACTITIONER

## 2019-03-06 PROCEDURE — 99213 OFFICE O/P EST LOW 20 MIN: CPT | Performed by: NURSE PRACTITIONER

## 2019-03-06 RX ORDER — AMOXICILLIN AND CLAVULANATE POTASSIUM 875; 125 MG/1; MG/1
1 TABLET, FILM COATED ORAL 2 TIMES DAILY
Qty: 14 TABLET | Refills: 0 | Status: SHIPPED | OUTPATIENT
Start: 2019-03-06 | End: 2022-01-31 | Stop reason: SDUPTHER

## 2019-03-06 ASSESSMENT — ENCOUNTER SYMPTOMS
GASTROINTESTINAL NEGATIVE: 1
EYES NEGATIVE: 1
COUGH: 1
ALLERGIC/IMMUNOLOGIC NEGATIVE: 1

## 2019-03-18 ENCOUNTER — OFFICE VISIT (OUTPATIENT)
Dept: INTERNAL MEDICINE CLINIC | Age: 59
End: 2019-03-18
Payer: MEDICARE

## 2019-03-18 VITALS
HEART RATE: 64 BPM | WEIGHT: 242 LBS | DIASTOLIC BLOOD PRESSURE: 84 MMHG | SYSTOLIC BLOOD PRESSURE: 134 MMHG | BODY MASS INDEX: 29.46 KG/M2

## 2019-03-18 DIAGNOSIS — H61.22 HEARING LOSS DUE TO CERUMEN IMPACTION, LEFT: ICD-10-CM

## 2019-03-18 DIAGNOSIS — H69.83 DYSFUNCTION OF BOTH EUSTACHIAN TUBES: ICD-10-CM

## 2019-03-18 DIAGNOSIS — H66.002 ACUTE SUPPURATIVE OTITIS MEDIA OF LEFT EAR WITHOUT SPONTANEOUS RUPTURE OF TYMPANIC MEMBRANE, RECURRENCE NOT SPECIFIED: Primary | ICD-10-CM

## 2019-03-18 PROCEDURE — 1036F TOBACCO NON-USER: CPT | Performed by: INTERNAL MEDICINE

## 2019-03-18 PROCEDURE — G8417 CALC BMI ABV UP PARAM F/U: HCPCS | Performed by: INTERNAL MEDICINE

## 2019-03-18 PROCEDURE — 69210 REMOVE IMPACTED EAR WAX UNI: CPT | Performed by: INTERNAL MEDICINE

## 2019-03-18 PROCEDURE — G8427 DOCREV CUR MEDS BY ELIG CLIN: HCPCS | Performed by: INTERNAL MEDICINE

## 2019-03-18 PROCEDURE — G8484 FLU IMMUNIZE NO ADMIN: HCPCS | Performed by: INTERNAL MEDICINE

## 2019-03-18 PROCEDURE — 3017F COLORECTAL CA SCREEN DOC REV: CPT | Performed by: INTERNAL MEDICINE

## 2019-03-18 PROCEDURE — 99213 OFFICE O/P EST LOW 20 MIN: CPT | Performed by: INTERNAL MEDICINE

## 2019-03-18 RX ORDER — PREDNISONE 20 MG/1
20 TABLET ORAL DAILY
Qty: 5 TABLET | Refills: 0 | Status: SHIPPED | OUTPATIENT
Start: 2019-03-18 | End: 2019-03-23

## 2019-03-27 ENCOUNTER — OFFICE VISIT (OUTPATIENT)
Dept: ENT CLINIC | Age: 59
End: 2019-03-27
Payer: MEDICARE

## 2019-03-27 VITALS
SYSTOLIC BLOOD PRESSURE: 120 MMHG | HEART RATE: 64 BPM | HEIGHT: 72 IN | WEIGHT: 235 LBS | DIASTOLIC BLOOD PRESSURE: 74 MMHG | BODY MASS INDEX: 31.83 KG/M2

## 2019-03-27 DIAGNOSIS — H61.22 IMPACTED CERUMEN OF LEFT EAR: Primary | ICD-10-CM

## 2019-03-27 PROCEDURE — 69210 REMOVE IMPACTED EAR WAX UNI: CPT | Performed by: OTOLARYNGOLOGY

## 2019-05-20 NOTE — PROGRESS NOTES
[facility]  Electrophysiology Followup    Referring Provider:  Janel Umanzor MD     CC: Atrial fibrillation  History of Present Illness:  Nancy Wilson is 60 y.o. male history of symptomatic persistent atrial fibrillation. He also has history of prior atrial fibrillation/flutter ablation in late 90's at  by Dr. Kailee Richey. 2/25/16, he had PVI and CAFE ablation with restoration of sinus rhythm with ablation. He returned with recurrent atrial fibrillation after being off Propafenone and was cardioverted on 4/6/16. No afib since then. Interval history: Today Theodore Villavicencio is feeling well from a cardiac standpoint. He denies any episodes of Afib since his ablation in 2016. He exercises daily and will try to lose weight. He says he is more active in the warmer months. Past Medical History:   has a past medical history of Anxiety, Atrial fibrillation ([address] Atrial flutter ([address] Cervical arthritis, CTS (carpal tunnel syndrome), Essential tremor, Hypertension, KATELIN (obstructive sleep apnea), Osteoarthritis of back, Osteoarthritis of right hip, S/P ablation of atrial fibrillation, Sinusitis, and Trigger finger. Surgical History:   has a past surgical history that includes Atrial ablation surgery (1998); Colonoscopy (10/29/2007); back surgery (1984); Toe Surgery; Salivary gland surgery; Carpal tunnel release (Right, 11/19/2013); Carpal tunnel release (Left, 12-10-13); Atrial ablation surgery (2/25/16); Cardioversion (4/6/16); Lumbar spine surgery (1984); Ankle surgery (2015); and Total hip arthroplasty (2016). Social History:   reports that he quit smoking about 23 years ago. He has a 15.00 pack-year smoking history. He has never used smokeless tobacco. He reports that he drinks about 12.0 oz of alcohol per week. He reports that he has current or past drug history. Drug: Marijuana.      Family History:  family history includes Alcohol Abuse in his father; High Blood Pressure in his mother; High Cholesterol in his mother; Other in an other family member. Home Medications:  Prior to Admission medications    Medication Sig Start Date End Date Taking? Authorizing Provider   diltiazem (CARDIZEM CD) 120 MG extended release capsule Take 1 capsule by mouth daily 1/9/19  Yes EMILIA Jerry CNP   amitriptyline (ELAVIL) 25 MG tablet TAKE 1 TABLET BY MOUTH EVERY NIGHT AT BEDTIME 9/13/18  Yes Khoa Melvin MD   ibuprofen (ADVIL;MOTRIN) 800 MG tablet Take 1 tablet by mouth every 8 hours as needed for Pain 9/11/18  Yes Khoa Melvin MD   escitalopram (LEXAPRO) 10 MG tablet Take 1 tablet by mouth daily 9/11/18  Yes Khoa Melvin MD   carbidopa-levodopa (SINEMET)  MG per tablet Take 2 tablets by mouth 3 times daily  Patient taking differently: Take 3 tablets by mouth 4 times daily  2/19/18  Yes Khoa Melvin MD   aspirin 325 MG EC tablet Take 1 tablet by mouth daily 7/18/17  Yes Danny Doran MD   Fexofenadine HCl (ALLEGRA PO) Take  by mouth. 1/2 tab po bid   Yes Historical Provider, MD      Allergies:  Patient has no known allergies. DATA:    Lab Results   Component Value Date    TSH 1.50 09/11/2018    TSH 2.01 07/08/2015    CREATININE 0.7 09/11/2018    CREATININE 0.8 02/25/2016    AST 12 09/11/2018    ALT <5 09/11/2018         Diagnostic testing:  Pertinent reports were reviewed as a part of this visit. EKG 5/21/19  Sinus bradycardia  QTc 410    Echo:  2/25/16  Normal left ventricle size, wall thickness and systolic function with an   estimated ejection fraction of 55%.   Mild mitral regurgitation is present.  Gradie Bran is no evidence of mass or thrombus in the left atrium or appendage.   The aortic root is mildly dilated. 3/2013  Preserved LV systolic function with EF of 55%  Reversal of E/A inflow velocities across the mitral valve suggesting impaired left ventricular relaxation. Mild mitral regurgitation is present. Mildly dilated left atrium.     Review of System:  All other with anxious mood    Moderate episode of recurrent major depressive disorder [address] [address]     - Atrial fibrillation/ flutter               - S/p PVI and CFAE ablation with restoration of sinus rhythm on 2/25/16              - no recurrence since DCCV 4/6/16   Off antiarrhythmic therapy and doing well. Also off anticoagulation. Xarelto stopped before.    Diltiazem 120 mg daily   ASA    - Can take Xarelto prn if A fib recurs as patient believes he will recognize A fib again. No recurrence.      HTN:  Controlled. Continue current medications.      - KATELIN: intolerant of CPAP              - Refuses to wear   Lose shira      - Obesity: Body mass index is 31.46 kg/m². - Weight loss, exercise, dietary changes     Follow up in six months     - The patient is counseled to follow a low salt diet to assure blood pressure remains controlled for cardiovascular risk factor modification.   - The patient is counseled to avoid excess caffeine, and energy drinks as this may exacerbated ectopy and arrhythmia. - The patient is counseled to get regular exercise 3-5 times per week to control cardiovascular risk factors. - The patient is counseled to lose weight to control cardiovascular risk factors. NOTE: This report was transcribed using voice recognition software. Every effort was made to ensure accuracy, however, inadvertent computerized transcription errors may be present. Torres Cope MD, MPH  [facility]   Office: [phone]    Scribe attestation: This note was scribed in the presence of Torres Cope MD by Murtaza Perdue, RN  Physician Attestation: I, Dr. Torres Cope, confirm that the scribe's documentation has been prepared under my direction and personally reviewed by me in its entirety. I also confirm that the note above accurately reflects all work, treatment, procedures, and medical decision making performed by me.

## 2019-05-21 ENCOUNTER — OFFICE VISIT (OUTPATIENT)
Dept: CARDIOLOGY CLINIC | Age: 59
End: 2019-05-21
Payer: MEDICARE

## 2019-05-21 VITALS
HEIGHT: 72 IN | HEART RATE: 57 BPM | SYSTOLIC BLOOD PRESSURE: 133 MMHG | BODY MASS INDEX: 32.61 KG/M2 | WEIGHT: 240.8 LBS | DIASTOLIC BLOOD PRESSURE: 80 MMHG

## 2019-05-21 DIAGNOSIS — G47.33 OBSTRUCTIVE SLEEP APNEA SYNDROME: Chronic | ICD-10-CM

## 2019-05-21 DIAGNOSIS — I48.0 PAROXYSMAL ATRIAL FIBRILLATION (HCC): Primary | ICD-10-CM

## 2019-05-21 DIAGNOSIS — G20 PARKINSONIAN TREMOR (HCC): ICD-10-CM

## 2019-05-21 PROCEDURE — 93000 ELECTROCARDIOGRAM COMPLETE: CPT | Performed by: INTERNAL MEDICINE

## 2019-05-21 PROCEDURE — 3017F COLORECTAL CA SCREEN DOC REV: CPT | Performed by: INTERNAL MEDICINE

## 2019-05-21 PROCEDURE — G8417 CALC BMI ABV UP PARAM F/U: HCPCS | Performed by: INTERNAL MEDICINE

## 2019-05-21 PROCEDURE — 1036F TOBACCO NON-USER: CPT | Performed by: INTERNAL MEDICINE

## 2019-05-21 PROCEDURE — G8427 DOCREV CUR MEDS BY ELIG CLIN: HCPCS | Performed by: INTERNAL MEDICINE

## 2019-05-21 PROCEDURE — 99214 OFFICE O/P EST MOD 30 MIN: CPT | Performed by: INTERNAL MEDICINE

## 2019-06-12 ENCOUNTER — TELEPHONE (OUTPATIENT)
Dept: INTERNAL MEDICINE CLINIC | Age: 59
End: 2019-06-12

## 2019-06-12 NOTE — LETTER
[address]  [address]  Phone: [phone]  Fax: [phone]    Radha Warner MD        June 12, 2019     Patient: Frannie Freeamn   YOB: 1960   Date of Visit: 6/12/2019       To Whom It May Concern: It is my medical opinion that Tremaine Garcia requires a disability parking placard for the following reasons:  He has a cardiac condition to the extent that functional limitations are classified in severity as class III, according to standards accepted by the American [employer] Association. Duration of need: permanent    If you have any questions or concerns, please don't hesitate to call.     Sincerely,          Radha Warner MD

## 2019-06-12 NOTE — TELEPHONE ENCOUNTER
Patient dropped of form to request a renew for his Handicap Thony Bhandari #[id number]. Patient stated if you can not do it for any reason please notify him.   Blank forms scanned into media and placed in Faviola's basket

## 2019-06-19 ENCOUNTER — OFFICE VISIT (OUTPATIENT)
Dept: INTERNAL MEDICINE CLINIC | Age: 59
End: 2019-06-19
Payer: MEDICARE

## 2019-06-19 ENCOUNTER — HOSPITAL ENCOUNTER (OUTPATIENT)
Dept: GENERAL RADIOLOGY | Age: 59
Discharge: HOME | End: 2019-06-19
Payer: MEDICARE

## 2019-06-19 ENCOUNTER — HOSPITAL ENCOUNTER (OUTPATIENT)
Age: 59
Discharge: HOME | End: 2019-06-19
Payer: MEDICARE

## 2019-06-19 VITALS
HEART RATE: 64 BPM | WEIGHT: 240 LBS | BODY MASS INDEX: 32.51 KG/M2 | SYSTOLIC BLOOD PRESSURE: 134 MMHG | DIASTOLIC BLOOD PRESSURE: 86 MMHG | HEIGHT: 72 IN

## 2019-06-19 DIAGNOSIS — I48.0 PAROXYSMAL ATRIAL FIBRILLATION (HCC): ICD-10-CM

## 2019-06-19 DIAGNOSIS — F33.1 MODERATE EPISODE OF RECURRENT MAJOR DEPRESSIVE DISORDER (HCC): Chronic | ICD-10-CM

## 2019-06-19 DIAGNOSIS — G25.0 DISABLING ESSENTIAL TREMOR: Chronic | ICD-10-CM

## 2019-06-19 DIAGNOSIS — M47.812 CERVICAL ARTHRITIS: ICD-10-CM

## 2019-06-19 DIAGNOSIS — I10 ESSENTIAL HYPERTENSION: Primary | Chronic | ICD-10-CM

## 2019-06-19 PROCEDURE — 99214 OFFICE O/P EST MOD 30 MIN: CPT | Performed by: INTERNAL MEDICINE

## 2019-06-19 PROCEDURE — G8427 DOCREV CUR MEDS BY ELIG CLIN: HCPCS | Performed by: INTERNAL MEDICINE

## 2019-06-19 PROCEDURE — G8417 CALC BMI ABV UP PARAM F/U: HCPCS | Performed by: INTERNAL MEDICINE

## 2019-06-19 PROCEDURE — 3017F COLORECTAL CA SCREEN DOC REV: CPT | Performed by: INTERNAL MEDICINE

## 2019-06-19 PROCEDURE — 1036F TOBACCO NON-USER: CPT | Performed by: INTERNAL MEDICINE

## 2019-06-19 PROCEDURE — 72040 X-RAY EXAM NECK SPINE 2-3 VW: CPT

## 2019-07-15 ENCOUNTER — TELEPHONE (OUTPATIENT)
Dept: INTERNAL MEDICINE CLINIC | Age: 59
End: 2019-07-15

## 2019-07-15 RX ORDER — AZITHROMYCIN 250 MG/1
TABLET, FILM COATED ORAL
Qty: 1 PACKET | Refills: 0 | Status: SHIPPED | OUTPATIENT
Start: 2019-07-15 | End: 2019-09-06 | Stop reason: ALTCHOICE

## 2019-09-06 ENCOUNTER — OFFICE VISIT (OUTPATIENT)
Dept: INTERNAL MEDICINE CLINIC | Age: 59
End: 2019-09-06
Payer: MEDICARE

## 2019-09-06 VITALS
HEART RATE: 82 BPM | SYSTOLIC BLOOD PRESSURE: 124 MMHG | DIASTOLIC BLOOD PRESSURE: 80 MMHG | HEIGHT: 72 IN | WEIGHT: 233 LBS | BODY MASS INDEX: 31.56 KG/M2

## 2019-09-06 DIAGNOSIS — I48.0 PAROXYSMAL ATRIAL FIBRILLATION (HCC): ICD-10-CM

## 2019-09-06 DIAGNOSIS — M25.542 ARTHRALGIA OF BOTH HANDS: ICD-10-CM

## 2019-09-06 DIAGNOSIS — M25.541 ARTHRALGIA OF BOTH HANDS: ICD-10-CM

## 2019-09-06 DIAGNOSIS — I10 ESSENTIAL HYPERTENSION: ICD-10-CM

## 2019-09-06 DIAGNOSIS — G20 PARKINSONIAN TREMOR (HCC): ICD-10-CM

## 2019-09-06 DIAGNOSIS — F43.22 ADJUSTMENT DISORDER WITH ANXIOUS MOOD: ICD-10-CM

## 2019-09-06 DIAGNOSIS — I10 ESSENTIAL HYPERTENSION: Primary | ICD-10-CM

## 2019-09-06 LAB
A/G RATIO: 2.2 (ref 1.1–2.2)
ALBUMIN SERPL-MCNC: 5 G/DL (ref 3.4–5)
ALP BLD-CCNC: 110 U/L (ref 40–129)
ALT SERPL-CCNC: <5 U/L (ref 10–40)
ANION GAP SERPL CALCULATED.3IONS-SCNC: 17 MMOL/L (ref 3–16)
AST SERPL-CCNC: 18 U/L (ref 15–37)
BASOPHILS ABSOLUTE: 0 K/UL (ref 0–0.2)
BASOPHILS RELATIVE PERCENT: 0.4 %
BILIRUB SERPL-MCNC: 0.7 MG/DL (ref 0–1)
BUN BLDV-MCNC: 11 MG/DL (ref 7–20)
CALCIUM SERPL-MCNC: 10.1 MG/DL (ref 8.3–10.6)
CHLORIDE BLD-SCNC: 98 MMOL/L (ref 99–110)
CHOLESTEROL, TOTAL: 191 MG/DL (ref 0–199)
CO2: 26 MMOL/L (ref 21–32)
CREAT SERPL-MCNC: 0.8 MG/DL (ref 0.8–1.3)
EOSINOPHILS ABSOLUTE: 0.2 K/UL (ref 0–0.6)
EOSINOPHILS RELATIVE PERCENT: 3.7 %
GFR AFRICAN AMERICAN: >60
GFR NON-AFRICAN AMERICAN: >60
GLOBULIN: 2.3 G/DL
GLUCOSE BLD-MCNC: 92 MG/DL (ref 70–99)
HCT VFR BLD CALC: 50.5 % (ref 40.5–52.5)
HDLC SERPL-MCNC: 39 MG/DL (ref 40–60)
HEMOGLOBIN: 16.4 G/DL (ref 13.5–17.5)
LDL CHOLESTEROL CALCULATED: 112 MG/DL
LYMPHOCYTES ABSOLUTE: 1.4 K/UL (ref 1–5.1)
LYMPHOCYTES RELATIVE PERCENT: 25.8 %
MCH RBC QN AUTO: 33.3 PG (ref 26–34)
MCHC RBC AUTO-ENTMCNC: 32.5 G/DL (ref 31–36)
MCV RBC AUTO: 102.4 FL (ref 80–100)
MONOCYTES ABSOLUTE: 0.5 K/UL (ref 0–1.3)
MONOCYTES RELATIVE PERCENT: 9.8 %
NEUTROPHILS ABSOLUTE: 3.3 K/UL (ref 1.7–7.7)
NEUTROPHILS RELATIVE PERCENT: 60.3 %
PDW BLD-RTO: 14.1 % (ref 12.4–15.4)
PLATELET # BLD: 149 K/UL (ref 135–450)
PLATELET SLIDE REVIEW: ADEQUATE
PMV BLD AUTO: 11.3 FL (ref 5–10.5)
POTASSIUM SERPL-SCNC: 5.1 MMOL/L (ref 3.5–5.1)
RBC # BLD: 4.93 M/UL (ref 4.2–5.9)
RHEUMATOID FACTOR: <10 IU/ML
SODIUM BLD-SCNC: 141 MMOL/L (ref 136–145)
T4 FREE: 1.2 NG/DL (ref 0.9–1.8)
TOTAL PROTEIN: 7.3 G/DL (ref 6.4–8.2)
TRIGL SERPL-MCNC: 198 MG/DL (ref 0–150)
TSH SERPL DL<=0.05 MIU/L-ACNC: 2.28 UIU/ML (ref 0.27–4.2)
VLDLC SERPL CALC-MCNC: 40 MG/DL
WBC # BLD: 5.5 K/UL (ref 4–11)

## 2019-09-06 PROCEDURE — G8417 CALC BMI ABV UP PARAM F/U: HCPCS | Performed by: INTERNAL MEDICINE

## 2019-09-06 PROCEDURE — G8427 DOCREV CUR MEDS BY ELIG CLIN: HCPCS | Performed by: INTERNAL MEDICINE

## 2019-09-06 PROCEDURE — 3017F COLORECTAL CA SCREEN DOC REV: CPT | Performed by: INTERNAL MEDICINE

## 2019-09-06 PROCEDURE — 1036F TOBACCO NON-USER: CPT | Performed by: INTERNAL MEDICINE

## 2019-09-06 PROCEDURE — 99214 OFFICE O/P EST MOD 30 MIN: CPT | Performed by: INTERNAL MEDICINE

## 2019-09-06 RX ORDER — METOPROLOL TARTRATE 100 MG/1
TABLET ORAL
Refills: 3 | COMMUNITY
Start: 2019-06-16 | End: 2020-10-08 | Stop reason: SDUPTHER

## 2019-09-06 ASSESSMENT — ENCOUNTER SYMPTOMS
DIARRHEA: 0
NAUSEA: 0
SHORTNESS OF BREATH: 0

## 2019-09-07 LAB
ANTI-DSDNA IGG: <1 IU/ML (ref 0–9)
ANTI-NUCLEAR ANTIBODY (ANA): NEGATIVE

## 2019-09-26 DIAGNOSIS — I10 ESSENTIAL HYPERTENSION: Chronic | ICD-10-CM

## 2019-09-26 RX ORDER — METOPROLOL TARTRATE 100 MG/1
TABLET ORAL
Qty: 180 TABLET | Refills: 3 | Status: SHIPPED | OUTPATIENT
Start: 2019-09-26 | End: 2020-02-06

## 2019-09-27 DIAGNOSIS — J31.0 RHINITIS: ICD-10-CM

## 2019-09-27 DIAGNOSIS — F33.1 MODERATE EPISODE OF RECURRENT MAJOR DEPRESSIVE DISORDER (HCC): Chronic | ICD-10-CM

## 2019-09-27 RX ORDER — ESCITALOPRAM OXALATE 10 MG/1
10 TABLET ORAL DAILY
Qty: 30 TABLET | Refills: 0 | OUTPATIENT
Start: 2019-09-27

## 2019-09-27 RX ORDER — AMITRIPTYLINE HYDROCHLORIDE 25 MG/1
TABLET, FILM COATED ORAL
Qty: 30 TABLET | Refills: 0 | Status: SHIPPED | OUTPATIENT
Start: 2019-09-27 | End: 2019-11-01 | Stop reason: SDUPTHER

## 2019-09-27 RX ORDER — FLUTICASONE PROPIONATE 50 MCG
SPRAY, SUSPENSION (ML) NASAL
Qty: 1 BOTTLE | Refills: 3 | Status: SHIPPED | OUTPATIENT
Start: 2019-09-27 | End: 2020-02-06

## 2019-11-01 RX ORDER — AMITRIPTYLINE HYDROCHLORIDE 25 MG/1
TABLET, FILM COATED ORAL
Qty: 30 TABLET | Refills: 2 | Status: ON HOLD | OUTPATIENT
Start: 2019-11-01 | End: 2020-08-19

## 2019-11-14 RX ORDER — TADALAFIL 5 MG/1
5 TABLET ORAL PRN
Qty: 30 TABLET | Refills: 3 | Status: SHIPPED | OUTPATIENT
Start: 2019-11-14 | End: 2020-02-06

## 2019-12-16 ENCOUNTER — HOSPITAL ENCOUNTER (OUTPATIENT)
Dept: GENERAL RADIOLOGY | Age: 59
Discharge: HOME | End: 2019-12-16
Payer: MEDICARE

## 2019-12-16 ENCOUNTER — HOSPITAL ENCOUNTER (OUTPATIENT)
Age: 59
Discharge: HOME | End: 2019-12-16
Payer: MEDICARE

## 2019-12-16 ENCOUNTER — OFFICE VISIT (OUTPATIENT)
Dept: INTERNAL MEDICINE CLINIC | Age: 59
End: 2019-12-16
Payer: MEDICARE

## 2019-12-16 VITALS
HEART RATE: 50 BPM | BODY MASS INDEX: 31.97 KG/M2 | HEIGHT: 72 IN | SYSTOLIC BLOOD PRESSURE: 124 MMHG | DIASTOLIC BLOOD PRESSURE: 78 MMHG | WEIGHT: 236 LBS

## 2019-12-16 DIAGNOSIS — M75.52 DELTOID BURSITIS, LEFT: ICD-10-CM

## 2019-12-16 DIAGNOSIS — G47.33 OBSTRUCTIVE SLEEP APNEA SYNDROME: Chronic | ICD-10-CM

## 2019-12-16 DIAGNOSIS — N52.9 ERECTILE DYSFUNCTION, UNSPECIFIED ERECTILE DYSFUNCTION TYPE: ICD-10-CM

## 2019-12-16 DIAGNOSIS — F33.1 MODERATE EPISODE OF RECURRENT MAJOR DEPRESSIVE DISORDER (HCC): Chronic | ICD-10-CM

## 2019-12-16 DIAGNOSIS — F43.22 ADJUSTMENT DISORDER WITH ANXIOUS MOOD: ICD-10-CM

## 2019-12-16 DIAGNOSIS — I48.0 PAROXYSMAL ATRIAL FIBRILLATION (HCC): ICD-10-CM

## 2019-12-16 DIAGNOSIS — I10 ESSENTIAL HYPERTENSION: Primary | Chronic | ICD-10-CM

## 2019-12-16 DIAGNOSIS — E78.2 MIXED HYPERLIPIDEMIA: ICD-10-CM

## 2019-12-16 PROCEDURE — 3017F COLORECTAL CA SCREEN DOC REV: CPT | Performed by: INTERNAL MEDICINE

## 2019-12-16 PROCEDURE — 99213 OFFICE O/P EST LOW 20 MIN: CPT | Performed by: INTERNAL MEDICINE

## 2019-12-16 PROCEDURE — 1036F TOBACCO NON-USER: CPT | Performed by: INTERNAL MEDICINE

## 2019-12-16 PROCEDURE — G8417 CALC BMI ABV UP PARAM F/U: HCPCS | Performed by: INTERNAL MEDICINE

## 2019-12-16 PROCEDURE — 73030 X-RAY EXAM OF SHOULDER: CPT

## 2019-12-16 PROCEDURE — G8427 DOCREV CUR MEDS BY ELIG CLIN: HCPCS | Performed by: INTERNAL MEDICINE

## 2019-12-16 PROCEDURE — G8484 FLU IMMUNIZE NO ADMIN: HCPCS | Performed by: INTERNAL MEDICINE

## 2019-12-16 RX ORDER — TADALAFIL 20 MG/1
20 TABLET ORAL DAILY PRN
Qty: 10 TABLET | Refills: 5 | Status: SHIPPED | OUTPATIENT
Start: 2019-12-16 | End: 2020-02-06

## 2019-12-16 ASSESSMENT — ENCOUNTER SYMPTOMS
COUGH: 0
DIARRHEA: 0
BLOOD IN STOOL: 0
SHORTNESS OF BREATH: 0
CONSTIPATION: 0

## 2019-12-18 ENCOUNTER — NURSE ONLY (OUTPATIENT)
Dept: INTERNAL MEDICINE CLINIC | Age: 59
End: 2019-12-18

## 2019-12-18 DIAGNOSIS — M25.512 ACUTE PAIN OF LEFT SHOULDER: Primary | ICD-10-CM

## 2020-02-05 ENCOUNTER — TELEPHONE (OUTPATIENT)
Dept: CARDIOLOGY CLINIC | Age: 60
End: 2020-02-05

## 2020-02-05 PROBLEM — E66.811 OBESITY (BMI 30.0-34.9): Status: ACTIVE | Noted: 2020-02-05

## 2020-02-05 PROBLEM — E66.9 OBESITY (BMI 30.0-34.9): Status: ACTIVE | Noted: 2020-02-05

## 2020-02-05 RX ORDER — DILTIAZEM HYDROCHLORIDE 120 MG/1
120 CAPSULE, COATED, EXTENDED RELEASE ORAL DAILY
Qty: 90 CAPSULE | Refills: 3 | Status: ON HOLD | OUTPATIENT
Start: 2020-02-05 | End: 2020-09-14 | Stop reason: HOSPADM

## 2020-02-05 NOTE — TELEPHONE ENCOUNTER
Cortez Aly stopped by the office to see if he still needs to be taking IC Diltiazem 120. He wants a phone call not just a refill. He does not want to take this if he doesn't need to.   Thanks

## 2020-02-05 NOTE — PROGRESS NOTES
[address]   Electrophysiology  MAURIZIO Reyna  Attending EP: Dr. Reeves Code    Date: 2/6/2020  I had the privilege of visiting Tunde Ramos in the office. Chief Complaint:   Chief Complaint   Patient presents with    Follow-up     No complaints     Atrial Fibrillation    Hypertension     History of Present Illness: History obtained from patient and medical record. Tunde Ramos is 60 y.o. male with a past medical history of atrial fibrillation, KATELIN, HTN, and Parkinson's disease. Hx of atrial fibrillation/flutter at [facility] in the late 90s. S/p RFCA with PVI and CFAE ablation (2/25/16). S/p DCCV (4/6/16)    -Interval history: Today, Tunde Ramos is being seen for follow-up. He is doing well. He has had no episodes of atrial fibrillation. He is retired and keeps busy with his family/house. He checks his blood pressure at home and it runs \"120/130s\" with a heart rate of 60-70s. He wants to stop taking his cardizem as he tried to reduce his overall amount of medication. He stopped taking aspirin a few months ago due to frequent bruising and does not want to resume it. He has Xarelto at home, which he takes for episodic afib. He is compliant with his medications. He does not use his CPAP due to numerous issues and is not interested in wearing it anymore. Denies having chest pain, palpitations, shortness of breath, orthopnea/PND, cough, or dizziness at the time of this visit. With regard to medication therapy the patient has been compliant with prescribed regimen. They have tolerated therapy to date. Allergies:  No Known Allergies    Home Medications:  Prior to Visit Medications    Medication Sig Taking?  Authorizing Provider   carbidopa-levodopa (SINEMET)  MG per tablet Take 3 tablets by mouth 4 times daily Yes EMILIA Reyna CNP   diltiazem (CARDIZEM CD) 120 MG extended release capsule Take 1 capsule by mouth daily Yes Amina Garcia MD   amitriptyline (ELAVIL) 25 MG tablet TAKE 1 TABLET BY MOUTH EVERY NIGHT AT BEDTIME Yes Yolande Kocher, MD   metoprolol (LOPRESSOR) 100 MG tablet TAKE 1 TABLET BY MOUTH TWICE A DAY Yes Historical Provider, MD   ibuprofen (ADVIL;MOTRIN) 800 MG tablet TAKE 1 TABLET BY MOUTH EVERY 8 HOURS AS NEEDED FOR PAIN Yes Yolande Kocher, MD      Past Medical History:  Past Medical History:   Diagnosis Date    Anxiety     Atrial fibrillation ([address]     Atrial flutter ([address]     Cervical arthritis 3/30/2012    CTS (carpal tunnel syndrome) 3/13/2013    Essential tremor 2017    Hypertension     KATELIN (obstructive sleep apnea)     DOES NOT USE C-PAP    Osteoarthritis of back 11/6/2014    Osteoarthritis of right hip 11/6/2014    S/P ablation of atrial fibrillation 2/25/2016    Radiofrequency ablation of atrial fibrillation and pulmonary veins isolation Additional ablation of complex atrial fractionated electrogram     Sinusitis     Trigger finger 12/10/2014     Past Surgical History:    has a past surgical history that includes Atrial ablation surgery (1998); Colonoscopy (10/29/2007); back surgery (1984); Toe Surgery; Salivary gland surgery; Carpal tunnel release (Right, 11/19/2013); Carpal tunnel release (Left, 12-10-13); Atrial ablation surgery (2/25/16); Cardioversion (4/6/16); Lumbar spine surgery (1984); Ankle surgery (2015); and Total hip arthroplasty (2016). Social History:  Reviewed. reports that he quit smoking about 23 years ago. He has a 15.00 pack-year smoking history. He has never used smokeless tobacco. He reports current alcohol use of about 20.0 standard drinks of alcohol per week. He reports previous drug use. Drug: Marijuana. Family History:  Reviewed. family history includes Alcohol Abuse in his father; High Blood Pressure in his mother; High Cholesterol in his mother; Other in an other family member.      Review of System:  · Constitutional: Negative for fever, night sweats, chills, weight changes, or weakness  · Skin: Negative visit    LABS    CBC:   Lab Results   Component Value Date    WBC 5.5 2019    HGB 16.4 2019    HCT 50.5 2019    .4 (H) 2019     2019     BMP:   Lab Results   Component Value Date    CREATININE 0.8 2019    BUN 11 2019     2019    K 5.1 2019    CL 98 (L) 2019    CO2 26 2019     CrCl cannot be calculated (Patient's most recent lab result is older than the maximum 120 days allowed. ). Thyroid:   Lab Results   Component Value Date    TSH 2.28 2019     Lipid Panel:   Lab Results   Component Value Date    CHOL 191 2019    HDL 39 2019    HDL 42 2011    TRIG 198 2019     LFTs:  Lab Results   Component Value Date    ALT <5 (L) 2019    AST 18 2019    ALKPHOS 110 2019    BILITOT 0.7 2019     Coags:   Lab Results   Component Value Date    PROTIME 11.0 2016    INR 0.97 2016     EC2020  - Sinus bradycardia, rate 57, QRS 94, QTc 409    Echo: 3/13  Preserved LV systolic function with EF of 55%  Reversal of E/A inflow velocities across the mitral valve suggesting impaired left ventricular relaxation. Mild mitral regurgitation is present. Mildly dilated left atrium. MASSIMO (Pre-ablation): 16  Normal left ventricle size, wall thickness and systolic function with an estimated ejection fraction of 55%. Mild mitral regurgitation is present. There is no evidence of mass or thrombus in the left atrium or appendage. The aortic root is mildly dilated. GXT: None    Assessment:    1. Paroxysmal Atrial Fibrillation  - S/p RFCA with PVI and CFAE ablation (16)  - S/p DCCV (16)    - Currently in NSR/SB  - Continue metoprolol 100 mg BID   ~ Ok to trial weaning cardizem. Take every other day for one month then stop if no recurrent afib.  Instructed him that he may take it PRN for future episodes    - [id number] score: 1 (HTN) ; [id number] Vasc score and anticoagulation discussed.   ~ May take Xarelto PRN for episodes of atrial fibrillation  ~ He stopped taking ASA due to bruising    - Afib risk factors including age, HTN, obesity, inactivity and KATELIN were discussed with patient. Risk factor modification recommended   ~ TSH 2.28 (9/19)       - Treatment options including cardioversion, rate control strategy, antiarrhythmics, anticoagulation and possible ablation were discussed with patient. Risks, benefits and alternative of each treatment options were explained. All questions answered    2. HTN  - Controlled: Goal <130/80  - Continue current medications  - Encouraged to monitor and log BP readings at home, then bring log to next visit   ~ Instructed to notify our office or PCP if SBP consistently >140  - Discussed importance of low sodium diet, weight control and exercise    3. KATELIN  - Positive for sleep apnea  - Non-compliant with CPAP therapy. Encourage to use CPAP to prevent long term effects of untreated KATELIN    4. Obesity  - Body mass index is 31.62 kg/m². - Complicating assessment and treatment. Placing patient at risk for multiple co-morbidities as well as early death and contributing to the patient's presentation  - Discussed weight loss and patient was encouraged to reduce calorie intake and increase exercise    Plan:  1. Ok to start weaning yourself off cardizem. May take every other day for one month, then stop. Resume taking it daily if your afib occurs  2. Check your blood pressure while stopping cardizem. Let us know if your blood pressure is consistently >140  3. Work on exercise and weight loss   4.  Call our office if you start having more frequent episodes of afib    F/U: Follow-up with EP in 8 months  -Call Trinidad Hollingsworth at [phone] with any questions    Diet & Exercise:   The patient is counseled to follow a low salt diet to assure blood pressure remains controlled for cardiovascular risk factor modification   The patient is counseled to avoid excess caffeine, and energy drinks as this may exacerbated ectopy and arrhythmia   The patient is counseled to lose weight to control cardiovascular risk factors   Exercise program discussed: To improve overall cardiovascular health, the patient is instructed to increase cardiovascular related activities with a goal of 150 min/week of moderate level activity or 10,000 steps per day. Encouraged to perform as much activity as tolerated    Quality Metrics  1. Tobacco Cessation Counseling: N/A  2. Retake of BP if >140/90: N/A  3. Documentation to PCP: Note sent to PCP office visit  4. CAD patient on anti-platelet: N/A   5.   CAD patient on STATIN therapy: N/A   6. Patient with history of CHF and atrial fibrillation on anticoagulation: No due to low [id number] score       I have addressed the patient's cardiac risk factors and adjusted pharmacologic treatment as needed. In addition, I have reinforced the need for patient directed risk factor modification. I independently reviewed the ECG    All questions and concerns were addressed with the patient. Alternatives to treatment were discussed. Thank you for allowing to us to participate in the care of [address] [place] [address].     EMILIA Amaya-THANG  [address]   Office: [phone]

## 2020-02-05 NOTE — TELEPHONE ENCOUNTER
Pt was supposed to f/u in 6 months. Does the pt need to be seen for this decision? Last OV with RMM 5/21/19  - Atrial fibrillation/ flutter               - S/p PVI and CFAE ablation with restoration of sinus rhythm on 2/25/16              - no recurrence since DCCV 4/6/16              Off antiarrhythmic therapy and doing well. Also off anticoagulation. Xarelto stopped before.               Diltiazem 120 mg daily              ASA               - Can take Xarelto prn if A fib recurs as patient believes he will recognize A fib again.                 No recurrence.

## 2020-02-06 ENCOUNTER — OFFICE VISIT (OUTPATIENT)
Dept: CARDIOLOGY CLINIC | Age: 60
End: 2020-02-06
Payer: MEDICARE

## 2020-02-06 VITALS
BODY MASS INDEX: 31.57 KG/M2 | DIASTOLIC BLOOD PRESSURE: 64 MMHG | WEIGHT: 233.12 LBS | SYSTOLIC BLOOD PRESSURE: 110 MMHG | HEIGHT: 72 IN | RESPIRATION RATE: 19 BRPM | OXYGEN SATURATION: 98 % | HEART RATE: 64 BPM

## 2020-02-06 PROCEDURE — 3017F COLORECTAL CA SCREEN DOC REV: CPT | Performed by: NURSE PRACTITIONER

## 2020-02-06 PROCEDURE — 99214 OFFICE O/P EST MOD 30 MIN: CPT | Performed by: NURSE PRACTITIONER

## 2020-02-06 PROCEDURE — 93000 ELECTROCARDIOGRAM COMPLETE: CPT | Performed by: NURSE PRACTITIONER

## 2020-02-06 PROCEDURE — G8427 DOCREV CUR MEDS BY ELIG CLIN: HCPCS | Performed by: NURSE PRACTITIONER

## 2020-02-06 PROCEDURE — G8417 CALC BMI ABV UP PARAM F/U: HCPCS | Performed by: NURSE PRACTITIONER

## 2020-02-06 PROCEDURE — 1036F TOBACCO NON-USER: CPT | Performed by: NURSE PRACTITIONER

## 2020-02-06 PROCEDURE — G8484 FLU IMMUNIZE NO ADMIN: HCPCS | Performed by: NURSE PRACTITIONER

## 2020-02-06 NOTE — PATIENT INSTRUCTIONS
Plan:  1. Ok to start weaning yourself off cardizem. May take every other day for one month, then stop. Resume taking it daily if your afib occurs  2. Check your blood pressure while stopping cardizem. Let us know if your blood pressure is consistently >140  3. Work on exercise and weight loss   4. Call our office if you start having more frequent episodes of afib    F/U: Follow-up with EP in 8 months  -Call Rachna at [phone] with any questions    Diet & Exercise:   The patient is counseled to follow a low salt diet to assure blood pressure remains controlled for cardiovascular risk factor modification   The patient is counseled to avoid excess caffeine, and energy drinks as this may exacerbated ectopy and arrhythmia   The patient is counseled to lose weight to control cardiovascular risk factors   Exercise program discussed: To improve overall cardiovascular health, the patient is instructed to increase cardiovascular related activities with a goal of 150 min/week of moderate level activity or 10,000 steps per day.  Encouraged to perform as much activity as tolerated

## 2020-06-17 ENCOUNTER — OFFICE VISIT (OUTPATIENT)
Dept: INTERNAL MEDICINE CLINIC | Age: 60
End: 2020-06-17
Payer: MEDICARE

## 2020-06-17 VITALS
OXYGEN SATURATION: 94 % | DIASTOLIC BLOOD PRESSURE: 78 MMHG | HEART RATE: 58 BPM | SYSTOLIC BLOOD PRESSURE: 130 MMHG | WEIGHT: 236 LBS | BODY MASS INDEX: 32.01 KG/M2 | TEMPERATURE: 97.6 F

## 2020-06-17 DIAGNOSIS — F33.1 MODERATE EPISODE OF RECURRENT MAJOR DEPRESSIVE DISORDER (HCC): Chronic | ICD-10-CM

## 2020-06-17 DIAGNOSIS — E78.2 MIXED HYPERLIPIDEMIA: ICD-10-CM

## 2020-06-17 DIAGNOSIS — Z12.5 PROSTATE CANCER SCREENING: ICD-10-CM

## 2020-06-17 DIAGNOSIS — I10 ESSENTIAL HYPERTENSION: ICD-10-CM

## 2020-06-17 DIAGNOSIS — I48.0 PAROXYSMAL ATRIAL FIBRILLATION (HCC): ICD-10-CM

## 2020-06-17 LAB
BASOPHILS ABSOLUTE: 0 K/UL (ref 0–0.2)
BASOPHILS RELATIVE PERCENT: 0.4 %
EOSINOPHILS ABSOLUTE: 0.3 K/UL (ref 0–0.6)
EOSINOPHILS RELATIVE PERCENT: 4.7 %
HCT VFR BLD CALC: 49.2 % (ref 40.5–52.5)
HEMOGLOBIN: 16.1 G/DL (ref 13.5–17.5)
LYMPHOCYTES ABSOLUTE: 1.5 K/UL (ref 1–5.1)
LYMPHOCYTES RELATIVE PERCENT: 25.1 %
MCH RBC QN AUTO: 32.6 PG (ref 26–34)
MCHC RBC AUTO-ENTMCNC: 32.6 G/DL (ref 31–36)
MCV RBC AUTO: 100 FL (ref 80–100)
MONOCYTES ABSOLUTE: 0.7 K/UL (ref 0–1.3)
MONOCYTES RELATIVE PERCENT: 11.1 %
NEUTROPHILS ABSOLUTE: 3.6 K/UL (ref 1.7–7.7)
NEUTROPHILS RELATIVE PERCENT: 58.7 %
PDW BLD-RTO: 15.4 % (ref 12.4–15.4)
PLATELET # BLD: 152 K/UL (ref 135–450)
PMV BLD AUTO: 10.9 FL (ref 5–10.5)
RBC # BLD: 4.92 M/UL (ref 4.2–5.9)
WBC # BLD: 6.1 K/UL (ref 4–11)

## 2020-06-17 PROCEDURE — 1036F TOBACCO NON-USER: CPT | Performed by: INTERNAL MEDICINE

## 2020-06-17 PROCEDURE — 3017F COLORECTAL CA SCREEN DOC REV: CPT | Performed by: INTERNAL MEDICINE

## 2020-06-17 PROCEDURE — G8427 DOCREV CUR MEDS BY ELIG CLIN: HCPCS | Performed by: INTERNAL MEDICINE

## 2020-06-17 PROCEDURE — G8417 CALC BMI ABV UP PARAM F/U: HCPCS | Performed by: INTERNAL MEDICINE

## 2020-06-17 PROCEDURE — 99214 OFFICE O/P EST MOD 30 MIN: CPT | Performed by: INTERNAL MEDICINE

## 2020-06-17 RX ORDER — METHYLPREDNISOLONE 4 MG/1
TABLET ORAL
COMMUNITY
Start: 2020-06-16 | End: 2020-06-21

## 2020-06-17 ASSESSMENT — PATIENT HEALTH QUESTIONNAIRE - PHQ9
SUM OF ALL RESPONSES TO PHQ QUESTIONS 1-9: 0
SUM OF ALL RESPONSES TO PHQ9 QUESTIONS 1 & 2: 0
1. LITTLE INTEREST OR PLEASURE IN DOING THINGS: 0
SUM OF ALL RESPONSES TO PHQ QUESTIONS 1-9: 0
2. FEELING DOWN, DEPRESSED OR HOPELESS: 0

## 2020-06-17 ASSESSMENT — ENCOUNTER SYMPTOMS: SHORTNESS OF BREATH: 0

## 2020-06-17 NOTE — PROGRESS NOTES
SUBJECTIVE:  Patient ID: Susan Lomax is a 60 y.o. y.o. male     HPI    Susan Lomax returns to the office for follow up of osteoarthritis. Pain is located in the left ankle(s). He has been taking medication for treatment of symptoms for day(s). Medications for treatment of pain include Prednisone. Patient reports fair relief of symptoms. Pain is not associated with other symptoms. Atrial fibrillation has been stable since he had an ablation. He continues to take the Cardizem as prescribed. Patient has no side effects related to taking the medication. PHQ Scores 6/17/2020 10/24/2018 9/26/2018 8/23/2018 8/18/2017 7/8/2015   PHQ2 Score 0 2 3 4 0 0   PHQ9 Score 0 10 13 17 0 0     Interpretation of Total Score Depression Severity: 1-4 = Minimal depression, 5-9 = Mild depression, 10-14 = Moderate depression, 15-19 = Moderately severe depression, 20-27 = Severe depression    The 10-year ASCVD risk score (Daya Alvarado, et al., 2013) is: 11.8%    Values used to calculate the score:      Age: 60 years      Sex: Male      Is Non- : No      Diabetic: No      Tobacco smoker: No      Systolic Blood Pressure: 108 mmHg      Is BP treated: Yes      HDL Cholesterol: 39 mg/dL      Total Cholesterol: 191 mg/dL    Review of Systems   Respiratory: Negative for shortness of breath. Cardiovascular: Negative for palpitations. Musculoskeletal: Positive for arthralgias (left ankle pain and swelling). OBJECTIVE:    /78   Pulse 58   Temp 97.6 °F (36.4 °C) (Infrared)   Wt 236 lb (107 kg)   SpO2 94%   BMI 32.01 kg/m²      Physical Exam  Vitals signs reviewed. Constitutional:       General: He is not in acute distress. Appearance: He is well-developed. He is not diaphoretic. HENT:      Head: Normocephalic and atraumatic. Cardiovascular:      Rate and Rhythm: Normal rate and regular rhythm. Pulses: Normal pulses. Heart sounds: Normal heart sounds. No murmur.  No friction rub. No gallop. Pulmonary:      Effort: Pulmonary effort is normal. No respiratory distress. Breath sounds: No stridor. No wheezing, rhonchi or rales. Chest:      Chest wall: No tenderness. Neurological:      Mental Status: He is alert and oriented to person, place, and time. Cranial Nerves: No cranial nerve deficit. Psychiatric:         Behavior: Behavior normal.         Thought Content: Thought content normal.         Judgment: Judgment normal.          Current Outpatient Medications:     methylPREDNISolone (MEDROL, SONJA,) 4 MG tablet, Take by mouth, Disp: , Rfl:     carbidopa-levodopa (SINEMET)  MG per tablet, Take 3 tablets by mouth 4 times daily, Disp: 120 tablet, Rfl: 1    diltiazem (CARDIZEM CD) 120 MG extended release capsule, Take 1 capsule by mouth daily, Disp: 90 capsule, Rfl: 3    amitriptyline (ELAVIL) 25 MG tablet, TAKE 1 TABLET BY MOUTH EVERY NIGHT AT BEDTIME, Disp: 30 tablet, Rfl: 2    metoprolol (LOPRESSOR) 100 MG tablet, TAKE 1 TABLET BY MOUTH TWICE A DAY, Disp: , Rfl: 3    ibuprofen (ADVIL;MOTRIN) 800 MG tablet, TAKE 1 TABLET BY MOUTH EVERY 8 HOURS AS NEEDED FOR PAIN, Disp: 270 tablet, Rfl: 0    Assessment/Plan:  David Guzman was seen today for hypertension and hyperlipidemia. Diagnoses and all orders for this visit:    Paroxysmal atrial fibrillation ([facility]  Comments:  Taking Cardizem  Orders:  -     T4, Free; Future  -     TSH without Reflex; Future    Moderate episode of recurrent major depressive disorder ([facility] [address]  Comments:  Resolved    Orders:  -     Comprehensive Metabolic Panel; Future    Obstructive sleep apnea syndrome  Comments:  He is using his CPAP    Parkinsonian tremor (HCC)  Comments:  Taking Sinemet      Prostate cancer screening  -     Psa screening; Future    Mixed hyperlipidemia  -     Comprehensive Metabolic Panel; Future  -     Lipid Panel;  Future    Essential hypertension  -     CBC Auto Differential; Future  -     Comprehensive Metabolic Panel; Future    Colon cancer screening  -     COLONOSCOPY (Screening)      Vladimir Zaragoza MD

## 2020-06-18 LAB
A/G RATIO: 2 (ref 1.1–2.2)
ALBUMIN SERPL-MCNC: 4.7 G/DL (ref 3.4–5)
ALP BLD-CCNC: 113 U/L (ref 40–129)
ALT SERPL-CCNC: 7 U/L (ref 10–40)
ANION GAP SERPL CALCULATED.3IONS-SCNC: 12 MMOL/L (ref 3–16)
AST SERPL-CCNC: 14 U/L (ref 15–37)
BILIRUB SERPL-MCNC: 0.7 MG/DL (ref 0–1)
BUN BLDV-MCNC: 11 MG/DL (ref 7–20)
CALCIUM SERPL-MCNC: 9.7 MG/DL (ref 8.3–10.6)
CHLORIDE BLD-SCNC: 101 MMOL/L (ref 99–110)
CHOLESTEROL, TOTAL: 160 MG/DL (ref 0–199)
CO2: 27 MMOL/L (ref 21–32)
CREAT SERPL-MCNC: 0.7 MG/DL (ref 0.8–1.3)
GFR AFRICAN AMERICAN: >60
GFR NON-AFRICAN AMERICAN: >60
GLOBULIN: 2.3 G/DL
GLUCOSE BLD-MCNC: 85 MG/DL (ref 70–99)
HDLC SERPL-MCNC: 41 MG/DL (ref 40–60)
LDL CHOLESTEROL CALCULATED: 86 MG/DL
POTASSIUM SERPL-SCNC: 4.9 MMOL/L (ref 3.5–5.1)
PROSTATE SPECIFIC ANTIGEN: 0.53 NG/ML (ref 0–4)
SODIUM BLD-SCNC: 140 MMOL/L (ref 136–145)
T4 FREE: 1.2 NG/DL (ref 0.9–1.8)
TOTAL PROTEIN: 7 G/DL (ref 6.4–8.2)
TRIGL SERPL-MCNC: 166 MG/DL (ref 0–150)
TSH SERPL DL<=0.05 MIU/L-ACNC: 2.03 UIU/ML (ref 0.27–4.2)
VLDLC SERPL CALC-MCNC: 33 MG/DL

## 2020-07-23 NOTE — PROGRESS NOTES
Patient not reached. Preop instructions left on voice mail. Number_[phone]________      DATE_7/30/2020_______ TIME_1200_______ARRIVAL_1030  FEC________      Nothing to eat or drink after midnight the night before,except for what the prep instructions call for. If you do not have the instructions or do not understand them please contact your doctors office. Follow any instructions your doctors office has given you including what medications to take the AM of your procedure and which ones to hold. You may use your inhalers. If you take a long acting insulin the penny prior please cut the dose in half and take no diabetic medications that AM.Follow specific doctors office instructions regarding blood thinners and if they want you to hold and for how long. If you are on a blood thinner and have no instructions please contact the office and ask. Dress comfortably,bring your insurance card,picture ID,and a complete list of medications, including supplements. You must have a responsible adult to stay with you during the procedure,drive you home and stay with you. [employer] phone number [phone] for any questions. Patient instructed to get their COVID-19 test done as directed by their doctor (5-7 days prior to procedure)  VM instructions to test here 7/24/2020. There is a one visitor policy at [facility] for the pre-op phase only for surgery and endoscopy cases. The visitor is expected to leave the facility after the patient is taken back for the procedure. Pain management is NO VISITOR policyThe patients ride is expected to remain in the car with a cell phone for communication. If the ride is leaving the hospital grounds please make sure they are back in time for pickup. Have the patient inform the staff on arrival what their rides plans are while the patient is in the facility. At the MAIN there is one visitor allowed. Please note that the visitor policy is subject to change.

## 2020-07-24 ENCOUNTER — OFFICE VISIT (OUTPATIENT)
Dept: PRIMARY CARE CLINIC | Age: 60
End: 2020-07-24
Payer: MEDICARE

## 2020-07-24 PROCEDURE — 99211 OFF/OP EST MAY X REQ PHY/QHP: CPT | Performed by: NURSE PRACTITIONER

## 2020-07-24 NOTE — PATIENT INSTRUCTIONS
hands are visibly dirty.   ; Avoid touching: Avoid touching your eyes, nose, and mouth with unwashed hands. Handwashing Tips   ; Wet your hands with clean, running water (warm or cold), turn off the tap, and apply soap.  ; Lather your hands by rubbing them together with the soap. Lather the backs of your hands, between your fingers, and under your nails. ; Scrub your hands for at least 20 seconds. Need a timer? Hum the [place] from beginning to end twice.  ; Rinse your hands well under clean, running water.  ; Dry your hands using a clean towel or air dry them. Avoid sharing personal household items   ; Do not share: You should not share dishes, drinking glasses, cups, eating utensils, towels, or bedding with other people or pets in your home.   ; Wash thoroughly after use: After using these items, they should be washed thoroughly with soap and water. Clean all high-touch surfaces everyday   ; Clean and disinfect: Practice routine cleaning of high touch surfaces.  ; High touch surfaces include counters, tabletops, doorknobs, bathroom fixtures, toilets, phones, keyboards, tablets, and bedside tables.  ; Disinfect areas with bodily fluids: Also, clean any surfaces that may have blood, stool, or body fluids on them.   ; Household : Use a household cleaning spray or wipe, according to the label instructions. Labels contain instructions for safe and effective use of the cleaning product including precautions you should take when applying the product, such as wearing gloves and making sure you have good ventilation during use of the product.     Monitor your symptoms   Seek medical attention: Seek prompt medical attention if your illness is worsening     (e.g., difficulty breathing).   ; Call your doctor: Before seeking care, call your healthcare provider and tell them that you have, or are being evaluated for, COVID-19.   ; Wear a facemask when sick: Put on a facemask before you enter the facility. These steps will help the healthcare provider's office to keep other people in the office or waiting room from getting infected or exposed. ; Alert health department: Ask your healthcare provider to call the local or state health department. Persons who are placed under active monitoring or facilitated self-monitoring should follow instructions provided by their local health department or occupational health professionals, as appropriate.  ; Call 911 if you have a medical emergency: If you have a medical emergency and need to call 911, notify the dispatch personnel that you have, or are being evaluated for COVID-19. If possible, put on a facemask before emergency medical services arrive. Thank you for enrolling in [address] [facility]. Please follow the instructions below to securely access your online medical record. [employer] allows you to send messages to your doctor, view your test results, renew your prescriptions, schedule appointments, and more. How Do I Sign Up? 1. In your Internet browser, go to [email or web address]  2. Click on the Sign Up Now link in the Sign In box. You will see the New Member Sign Up page. 3. Enter your [employer] Access Code exactly as it appears below. You will not need to use this code after youve completed the sign-up process. If you do not sign up before the expiration date, you must request a new code. [employer] Access Code: Activation code not generated  Current [employer] Status: Active    4. Enter your Social Security Number ([id number]) and Date of Birth (mm/dd/yyyy) as indicated and click Submit. You will be taken to the next sign-up page. 5. Create a [employer] ID. This will be your [employer] login ID and cannot be changed, so think of one that is secure and easy to remember. 6. Create a [employer] password. You can change your password at any time. 7. Enter your Password Reset Question and Answer.  This can be used at a later time if you forget your password. 8. Enter your e-mail address. You will receive e-mail notification when new information is available in [address] 9. Click Sign Up. You can now view your medical record. Additional Information  If you have questions, please contact your physician practice where you receive care. Remember, MyChart is NOT to be used for urgent needs. For medical emergencies, dial 911.

## 2020-07-25 LAB
REPORT: NORMAL
SARS-COV-2: NOT DETECTED
THIS TEST SENT TO: NORMAL

## 2020-07-30 ENCOUNTER — ANESTHESIA (OUTPATIENT)
Dept: ENDOSCOPY | Age: 60
End: 2020-07-30
Payer: MEDICARE

## 2020-07-30 ENCOUNTER — HOSPITAL ENCOUNTER (OUTPATIENT)
Age: 60
Setting detail: OUTPATIENT SURGERY
Discharge: HOME | End: 2020-07-30
Attending: INTERNAL MEDICINE | Admitting: INTERNAL MEDICINE
Payer: MEDICARE

## 2020-07-30 ENCOUNTER — ANESTHESIA EVENT (OUTPATIENT)
Dept: ENDOSCOPY | Age: 60
End: 2020-07-30
Payer: MEDICARE

## 2020-07-30 VITALS
SYSTOLIC BLOOD PRESSURE: 120 MMHG | TEMPERATURE: 97.4 F | RESPIRATION RATE: 16 BRPM | HEART RATE: 60 BPM | DIASTOLIC BLOOD PRESSURE: 72 MMHG | OXYGEN SATURATION: 96 % | WEIGHT: 235 LBS | BODY MASS INDEX: 31.83 KG/M2 | HEIGHT: 72 IN

## 2020-07-30 VITALS — DIASTOLIC BLOOD PRESSURE: 67 MMHG | OXYGEN SATURATION: 98 % | SYSTOLIC BLOOD PRESSURE: 103 MMHG

## 2020-07-30 PROCEDURE — 3609010600 HC COLONOSCOPY POLYPECTOMY SNARE/COLD BIOPSY: Performed by: INTERNAL MEDICINE

## 2020-07-30 PROCEDURE — 2709999900 HC NON-CHARGEABLE SUPPLY: Performed by: INTERNAL MEDICINE

## 2020-07-30 PROCEDURE — 6360000002 HC RX W HCPCS: Performed by: NURSE ANESTHETIST, CERTIFIED REGISTERED

## 2020-07-30 PROCEDURE — 3700000000 HC ANESTHESIA ATTENDED CARE: Performed by: INTERNAL MEDICINE

## 2020-07-30 PROCEDURE — 2500000003 HC RX 250 WO HCPCS: Performed by: NURSE ANESTHETIST, CERTIFIED REGISTERED

## 2020-07-30 PROCEDURE — 7100000010 HC PHASE II RECOVERY - FIRST 15 MIN: Performed by: INTERNAL MEDICINE

## 2020-07-30 PROCEDURE — 2580000003 HC RX 258: Performed by: NURSE ANESTHETIST, CERTIFIED REGISTERED

## 2020-07-30 PROCEDURE — 2580000003 HC RX 258: Performed by: ANESTHESIOLOGY

## 2020-07-30 PROCEDURE — 3700000001 HC ADD 15 MINUTES (ANESTHESIA): Performed by: INTERNAL MEDICINE

## 2020-07-30 PROCEDURE — 7100000011 HC PHASE II RECOVERY - ADDTL 15 MIN: Performed by: INTERNAL MEDICINE

## 2020-07-30 RX ORDER — LIDOCAINE HYDROCHLORIDE 20 MG/ML
INJECTION, SOLUTION INFILTRATION; PERINEURAL PRN
Status: DISCONTINUED | OUTPATIENT
Start: 2020-07-30 | End: 2020-07-30 | Stop reason: SDUPTHER

## 2020-07-30 RX ORDER — FEXOFENADINE HCL 180 MG/1
180 TABLET ORAL DAILY
COMMUNITY

## 2020-07-30 RX ORDER — SODIUM CHLORIDE 9 MG/ML
INJECTION, SOLUTION INTRAVENOUS CONTINUOUS PRN
Status: DISCONTINUED | OUTPATIENT
Start: 2020-07-30 | End: 2020-07-30 | Stop reason: SDUPTHER

## 2020-07-30 RX ORDER — SUVOREXANT 10 MG/1
TABLET, FILM COATED ORAL
Status: ON HOLD | COMMUNITY
End: 2020-08-19

## 2020-07-30 RX ORDER — SODIUM CHLORIDE 9 MG/ML
INJECTION, SOLUTION INTRAVENOUS CONTINUOUS
Status: DISCONTINUED | OUTPATIENT
Start: 2020-07-30 | End: 2020-07-30 | Stop reason: HOSPADM

## 2020-07-30 RX ORDER — PROPOFOL 10 MG/ML
INJECTION, EMULSION INTRAVENOUS PRN
Status: DISCONTINUED | OUTPATIENT
Start: 2020-07-30 | End: 2020-07-30 | Stop reason: SDUPTHER

## 2020-07-30 RX ORDER — PROPOFOL 10 MG/ML
INJECTION, EMULSION INTRAVENOUS CONTINUOUS PRN
Status: DISCONTINUED | OUTPATIENT
Start: 2020-07-30 | End: 2020-07-30 | Stop reason: SDUPTHER

## 2020-07-30 RX ADMIN — PROPOFOL 120 MG: 10 INJECTION, EMULSION INTRAVENOUS at 11:34

## 2020-07-30 RX ADMIN — PROPOFOL 100 MG: 10 INJECTION, EMULSION INTRAVENOUS at 11:41

## 2020-07-30 RX ADMIN — LIDOCAINE HYDROCHLORIDE 100 MG: 20 INJECTION, SOLUTION INFILTRATION; PERINEURAL at 11:33

## 2020-07-30 RX ADMIN — PROPOFOL 160 MCG/KG/MIN: 10 INJECTION, EMULSION INTRAVENOUS at 11:35

## 2020-07-30 RX ADMIN — PROPOFOL 120 MG: 10 INJECTION, EMULSION INTRAVENOUS at 11:48

## 2020-07-30 RX ADMIN — SODIUM CHLORIDE: 9 INJECTION, SOLUTION INTRAVENOUS at 11:10

## 2020-07-30 RX ADMIN — SODIUM CHLORIDE: 9 INJECTION, SOLUTION INTRAVENOUS at 11:20

## 2020-07-30 RX ADMIN — PROPOFOL 100 MG: 10 INJECTION, EMULSION INTRAVENOUS at 11:55

## 2020-07-30 ASSESSMENT — PULMONARY FUNCTION TESTS
PIF_VALUE: 0

## 2020-07-30 ASSESSMENT — PAIN SCALES - GENERAL
PAINLEVEL_OUTOF10: 0

## 2020-07-30 ASSESSMENT — PAIN - FUNCTIONAL ASSESSMENT: PAIN_FUNCTIONAL_ASSESSMENT: 0-10

## 2020-07-30 NOTE — ANESTHESIA POSTPROCEDURE EVALUATION
Department of Anesthesiology  Postprocedure Note    Patient: Thao Phillips  MRN: [id number]  YOB: 1960  Date of evaluation: 7/30/2020  Time:  12:19 PM     Procedure Summary     Date:  07/30/20 Room / Location:  [address] [place] [address]    Anesthesia Start:  8782 Anesthesia Stop:  6759    Procedure:  COLONOSCOPY POLYPECTOMY SNARE/COLD BIOPSY (N/A ) Diagnosis:  (COLON CANCER SCREENING Z12.11)    Surgeon:  Andrew Gonzalez MD Responsible Provider:  Tesfaye Kumar MD    Anesthesia Type:  MAC ASA Status:  4          Anesthesia Type: MAC    Mak Phase I: Mak Score: 10    Mak Phase II:      Last vitals: Reviewed and per EMR flowsheets.        Anesthesia Post Evaluation    Patient location during evaluation: PACU  Patient participation: complete - patient participated  Level of consciousness: awake and alert  Airway patency: patent  Nausea & Vomiting: no vomiting and no nausea  Complications: no  Cardiovascular status: hemodynamically stable  Respiratory status: acceptable  Hydration status: stable

## 2020-07-30 NOTE — H&P
[address] [place] [address]    Pre-operative History and Physical    Patient: Anh Quijano  : 1960  CSN:     History Obtained From:   Patient or guardian. HISTORY OF PRESENT ILLNESS:    The patient is a 60 y.o. male here for Endoscopy. Past Medical History:    Past Medical History:   Diagnosis Date    Anxiety     Atrial fibrillation ([address]     Atrial flutter ([address]     Cervical arthritis 3/30/2012    CTS (carpal tunnel syndrome) 3/13/2013    Essential tremor 2017    Hypertension     KATELIN (obstructive sleep apnea)     DOES NOT USE C-PAP    Osteoarthritis of back 2014    Osteoarthritis of right hip 2014    Parkinson disease ([address]     S/P ablation of atrial fibrillation 2016    Radiofrequency ablation of atrial fibrillation and pulmonary veins isolation Additional ablation of complex atrial fractionated electrogram     Sinusitis     Trigger finger 12/10/2014     Past Surgical History:    Past Surgical History:   Procedure Laterality Date    ANKLE SURGERY      ANKLE SURGERY Left     ATRIAL ABLATION SURGERY      ATRIAL ABLATION SURGERY  16    RFCA of AF and PVI, additional ablation complex atrial fractioned electrogram    BACK SURGERY  1984    lumbar patino    CARDIOVERSION  16    CARPAL TUNNEL RELEASE Right 2013    RIGHT CARPAL TUNNEL RELEASE      CARPAL TUNNEL RELEASE Left 12-10-13     CARPAL TUNNEL RELEASE                COLONOSCOPY  10/29/2007    LUMBAR SPINE SURGERY  1984    SALIVARY GLAND SURGERY      TOE SURGERY      TOTAL HIP ARTHROPLASTY Right 2016    right hip replacement     Medications Prior to Admission:   No current facility-administered medications on file prior to encounter. Current Outpatient Medications on File Prior to Encounter   Medication Sig Dispense Refill    Suvorexant (BELSOMRA) 10 MG TABS Take by mouth.       fexofenadine (ALLEGRA) 180 MG tablet Take 180 mg by mouth daily      carbidopa-levodopa (SINEMET)  MG per tablet Take 3 tablets by mouth 4 times daily 120 tablet 1    diltiazem (CARDIZEM CD) 120 MG extended release capsule Take 1 capsule by mouth daily 90 capsule 3    metoprolol (LOPRESSOR) 100 MG tablet TAKE 1 TABLET BY MOUTH TWICE A DAY  3    amitriptyline (ELAVIL) 25 MG tablet TAKE 1 TABLET BY MOUTH EVERY NIGHT AT BEDTIME 30 tablet 2        Allergies:  Patient has no known allergies. Social History:   Social History     Tobacco Use    Smoking status: Former Smoker     Packs/day: 1.00     Years: 15.00     Pack years: 15.00     Last attempt to quit: 1996     Years since quittin.2    Smokeless tobacco: Never Used   Substance Use Topics    Alcohol use: Yes     Alcohol/week: 20.0 standard drinks     Types: 20 Standard drinks or equivalent per week     Comment: weekends      Family History:   Family History   Problem Relation Age of Onset    High Blood Pressure Mother     High Cholesterol Mother     Alcohol Abuse Father     Other Other         afib    Heart Disease Neg Hx     Mental Illness Neg Hx        PHYSICAL EXAM:      BP (!) 141/81   Pulse 60   Temp 97 °F (36.1 °C) (Temporal)   Resp 16   Ht 6' (1.829 m)   Wt 235 lb (106.6 kg)   SpO2 99%   BMI 31.87 kg/m²  I        Heart:  RRR, normal s1s2    Lungs:  CTA and normal effort    Abdomen:   Soft, nt nd. ASSESSMENT AND PLAN:    1. Patient is a 60 y.o. male here for endoscopy with MAC sedation. 2.  Procedure options, risks and benefits reviewed with patient and/or guardian. They express understanding.

## 2020-07-30 NOTE — PROGRESS NOTES
To endo recovery from procedure room. VSS, awakens to voice, respirations easy and unlabored. IV was removed upon arriving in recovery as pt was restless and pulled the IV out as he woke up. No complications.

## 2020-07-30 NOTE — PROGRESS NOTES
Reviewed patient's medical and surgical history in electronic record and with patient at the bedside. All questions regarding procedure answered. Scope number and equipment verified using a two person system. Family in waiting room.     Electronically signed by Shweta Barton RN on 7/30/2020 at 11:32 AM

## 2020-07-30 NOTE — ANESTHESIA PRE PROCEDURE
Department of Anesthesiology  Preprocedure Note       Name:  Stefano Bourne   Age:  60 y.o.  :  1960                                          MRN:  [id number]         Date:  2020      Surgeon: Sherif Bone):  Sunil Sandhu MD    Procedure: Procedure(s):  COLONOSCOPY DIAGNOSTIC    Medications prior to admission:   Prior to Admission medications    Medication Sig Start Date End Date Taking? Authorizing Provider   ibuprofen (ADVIL;MOTRIN) 800 MG tablet TAKE 1 TABLET BY MOUTH EVERY 8 HOURS AS NEEDED FOR PAIN 20   Kristi Maldonado MD   carbidopa-levodopa (SINEMET)  MG per tablet Take 3 tablets by mouth 4 times daily 20   EMILIA Gutierrez CNP   diltiazem (CARDIZEM CD) 120 MG extended release capsule Take 1 capsule by mouth daily 20   Kateryna Quintana MD   amitriptyline (ELAVIL) 25 MG tablet TAKE 1 TABLET BY MOUTH EVERY NIGHT AT BEDTIME 19   Kristi Maldonado MD   metoprolol (LOPRESSOR) 100 MG tablet TAKE 1 TABLET BY MOUTH TWICE A DAY 19   Historical Provider, MD       Current medications:    No current facility-administered medications for this encounter. Allergies:  No Known Allergies    Problem List:    Patient Active Problem List   Diagnosis Code    Essential hypertension I10    Obstructive sleep apnea syndrome G47.33    Paroxysmal atrial fibrillation (HCC) I48.0    Macrocytosis without anemia D75.89    Disabling essential tremor G25.0    Parkinsonian tremor (HCC) G20    Adjustment disorder with anxious mood F43.22    Moderate episode of recurrent major depressive disorder (HCC) F33.1    Obesity (BMI 30.0-34. 9) E66.9       Past Medical History:        Diagnosis Date    Anxiety     Atrial fibrillation ([facility] [address]     Atrial flutter (HCC)     Cervical arthritis 3/30/2012    CTS (carpal tunnel syndrome) 3/13/2013    Essential tremor 2017    Hypertension     KATELIN (obstructive sleep apnea)     DOES NOT USE C-PAP    Osteoarthritis of back 2014    Osteoarthritis 06/17/2020    .0 06/17/2020    RDW 15.4 06/17/2020     06/17/2020       CMP:   Lab Results   Component Value Date     06/17/2020    K 4.9 06/17/2020     06/17/2020    CO2 27 06/17/2020    BUN 11 06/17/2020    CREATININE 0.7 06/17/2020    GFRAA >60 06/17/2020    GFRAA >60 03/19/2013    AGRATIO 2.0 06/17/2020    LABGLOM >60 06/17/2020    GLUCOSE 85 06/17/2020    PROT 7.0 06/17/2020    PROT 7.8 03/19/2013    CALCIUM 9.7 06/17/2020    BILITOT 0.7 06/17/2020    ALKPHOS 113 06/17/2020    AST 14 06/17/2020    ALT 7 06/17/2020       POC Tests: No results for input(s): POCGLU, POCNA, POCK, POCCL, POCBUN, POCHEMO, POCHCT in the last 72 hours. Coags:   Lab Results   Component Value Date    PROTIME 11.0 02/25/2016    INR 0.97 02/25/2016       HCG (If Applicable): No results found for: PREGTESTUR, PREGSERUM, HCG, HCGQUANT     ABGs: No results found for: PHART, PO2ART, EHY5GAN, XXB6COK, BEART, V1KKKWVX     Type & Screen (If Applicable):  Lab Results   Component Value Date    LABABO O 11/21/2016    [address] Positive 11/21/2016       Drug/Infectious Status (If Applicable):  No results found for: HIV, HEPCAB    COVID-19 Screening (If Applicable):   Lab Results   Component Value Date    COVID19 Not Detected 07/24/2020         Anesthesia Evaluation  Patient summary reviewed and Nursing notes reviewed   history of anesthetic complications: PONV.   Airway: Mallampati: III        Dental: normal exam         Pulmonary:normal exam    (+) sleep apnea: on noncompliant,                             Cardiovascular:  Exercise tolerance: good (>4 METS),   (+) hypertension:, valvular problems/murmurs: MR, dysrhythmias: atrial fibrillation, hyperlipidemia      ECG reviewed  Rhythm: regular    Echocardiogram reviewed               ROS comment: EF 55%     Neuro/Psych:   (+) neuromuscular disease: Parkinson's disease, depression/anxiety             GI/Hepatic/Renal: Neg GI/Hepatic/Renal ROS            Endo/Other:    (+) : arthritis:., .                 Abdominal:   (+) obese,         Vascular:                                    Anesthesia Plan      MAC     ASA 4       Induction: intravenous. Anesthetic plan and risks discussed with patient. Plan discussed with CRNA.                   Juan Diego Chua MD   7/30/2020

## 2020-08-04 LAB — FECAL BLOOD IMMUNOCHEMICAL TEST: POSITIVE

## 2020-08-18 ENCOUNTER — HOSPITAL ENCOUNTER (INPATIENT)
Age: 60
LOS: 2 days | Discharge: HOME | DRG: 310 | End: 2020-08-20
Attending: EMERGENCY MEDICINE | Admitting: HOSPITALIST
Payer: MEDICARE

## 2020-08-18 ENCOUNTER — APPOINTMENT (OUTPATIENT)
Dept: GENERAL RADIOLOGY | Age: 60
DRG: 310 | End: 2020-08-18
Payer: MEDICARE

## 2020-08-18 PROBLEM — I48.91 ATRIAL FIBRILLATION WITH RVR (HCC): Status: ACTIVE | Noted: 2020-08-18

## 2020-08-18 LAB
ANION GAP SERPL CALCULATED.3IONS-SCNC: 13 MMOL/L (ref 3–16)
APTT: 26.5 SEC (ref 24.2–36.2)
BASOPHILS ABSOLUTE: 0 K/UL (ref 0–0.2)
BASOPHILS RELATIVE PERCENT: 0.2 %
BUN BLDV-MCNC: 16 MG/DL (ref 7–20)
CALCIUM SERPL-MCNC: 9.6 MG/DL (ref 8.3–10.6)
CHLORIDE BLD-SCNC: 105 MMOL/L (ref 99–110)
CO2: 19 MMOL/L (ref 21–32)
CREAT SERPL-MCNC: 1 MG/DL (ref 0.8–1.3)
EOSINOPHILS ABSOLUTE: 0.3 K/UL (ref 0–0.6)
EOSINOPHILS RELATIVE PERCENT: 4.1 %
GFR AFRICAN AMERICAN: >60
GFR NON-AFRICAN AMERICAN: >60
GLUCOSE BLD-MCNC: 139 MG/DL (ref 70–99)
HCT VFR BLD CALC: 48.1 % (ref 40.5–52.5)
HEMOGLOBIN: 15.9 G/DL (ref 13.5–17.5)
INR BLD: 1.02 (ref 0.86–1.14)
LYMPHOCYTES ABSOLUTE: 1.9 K/UL (ref 1–5.1)
LYMPHOCYTES RELATIVE PERCENT: 26.6 %
MCH RBC QN AUTO: 32.7 PG (ref 26–34)
MCHC RBC AUTO-ENTMCNC: 33.1 G/DL (ref 31–36)
MCV RBC AUTO: 98.9 FL (ref 80–100)
MONOCYTES ABSOLUTE: 0.5 K/UL (ref 0–1.3)
MONOCYTES RELATIVE PERCENT: 7.4 %
NEUTROPHILS ABSOLUTE: 4.4 K/UL (ref 1.7–7.7)
NEUTROPHILS RELATIVE PERCENT: 61.7 %
PDW BLD-RTO: 14.9 % (ref 12.4–15.4)
PLATELET # BLD: 171 K/UL (ref 135–450)
PMV BLD AUTO: 10.5 FL (ref 5–10.5)
POTASSIUM SERPL-SCNC: 3.8 MMOL/L (ref 3.5–5.1)
PROTHROMBIN TIME: 11.8 SEC (ref 10–13.2)
RBC # BLD: 4.86 M/UL (ref 4.2–5.9)
SODIUM BLD-SCNC: 137 MMOL/L (ref 136–145)
TROPONIN: <0.01 NG/ML
WBC # BLD: 7.1 K/UL (ref 4–11)

## 2020-08-18 PROCEDURE — 71045 X-RAY EXAM CHEST 1 VIEW: CPT

## 2020-08-18 PROCEDURE — 96374 THER/PROPH/DIAG INJ IV PUSH: CPT

## 2020-08-18 PROCEDURE — 2580000003 HC RX 258: Performed by: PHYSICIAN ASSISTANT

## 2020-08-18 PROCEDURE — 99285 EMERGENCY DEPT VISIT HI MDM: CPT

## 2020-08-18 PROCEDURE — 96375 TX/PRO/DX INJ NEW DRUG ADDON: CPT

## 2020-08-18 PROCEDURE — 85610 PROTHROMBIN TIME: CPT

## 2020-08-18 PROCEDURE — 6360000002 HC RX W HCPCS: Performed by: PHYSICIAN ASSISTANT

## 2020-08-18 PROCEDURE — 2060000000 HC ICU INTERMEDIATE R&B

## 2020-08-18 PROCEDURE — 2500000003 HC RX 250 WO HCPCS: Performed by: PHYSICIAN ASSISTANT

## 2020-08-18 PROCEDURE — 80048 BASIC METABOLIC PNL TOTAL CA: CPT

## 2020-08-18 PROCEDURE — 93005 ELECTROCARDIOGRAM TRACING: CPT | Performed by: EMERGENCY MEDICINE

## 2020-08-18 PROCEDURE — 85025 COMPLETE CBC W/AUTO DIFF WBC: CPT

## 2020-08-18 PROCEDURE — 84484 ASSAY OF TROPONIN QUANT: CPT

## 2020-08-18 PROCEDURE — 85730 THROMBOPLASTIN TIME PARTIAL: CPT

## 2020-08-18 RX ORDER — HEPARIN SODIUM 10000 [USP'U]/100ML
9.3 INJECTION, SOLUTION INTRAVENOUS CONTINUOUS
Status: DISCONTINUED | OUTPATIENT
Start: 2020-08-18 | End: 2020-08-19 | Stop reason: ALTCHOICE

## 2020-08-18 RX ORDER — HEPARIN SODIUM 1000 [USP'U]/ML
4000 INJECTION, SOLUTION INTRAVENOUS; SUBCUTANEOUS PRN
Status: DISCONTINUED | OUTPATIENT
Start: 2020-08-18 | End: 2020-08-20 | Stop reason: HOSPADM

## 2020-08-18 RX ORDER — HEPARIN SODIUM 1000 [USP'U]/ML
4000 INJECTION, SOLUTION INTRAVENOUS; SUBCUTANEOUS ONCE
Status: COMPLETED | OUTPATIENT
Start: 2020-08-18 | End: 2020-08-18

## 2020-08-18 RX ORDER — HEPARIN SODIUM 1000 [USP'U]/ML
2000 INJECTION, SOLUTION INTRAVENOUS; SUBCUTANEOUS PRN
Status: DISCONTINUED | OUTPATIENT
Start: 2020-08-18 | End: 2020-08-20 | Stop reason: HOSPADM

## 2020-08-18 RX ORDER — HEPARIN SODIUM 1000 [USP'U]/ML
4000 INJECTION, SOLUTION INTRAVENOUS; SUBCUTANEOUS ONCE
Status: DISCONTINUED | OUTPATIENT
Start: 2020-08-18 | End: 2020-08-18 | Stop reason: SDUPTHER

## 2020-08-18 RX ORDER — HEPARIN SODIUM 10000 [USP'U]/100ML
12 INJECTION, SOLUTION INTRAVENOUS CONTINUOUS
Status: DISCONTINUED | OUTPATIENT
Start: 2020-08-18 | End: 2020-08-18 | Stop reason: SDUPTHER

## 2020-08-18 RX ORDER — HEPARIN SODIUM 1000 [USP'U]/ML
4000 INJECTION, SOLUTION INTRAVENOUS; SUBCUTANEOUS PRN
Status: DISCONTINUED | OUTPATIENT
Start: 2020-08-18 | End: 2020-08-18 | Stop reason: SDUPTHER

## 2020-08-18 RX ORDER — HEPARIN SODIUM 1000 [USP'U]/ML
30 INJECTION, SOLUTION INTRAVENOUS; SUBCUTANEOUS PRN
Status: DISCONTINUED | OUTPATIENT
Start: 2020-08-18 | End: 2020-08-18 | Stop reason: SDUPTHER

## 2020-08-18 RX ORDER — METOPROLOL TARTRATE 5 MG/5ML
5 INJECTION INTRAVENOUS ONCE
Status: COMPLETED | OUTPATIENT
Start: 2020-08-18 | End: 2020-08-18

## 2020-08-18 RX ADMIN — HEPARIN SODIUM 9.3 UNITS/KG/HR: 5000 INJECTION INTRAVENOUS; SUBCUTANEOUS at 22:37

## 2020-08-18 RX ADMIN — HEPARIN SODIUM 4000 UNITS: 1000 INJECTION INTRAVENOUS; SUBCUTANEOUS at 22:36

## 2020-08-18 RX ADMIN — METOPROLOL TARTRATE 5 MG: 1 INJECTION, SOLUTION INTRAVENOUS at 22:44

## 2020-08-18 ASSESSMENT — ENCOUNTER SYMPTOMS
VOMITING: 0
SHORTNESS OF BREATH: 0
DIARRHEA: 0
RHINORRHEA: 0
COUGH: 0
NAUSEA: 0
ABDOMINAL PAIN: 0
WHEEZING: 0

## 2020-08-19 PROBLEM — G20 PARKINSON'S DISEASE (HCC): Status: ACTIVE | Noted: 2020-08-19

## 2020-08-19 PROBLEM — G20.C PARKINSONIAN TREMOR: Status: RESOLVED | Noted: 2018-01-29 | Resolved: 2020-08-19

## 2020-08-19 PROBLEM — R55 SYNCOPE: Status: ACTIVE | Noted: 2020-08-19

## 2020-08-19 PROBLEM — G20 PARKINSONIAN TREMOR (HCC): Status: RESOLVED | Noted: 2018-01-29 | Resolved: 2020-08-19

## 2020-08-19 PROBLEM — G20.A1 PARKINSON'S DISEASE: Status: ACTIVE | Noted: 2020-08-19

## 2020-08-19 PROBLEM — I48.91 ATRIAL FIBRILLATION WITH RVR (HCC): Status: RESOLVED | Noted: 2020-08-18 | Resolved: 2020-08-19

## 2020-08-19 LAB
APTT: 35.1 SEC (ref 24.2–36.2)
APTT: 36.4 SEC (ref 24.2–36.2)
APTT: 40.1 SEC (ref 24.2–36.2)
APTT: 59.5 SEC (ref 24.2–36.2)
EKG ATRIAL RATE: 141 BPM
EKG DIAGNOSIS: NORMAL
EKG Q-T INTERVAL: 322 MS
EKG QRS DURATION: 86 MS
EKG QTC CALCULATION (BAZETT): 460 MS
EKG R AXIS: 11 DEGREES
EKG T AXIS: 15 DEGREES
EKG VENTRICULAR RATE: 123 BPM
HCT VFR BLD CALC: 48.4 % (ref 40.5–52.5)
HEMOGLOBIN: 16.4 G/DL (ref 13.5–17.5)
MCH RBC QN AUTO: 33.7 PG (ref 26–34)
MCHC RBC AUTO-ENTMCNC: 33.9 G/DL (ref 31–36)
MCV RBC AUTO: 99.3 FL (ref 80–100)
PDW BLD-RTO: 14.6 % (ref 12.4–15.4)
PLATELET # BLD: 137 K/UL (ref 135–450)
PMV BLD AUTO: 9.6 FL (ref 5–10.5)
RBC # BLD: 4.87 M/UL (ref 4.2–5.9)
WBC # BLD: 6.2 K/UL (ref 4–11)

## 2020-08-19 PROCEDURE — 6370000000 HC RX 637 (ALT 250 FOR IP): Performed by: NURSE PRACTITIONER

## 2020-08-19 PROCEDURE — 6370000000 HC RX 637 (ALT 250 FOR IP): Performed by: INTERNAL MEDICINE

## 2020-08-19 PROCEDURE — 6370000000 HC RX 637 (ALT 250 FOR IP): Performed by: HOSPITALIST

## 2020-08-19 PROCEDURE — 6360000002 HC RX W HCPCS: Performed by: PHYSICIAN ASSISTANT

## 2020-08-19 PROCEDURE — 36415 COLL VENOUS BLD VENIPUNCTURE: CPT

## 2020-08-19 PROCEDURE — 2580000003 HC RX 258: Performed by: HOSPITALIST

## 2020-08-19 PROCEDURE — 99223 1ST HOSP IP/OBS HIGH 75: CPT | Performed by: INTERNAL MEDICINE

## 2020-08-19 PROCEDURE — 85027 COMPLETE CBC AUTOMATED: CPT

## 2020-08-19 PROCEDURE — 2060000000 HC ICU INTERMEDIATE R&B

## 2020-08-19 PROCEDURE — 2580000003 HC RX 258: Performed by: PHYSICIAN ASSISTANT

## 2020-08-19 PROCEDURE — 85730 THROMBOPLASTIN TIME PARTIAL: CPT

## 2020-08-19 PROCEDURE — 93010 ELECTROCARDIOGRAM REPORT: CPT | Performed by: INTERNAL MEDICINE

## 2020-08-19 RX ORDER — ACETAMINOPHEN 325 MG/1
650 TABLET ORAL EVERY 6 HOURS PRN
Status: DISCONTINUED | OUTPATIENT
Start: 2020-08-19 | End: 2020-08-20 | Stop reason: HOSPADM

## 2020-08-19 RX ORDER — LANOLIN ALCOHOL/MO/W.PET/CERES
3 CREAM (GRAM) TOPICAL NIGHTLY PRN
Status: DISCONTINUED | OUTPATIENT
Start: 2020-08-19 | End: 2020-08-20 | Stop reason: HOSPADM

## 2020-08-19 RX ORDER — ONDANSETRON 2 MG/ML
4 INJECTION INTRAMUSCULAR; INTRAVENOUS EVERY 6 HOURS PRN
Status: DISCONTINUED | OUTPATIENT
Start: 2020-08-19 | End: 2020-08-20 | Stop reason: HOSPADM

## 2020-08-19 RX ORDER — SODIUM CHLORIDE 0.9 % (FLUSH) 0.9 %
10 SYRINGE (ML) INJECTION EVERY 12 HOURS SCHEDULED
Status: DISCONTINUED | OUTPATIENT
Start: 2020-08-19 | End: 2020-08-20 | Stop reason: HOSPADM

## 2020-08-19 RX ORDER — AMITRIPTYLINE HYDROCHLORIDE 25 MG/1
25 TABLET, FILM COATED ORAL NIGHTLY
Status: DISCONTINUED | OUTPATIENT
Start: 2020-08-19 | End: 2020-08-19 | Stop reason: ALTCHOICE

## 2020-08-19 RX ORDER — CETIRIZINE HYDROCHLORIDE 10 MG/1
10 TABLET ORAL DAILY
Status: DISCONTINUED | OUTPATIENT
Start: 2020-08-19 | End: 2020-08-20 | Stop reason: HOSPADM

## 2020-08-19 RX ORDER — POLYETHYLENE GLYCOL 3350 17 G/17G
17 POWDER, FOR SOLUTION ORAL DAILY PRN
Status: DISCONTINUED | OUTPATIENT
Start: 2020-08-19 | End: 2020-08-20 | Stop reason: HOSPADM

## 2020-08-19 RX ORDER — DILTIAZEM HYDROCHLORIDE 120 MG/1
120 CAPSULE, COATED, EXTENDED RELEASE ORAL DAILY
Status: DISCONTINUED | OUTPATIENT
Start: 2020-08-20 | End: 2020-08-20 | Stop reason: HOSPADM

## 2020-08-19 RX ORDER — ACETAMINOPHEN 650 MG/1
650 SUPPOSITORY RECTAL EVERY 6 HOURS PRN
Status: DISCONTINUED | OUTPATIENT
Start: 2020-08-19 | End: 2020-08-20 | Stop reason: HOSPADM

## 2020-08-19 RX ORDER — SODIUM CHLORIDE 0.9 % (FLUSH) 0.9 %
10 SYRINGE (ML) INJECTION PRN
Status: DISCONTINUED | OUTPATIENT
Start: 2020-08-19 | End: 2020-08-20 | Stop reason: HOSPADM

## 2020-08-19 RX ORDER — LANOLIN ALCOHOL/MO/W.PET/CERES
3 CREAM (GRAM) TOPICAL NIGHTLY PRN
Status: DISCONTINUED | OUTPATIENT
Start: 2020-08-19 | End: 2020-08-19

## 2020-08-19 RX ORDER — METOPROLOL TARTRATE 50 MG/1
100 TABLET, FILM COATED ORAL 2 TIMES DAILY
Status: DISCONTINUED | OUTPATIENT
Start: 2020-08-19 | End: 2020-08-20 | Stop reason: HOSPADM

## 2020-08-19 RX ADMIN — CARBIDOPA AND LEVODOPA 3 TABLET: 25; 100 TABLET ORAL at 17:00

## 2020-08-19 RX ADMIN — CARBIDOPA AND LEVODOPA 3 TABLET: 25; 100 TABLET ORAL at 21:15

## 2020-08-19 RX ADMIN — METOPROLOL TARTRATE 100 MG: 50 TABLET, FILM COATED ORAL at 01:46

## 2020-08-19 RX ADMIN — HEPARIN SODIUM 11.3 UNITS/KG/HR: 5000 INJECTION INTRAVENOUS; SUBCUTANEOUS at 07:27

## 2020-08-19 RX ADMIN — RIVAROXABAN 20 MG: 20 TABLET, FILM COATED ORAL at 13:56

## 2020-08-19 RX ADMIN — Medication 10 ML: at 21:15

## 2020-08-19 RX ADMIN — METOPROLOL TARTRATE 100 MG: 50 TABLET, FILM COATED ORAL at 21:14

## 2020-08-19 RX ADMIN — MELATONIN TAB 3 MG 3 MG: 3 TAB at 21:14

## 2020-08-19 RX ADMIN — Medication 10 ML: at 10:11

## 2020-08-19 RX ADMIN — METOPROLOL TARTRATE 100 MG: 50 TABLET, FILM COATED ORAL at 10:11

## 2020-08-19 RX ADMIN — CARBIDOPA AND LEVODOPA 3 TABLET: 25; 100 TABLET ORAL at 10:59

## 2020-08-19 RX ADMIN — HEPARIN SODIUM 2000 UNITS: 1000 INJECTION INTRAVENOUS; SUBCUTANEOUS at 07:28

## 2020-08-19 RX ADMIN — MELATONIN TAB 3 MG 3 MG: 3 TAB at 01:46

## 2020-08-19 RX ADMIN — CETIRIZINE HYDROCHLORIDE 10 MG: 10 TABLET, FILM COATED ORAL at 10:11

## 2020-08-19 ASSESSMENT — PAIN SCALES - GENERAL
PAINLEVEL_OUTOF10: 0

## 2020-08-19 NOTE — ED NOTES
Called lab spoke with LEOBARDO about runnning aptt on the blood sent, LEOBARDO confirmed that they would run it.       Anna Pedroza RN  08/18/20 6939

## 2020-08-19 NOTE — ED NOTES
Pt alert and orientated, no s/s of distress. Pt denies any needs, Afib on the monitor, denies any pain, or sob, states that his hr was in the 140's at home. Respirations easy, even, and unlabored. Family at the bedside.       Bella Ku RN  08/18/20 1702

## 2020-08-19 NOTE — ED PROVIDER NOTES
This patient was seen by the Mid-Level Provider. I have seen and examined the patient, agree with the workup, evaluation, management and diagnosis. Care plan has been discussed. My assessment reveals a 34-year-old male presents with palpitations and syncope. This is a 34-year-old male with a history of atrial fibrillation that is required several ablations in the past.  He presents today after a syncopal episode witnessed by his wife. In addition, the patient states he been having palpitations and he felt like he was in A. fib for the last week. When the patient arrived he was in atrial fibrillation with a rapid ventricular response. Radiology results:    XR CHEST PORTABLE   Final Result   No radiographic evidence of acute pulmonary disease. LABS:    Labs Reviewed   BASIC METABOLIC PANEL - Abnormal; Notable for the following components:       Result Value    CO2 19 (*)     Glucose 139 (*)     All other components within normal limits    Narrative:     Performed at:  [facility] [address], [facility], [address] [place]   Phone [phone]   TROPONIN    Narrative:     Performed at:  [facility] [address], [facility], [address] [place]   Phone [phone]   CBC WITH AUTO DIFFERENTIAL    Narrative:     Performed at:  [facility] [address], [facility], [address] [place]   Phone [phone]   PROTIME-INR    Narrative:     Performed at:  [facility] [address], [facility], [address] [place]   Phone [phone]   APTT   APTT   APTT           EKG:    Atrial fibrillation with a rapid ventricular response at a rate of 123 beats a minute with no acute ST elevations or depressions or pathologic Q waves. Normal axis. Exam:    Well-nourished male in no acute distress. Neurologically he was alert and oriented with no focal deficits.   Heart was irregular rate and rhythm with no murmurs rubs gallops. Medical decision makin-year-old male presents with atrial fibrillation with mild rapid ventricular response. He also had a witnessed syncopal episode. The patient will be admitted for further care. We have talked to cardiology who have recommended treatment as well. He was admitted in stable condition. FINAL IMPRESSION:    1. Atrial fibrillation with RVR ([address]    2. Syncope and collapse      Critical care time: 40 minutes of critical care time spent with this patient with multiple visits to the bedside.      Yamileth Yip MD  20 [phone]

## 2020-08-19 NOTE — CONSULTS
 SALIVARY GLAND SURGERY      TOE SURGERY      TOTAL HIP ARTHROPLASTY Right 2016    right hip replacement       No Known Allergies    Social History:  Reviewed. reports that he quit smoking about 24 years ago. He has a 15.00 pack-year smoking history. He has never used smokeless tobacco. He reports current alcohol use of about 20.0 standard drinks of alcohol per week. He reports previous drug use. Drug: Marijuana. Family History:  Reviewed. family history includes Alcohol Abuse in his father; High Blood Pressure in his mother; High Cholesterol in his mother; Other in an other family member. Review of System:  All other systems reviewed and are negative except for that noted above. Pertinent negatives are:     · General: negative for fever, chills   · Ophthalmic ROS: negative for - eye pain or loss of vision  · ENT ROS: negative for - headaches, sore throat   · Respiratory: negative for - cough, sputum  · Cardiovascular: Reviewed in HPI  · Gastrointestinal: negative for - abdominal pain, diarrhea, N/V  · Hematology: negative for - bleeding, blood clots, bruising or jaundice  · Genito-Urinary:  negative for - Dysuria or incontinence  · Musculoskeletal: negative for - Joint swelling, muscle pain  · Neurological: negative for - confusion, dizziness, headaches   · Psychiatric: No anxiety, no depression. · Dermatological: negative for - rash    Physical Examination:  Vitals:    20 0505   BP: (!) 149/95   Pulse: 70   Resp: 16   Temp: 98 °F (36.7 °C)   SpO2: 97%      No intake/output data recorded.    Wt Readings from Last 3 Encounters:   20 235 lb (106.6 kg)   20 235 lb (106.6 kg)   20 236 lb (107 kg)     Temp  Av.7 °F (36.5 °C)  Min: 97.2 °F (36.2 °C)  Max: 98 °F (36.7 °C)  Pulse  Av.8  Min: 70  Max: 110  BP  Min: 100/70  Max: 149/95  SpO2  Av.6 %  Min: 94 %  Max: 97 %  No intake or output data in the 24 hours ending 20 0825    · Telemetry: Atrial fibrillation · Constitutional: Oriented. No distress. · Head: Normocephalic and atraumatic. · Mouth/Throat: Oropharynx is clear and moist.   · Eyes: Conjunctivae normal. EOM are normal.   · Neck: Neck supple. No rigidity. No JVD present. · Cardiovascular: Normal rate, irregular rhythm, S1&S2. · Pulmonary/Chest: Bilateral respiratory sounds. No wheezes, No rhonchi. · Abdominal: Soft. Bowel sounds present. No distension, No tenderness. · Musculoskeletal: No tenderness. No edema    · Lymphadenopathy: Has no cervical adenopathy. · Neurological: Alert and oriented. Cranial nerve appears intact, No Gross deficit   · Skin: Skin is warm and dry. No rash noted. · Psychiatric: Has a normal behavior     Labs, diagnostic and imaging results reviewed. Reviewed. Recent Labs     08/18/20 2104      K 3.8      CO2 19*   BUN 16   CREATININE 1.0     Recent Labs     08/18/20  2103 08/19/20  0430   WBC 7.1 6.2   HGB 15.9 16.4   HCT 48.1 48.4   MCV 98.9 99.3    137     Lab Results   Component Value Date    TROPONINI <0.01 08/18/2020     No results found for: BNP  Lab Results   Component Value Date    PROTIME 11.8 08/18/2020    PROTIME 11.0 02/25/2016    INR 1.02 08/18/2020    INR 0.97 02/25/2016     Lab Results   Component Value Date    CHOL 160 06/17/2020    HDL 41 06/17/2020    HDL 42 11/16/2011    TRIG 166 06/17/2020       ECG: Atrial fibrillation with rapid ventricular responseSeptal infarct   Echo: 2016  Conclusions      Summary   Normal left ventricle size, wall thickness and systolic function with an   estimated ejection fraction of 55%. Mild mitral regurgitation is present. There is no evidence of mass or thrombus in the left atrium or appendage. The aortic root is mildly dilated.   Cath:     Scheduled Meds:   [START ON 8/20/2020] carbidopa-levodopa  3 tablet Oral 4x Daily    cetirizine  10 mg Oral Daily    metoprolol tartrate  100 mg Oral BID    [START ON 8/20/2020] dilTIAZem  120 mg Oral Daily    sodium chloride flush  10 mL Intravenous 2 times per day     Continuous Infusions:   heparin (porcine) 11.3 Units/kg/hr (08/19/20 0727)     PRN Meds:.sodium chloride flush, acetaminophen **OR** acetaminophen, polyethylene glycol, ondansetron, melatonin, heparin (porcine), heparin (porcine)     Patient Active Problem List    Diagnosis Date Noted    Parkinson's disease ([facility] 08/19/2020    Syncope 08/19/2020    [facility] fibrillation [facility] 08/18/2020    Obesity (BMI 30.0-34.9) 02/05/2020    Moderate episode of recurrent major depressive disorder ([facility] 09/11/2018    Adjustment disorder with anxious mood 09/05/2018    Parkinsonian tremor ([facility] 01/29/2018    Disabling essential tremor 12/01/2017    Macrocytosis without anemia 08/18/2017    Paroxysmal atrial fibrillation ([facility] 02/25/2016    Obstructive sleep apnea syndrome 12/19/2014    Essential hypertension 11/16/2011      Active Hospital Problems    Diagnosis Date Noted    Parkinson's disease ([facility] Adis Soto 08/19/2020    Syncope [R55] 08/19/2020    Atrial fibrillation with RVR ([facility] [I48.91] 08/18/2020    Essential hypertension [I10] 11/16/2011       Assessment:       Plan:    - paroxysmal atrial fibrillation   Off xarelto due to risk of fall and no recurrence   His [id number] score is 1 (HTN)   Will resume xarelto for now and do transesophageal echocardiogram(MASSIMO) and cardioversion if he does not cardiovert . Will f/u with Dr. Armando Ellington for consideration of ablation and also Watchman. Given his advancing Parkinson disease and risk of injury and fall, ALFONSO closure is reasonable. However, [id number] score is below cut off now. - Syncope     Likely due to dehydration and Atrial fibrillation      - HTN   BP is well controlled. Continue current meds. - KATELIN     CPAP    - Parkinson   On meds and controlled    - Obesity   Excessive weight is complicating assessment and treatment.  It is placing patient at risk for multiple co-morbidities as well as early death and contributing to the patient's presentation. - discussed weight management with diet and exercise            Thank you for allowing me to participate in the care of Ashish Harmonarez [id number]     NOTE: This report was transcribed using voice recognition software. Every effort was made to ensure accuracy, however, inadvertent computerized transcription errors may be present.

## 2020-08-19 NOTE — ED PROVIDER NOTES
cough, shortness of breath and wheezing. Cardiovascular: Positive for palpitations. Negative for chest pain. Gastrointestinal: Negative for abdominal pain, diarrhea, nausea and vomiting. Genitourinary: Negative for difficulty urinating, dysuria and hematuria. Musculoskeletal: Negative for neck pain and neck stiffness. Skin: Negative for rash. Neurological: Positive for syncope and light-headedness. Negative for dizziness, weakness and headaches. Positives and Pertinent negatives as per HPI. Except as noted above in the ROS, all other systems were reviewed and negative.        PAST MEDICAL HISTORY     Past Medical History:   Diagnosis Date    Anxiety     Atrial fibrillation ([address]     Atrial flutter ([address]     Cervical arthritis 3/30/2012    CTS (carpal tunnel syndrome) 3/13/2013    Essential tremor 2017    Hypertension     KATELIN (obstructive sleep apnea)     DOES NOT USE C-PAP    Osteoarthritis of back 11/6/2014    Osteoarthritis of right hip 11/6/2014    [facility]     S/P ablation of atrial fibrillation 2/25/2016    Radiofrequency ablation of atrial fibrillation and pulmonary veins isolation Additional ablation of complex atrial fractionated electrogram     Sinusitis     Trigger finger 12/10/2014         SURGICAL HISTORY     Past Surgical History:   Procedure Laterality Date    ANKLE SURGERY  2015    ANKLE SURGERY Left     ATRIAL ABLATION SURGERY  1998    ATRIAL ABLATION SURGERY  2/25/16    RFCA of AF and PVI, additional ablation complex atrial fractioned electrogram    BACK SURGERY  1984    lumbar patino    CARDIOVERSION  4/6/16    CARPAL TUNNEL RELEASE Right 11/19/2013    RIGHT CARPAL TUNNEL RELEASE      CARPAL TUNNEL RELEASE Left 12-10-13     CARPAL TUNNEL RELEASE                COLONOSCOPY  10/29/2007    COLONOSCOPY N/A 7/30/2020    COLONOSCOPY POLYPECTOMY SNARE/COLD BIOPSY performed by Reva Easley MD at [facility] SALIVARY GLAND SURGERY      TOE SURGERY      TOTAL HIP ARTHROPLASTY Right 2016    right hip replacement         CURRENTMEDICATIONS       Previous Medications    AMITRIPTYLINE (ELAVIL) 25 MG TABLET    TAKE 1 TABLET BY MOUTH EVERY NIGHT AT BEDTIME    CARBIDOPA-LEVODOPA (SINEMET)  MG PER TABLET    Take 3 tablets by mouth 4 times daily    DILTIAZEM (CARDIZEM CD) 120 MG EXTENDED RELEASE CAPSULE    Take 1 capsule by mouth daily    FEXOFENADINE (ALLEGRA) 180 MG TABLET    Take 180 mg by mouth daily    IBUPROFEN (ADVIL;MOTRIN) 800 MG TABLET    TAKE 1 TABLET BY MOUTH EVERY 8 HOURS AS NEEDED FOR PAIN    METOPROLOL (LOPRESSOR) 100 MG TABLET    TAKE 1 TABLET BY MOUTH TWICE A DAY    NONFORMULARY    Medication for a-fib does not remember the name    SUVOREXANT (BELSOMRA) 10 MG TABS    Take by mouth. ALLERGIES     Patient has no known allergies. FAMILYHISTORY       Family History   Problem Relation Age of Onset    High Blood Pressure Mother     High Cholesterol Mother     Alcohol Abuse Father     Other Other         afib    Heart Disease Neg Hx     Mental Illness Neg Hx           SOCIAL HISTORY       Social History     Tobacco Use    Smoking status: Former Smoker     Packs/day: 1.00     Years: 15.00     Pack years: 15.00     Last attempt to quit: 1996     Years since quittin.2    Smokeless tobacco: Never Used   Substance Use Topics    Alcohol use: Yes     Alcohol/week: 20.0 standard drinks     Types: 20 Standard drinks or equivalent per week     Comment: weekends     Drug use: Not Currently     Types: Marijuana     Comment: occ       SCREENINGS             PHYSICAL EXAM    (up to 7 for level 4, 8 or more for level 5)     ED Triage Vitals [20]   BP Temp Temp Source Pulse Resp SpO2 Height Weight   123/70 97.2 °F (36.2 °C) Temporal 77 16 97 % -- --       Physical Exam  Vitals signs and nursing note reviewed. Constitutional:       General: He is not in acute distress.      Appearance: He is well-developed. He is not ill-appearing, toxic-appearing or diaphoretic. HENT:      Head: Normocephalic and atraumatic. Right Ear: External ear normal.      Left Ear: External ear normal.      Nose: Nose normal.   Eyes:      General:         Right eye: No discharge. Left eye: No discharge. Neck:      Musculoskeletal: Normal range of motion and neck supple. Cardiovascular:      Rate and Rhythm: Tachycardia present. Rhythm irregular. Heart sounds: Normal heart sounds. Pulmonary:      Effort: Pulmonary effort is normal. No respiratory distress. Breath sounds: Normal breath sounds. No wheezing, rhonchi or rales. Chest:      Chest wall: No tenderness. Abdominal:      General: Bowel sounds are normal. There is no distension. Palpations: Abdomen is soft. Tenderness: There is no abdominal tenderness. Musculoskeletal: Normal range of motion. Skin:     General: Skin is warm and dry. Neurological:      Mental Status: He is alert and oriented to person, place, and time.    Psychiatric:         Behavior: Behavior normal.         DIAGNOSTIC RESULTS   LABS:    Labs Reviewed   BASIC METABOLIC PANEL - Abnormal; Notable for the following components:       Result Value    CO2 19 (*)     Glucose 139 (*)     All other components within normal limits    Narrative:     Performed at:  [facility] [address] [place]   Phone [phone]   TROPONIN    Narrative:     Performed at:  [facility] [address] [facility] [place] [place]   Phone [phone]   CBC WITH AUTO DIFFERENTIAL    Narrative:     Performed at:  [facility] [address] [facility] [employer]   Phone [phone]   PROTIME-INR    Narrative:     Performed at:  [facility] [address] [employer]   Phone [phone]   APTT   APTT       All other labs 123 bpm.     CBC and BMP are unremarkable. Troponin is negative. Coags are within normal limits. Portable chest x-ray shows no radiographic evidence of acute pulmonary disease. I spoke with the on-call cardiology nurse practitioner who recommends dose of Lopressor for mild RVR and initiation of heparin due to atrial fibrillation without anticoagulation. He will be admitted for further evaluation management of this as well as syncope. Hospitalist will resume care the patient at this time. Patient was informed and is agreeable. He is stable for admission. FINAL IMPRESSION      1. Atrial fibrillation with RVR ([address]    2. Syncope and collapse          DISPOSITION/PLAN   DISPOSITION        PATIENT REFERREDTO:  No follow-up provider specified.     DISCHARGE MEDICATIONS:  New Prescriptions    No medications on file       DISCONTINUED MEDICATIONS:  Discontinued Medications    No medications on file              (Please note that portions of this note were completed with a voice recognition program.  Efforts were made to edit the dictations but occasionally words are mis-transcribed.)    Duane Dickens PA-C (electronically signed)           Danny Zeng PA-C  08/18/20 1638

## 2020-08-19 NOTE — H&P
Hospital Medicine History & Physical      PCP: Sheral Eisenmenger, MD    Date of Admission: 8/18/2020    Date of Service: Pt seen/examined on 8/18/20 2345  and Admitted to Inpatient with expected LOS greater than two midnights due to syncope, palpatations. Recurrent afib RVR    Chief Complaint:  Passed out, has noted he has been back in afib       History Of Present Illness:   63 y.o. male PMH atrial fibrillation s/p 2 ablations 1990's and again about 4 years ago. Sees Dr Rancho Goldstein cardiology and has an appointment to see him tomorrow. Tonight they ate supper at 1830 pm went out back to sit for awhile. When he got up to go into kitchen he felt lightheaded and syncopized with LOC falling forward. Reports samall area tenderness behind left ear. Was in atrial fibrillation in ER HR 80's-110's . Denies CP. He is not on anticoagulation. Previously took xarelto but was dc'd bc he had been in NSR for 3-4 years. Also has Parkinson's dz on sinemet. About 2 weeks ago started selegiline to control midday movement issues took one tab a day then doubled it after this he noted is when he noticed the recurrence of the atrial fibrillation so this has been discontinued. BMP normal, trop <0.01, TSH in June 2.03. CBC normal Covid neg 7/24/20. cxr neg . EKG on arrival ER afib . He thinks has had echo within last year but per our computer 2016 .  ER discussed with cardiology     Past Medical History:          Diagnosis Date    Anxiety     Atrial fibrillation ([address]     Atrial flutter ([address]     Cervical arthritis 3/30/2012    CTS (carpal tunnel syndrome) 3/13/2013    Essential tremor 2017    Hypertension     KATELIN (obstructive sleep apnea)     DOES NOT USE C-PAP    Osteoarthritis of back 11/6/2014    Osteoarthritis of right hip 11/6/2014    Parkinson disease ([address]     S/P ablation of atrial fibrillation 2/25/2016    Radiofrequency ablation of atrial fibrillation and pulmonary veins isolation Additional ablation of complex atrial fractionated electrogram     Sinusitis     Trigger finger 12/10/2014       Past Surgical History:          Procedure Laterality Date    ANKLE SURGERY  2015    ANKLE SURGERY Left     ATRIAL ABLATION SURGERY  1998    ATRIAL ABLATION SURGERY  2/25/16    RFCA of AF and PVI, additional ablation complex atrial fractioned electrogram    BACK SURGERY  1984    lumbar patino    CARDIOVERSION  4/6/16    CARPAL TUNNEL RELEASE Right 11/19/2013    RIGHT CARPAL TUNNEL RELEASE      CARPAL TUNNEL RELEASE Left 12-10-13     CARPAL TUNNEL RELEASE                COLONOSCOPY  10/29/2007    COLONOSCOPY N/A 7/30/2020    COLONOSCOPY POLYPECTOMY SNARE/COLD BIOPSY performed by Randi Redd MD at [facility]    SALIVARY GLAND SURGERY      TOE SURGERY      TOTAL HIP ARTHROPLASTY Right 2016    right hip replacement       Medications Prior to Admission:      Prior to Admission medications    Medication Sig Start Date End Date Taking? Authorizing Provider   fexofenadine (ALLEGRA) 180 MG tablet Take 180 mg by mouth daily   Yes Historical Provider, MD   ibuprofen (ADVIL;MOTRIN) 800 MG tablet TAKE 1 TABLET BY MOUTH EVERY 8 HOURS AS NEEDED FOR PAIN 6/30/20  Yes Lian Iglesias MD   carbidopa-levodopa (SINEMET)  MG per tablet Take 3 tablets by mouth 4 times daily 2/6/20  Yes EMILIA Meade - CNP   diltiazem (CARDIZEM CD) 120 MG extended release capsule Take 1 capsule by mouth daily 2/5/20  Yes Zack Ware MD   metoprolol (LOPRESSOR) 100 MG tablet TAKE 1 TABLET BY MOUTH TWICE A DAY 6/16/19  Yes Historical Provider, MD       Allergies:  Patient has no known allergies. Social History:      The patient currently lives with wife    TOBACCO:   reports that he quit smoking about 24 years ago. He has a 15.00 pack-year smoking history. He has never used smokeless tobacco.  ETOH:   reports current alcohol use of about 20.0 standard drinks of alcohol per week.   E-Cigarettes Vaping or Juuling     Questions Responses    Vaping Use     Start Date     Does device contain nicotine? Quit Date     Vaping Type             Family History:    Positive as follows:        Problem Relation Age of Onset    High Blood Pressure Mother     High Cholesterol Mother     Alcohol Abuse Father     Other Other         afib    Heart Disease Neg Hx     Mental Illness Neg Hx        REVIEW OF SYSTEMS:   Pertinent positives as noted in the HPI. All other systems reviewed and negative. PHYSICAL EXAM PERFORMED:    /81   Pulse 84   Temp 98 °F (36.7 °C) (Oral)   Resp 16   Ht 6' (1.829 m)   Wt 235 lb (106.6 kg)   SpO2 97%   BMI 31.87 kg/m²     General appearance:  No apparent distress, appears stated age and cooperative. tanned   HEENT:  Normal cephalic,without obvious deformity. Pupils equal, round, and reactive to light. Extra ocular muscles intact. anicteric glasses Conjunctivae/corneas clear. Neck: Supple, with full range of motion. No jugular venous distention. Trachea midline. Respiratory:  Normal  effort. Clear to auscultation,  Cardiovascular: S1/S2 without murmurs, rubs or gallops. iregularly irregular   Abdomen: Soft, non-tender, non-distended with normal bowel sounds. Musculoskeletal:  No clubbing, cyanosis or edema bilaterally. Full range of motion without deformity. Skin: Skin color, texture, turgor normal.  No rashes or lesions. Neurologic:  Neurovascularly intact without any focal sensory/motor deficits.  Cranial nerves: II-XII intact, grossly non-focal.  Psychiatric:  Alert and oriented, thought content appropriate, normal insight  Capillary Refill: Brisk,< 3 seconds   Peripheral Pulses: +2 palpable, equal bilaterally       Labs:     Recent Labs     08/18/20  2103   WBC 7.1   HGB 15.9   HCT 48.1        Recent Labs     08/18/20  2104      K 3.8      CO2 19*   BUN 16   CREATININE 1.0   CALCIUM 9.6     No results for input(s): AST, ALT, BILIDIR, BILITOT, ALKPHOS in the last 72 hours. Recent Labs     08/18/20  2103   INR 1.02     Recent Labs     08/18/20 2104   TROPONINI <0.01       Urinalysis:      Lab Results   Component Value Date    NITRU Negative 11/21/2016    SPECGRAV 1.010 11/21/2016    GLUCOSEU Negative 11/21/2016       Radiology:     EKG afib RVR  XR CHEST PORTABLE   Final Result   No radiographic evidence of acute pulmonary disease. ASSESSMENT:    Active Hospital Problems    Diagnosis Date Noted    Parkinson's disease ([facility] Daylenbelen Tollivero 08/19/2020    Syncope [R55] 08/19/2020    Atrial fibrillation with RVR ([facility] [I48.91] 08/18/2020    Essential hypertension [I10] 11/16/2011         PLAN:  Atrial fibrillation RVR:  Admit tele. Hx ablation x2  -Increase his cardizem CD from 120mg daily to 180mg daily if BP will tolerate  -continue metoprolol [id number] BID  -hs been off anticoagulation ER gave heparin drip. Can discuss further with cardiology and pt in am regarding NEELAM on dc  --cardiology to see in am     Syncope  --nonfocal could consider check carotids but suspect more cardiac etiology, nonfocal  --    parkinsons dz  --continue his sinemet  --selegiline can cause arrhythymias. He has already dc'd this. Essential HTN  --continue metoprolol 100mg po BID. Will try increase cardizem CD from 120mg daily to 180mg daily and see if BP can tolerate for better rate control          DVT Prophylaxis:on heparin drip  Diet: cardiac   Code Status: full    Dispo - 85 minutes time admit inpatient status to telemetry    Kassandra Gabriel MD    Thank you Argenis Stovall MD for the opportunity to be involved in this patient's care. If you have any questions or concerns please feel free to contact me at [phone].

## 2020-08-19 NOTE — FLOWSHEET NOTE
MD paged: \"Pharmacist called and asked about the Lovenox 105mg and if you still wanted it ordered with the pt being on the Heparin drip. The Heparin drip is going 991.38 units/kg/hr. Pt is fairly active and independent. Pt is no longer RVR only in atrial fib HR between 80s-90s. Last /81. Pt resting easy and in no pain. Do you still want to give him the 105 Lovenox 2x daily? \"

## 2020-08-19 NOTE — PROGRESS NOTES
[facility]    8/19/2020 7:27 AM        Name: Christopher Servin . Admitted: 8/18/2020  Primary Care Provider: Joaquín Foley MD (Tel: [phone])      Subjective:  . Seen with am with significant other at the bedside  Pt in good spirits  Feels better  Remains in Atrial flutter, rate on 80     On heparin drip     Reviewed interval ancillary notes    Current Medications  [START ON 8/20/2020] carbidopa-levodopa (SINEMET)  MG per tablet 3 tablet, 4x Daily  cetirizine (ZYRTEC) tablet 10 mg, Daily  metoprolol tartrate (LOPRESSOR) tablet 100 mg, BID  [START ON 8/20/2020] dilTIAZem (CARDIZEM CD) extended release capsule 120 mg, Daily  sodium chloride flush 0.9 % injection 10 mL, 2 times per day  sodium chloride flush 0.9 % injection 10 mL, PRN  acetaminophen (TYLENOL) tablet 650 mg, Q6H PRN    Or  acetaminophen (TYLENOL) suppository 650 mg, Q6H PRN  polyethylene glycol (GLYCOLAX) packet 17 g, Daily PRN  ondansetron (ZOFRAN) injection 4 mg, Q6H PRN  melatonin tablet 3 mg, Nightly PRN  heparin (porcine) injection 4,000 Units, PRN  heparin (porcine) injection 2,000 Units, PRN  heparin 25,000 units in dextrose 5% 250 mL infusion, Continuous        Objective:  BP (!) 149/95   Pulse 70   Temp 98 °F (36.7 °C) (Oral)   Resp 16   Ht 6' (1.829 m)   Wt 235 lb (106.6 kg)   SpO2 97%   BMI 31.87 kg/m²   No intake or output data in the 24 hours ending 08/19/20 0727   Wt Readings from Last 3 Encounters:   08/18/20 235 lb (106.6 kg)   07/30/20 235 lb (106.6 kg)   06/17/20 236 lb (107 kg)       General appearance:  Appears comfortable  Eyes: Sclera clear. Pupils equal.  ENT: Moist oral mucosa. Trachea midline, no adenopathy. Cardiovascular: Regular rhythm, normal S1, S2. No murmur. No edema in lower extremities  Respiratory: Not using accessory muscles. Good inspiratory effort.  Clear to auscultation bilaterally,

## 2020-08-19 NOTE — PROGRESS NOTES
Pt to unit and oriented to room and introduced self to patient. Patient assessed per [facility]

## 2020-08-20 VITALS
BODY MASS INDEX: 31.36 KG/M2 | OXYGEN SATURATION: 95 % | TEMPERATURE: 97.7 F | WEIGHT: 231.5 LBS | SYSTOLIC BLOOD PRESSURE: 145 MMHG | HEIGHT: 72 IN | HEART RATE: 76 BPM | DIASTOLIC BLOOD PRESSURE: 70 MMHG | RESPIRATION RATE: 16 BRPM

## 2020-08-20 LAB
ANION GAP SERPL CALCULATED.3IONS-SCNC: 9 MMOL/L (ref 3–16)
APTT: 37 SEC (ref 24.2–36.2)
BUN BLDV-MCNC: 12 MG/DL (ref 7–20)
CALCIUM SERPL-MCNC: 9.4 MG/DL (ref 8.3–10.6)
CHLORIDE BLD-SCNC: 101 MMOL/L (ref 99–110)
CO2: 26 MMOL/L (ref 21–32)
CREAT SERPL-MCNC: 0.8 MG/DL (ref 0.8–1.3)
EKG ATRIAL RATE: 326 BPM
EKG ATRIAL RATE: 74 BPM
EKG DIAGNOSIS: NORMAL
EKG DIAGNOSIS: NORMAL
EKG P AXIS: -35 DEGREES
EKG P AXIS: 62 DEGREES
EKG P-R INTERVAL: 190 MS
EKG Q-T INTERVAL: 370 MS
EKG Q-T INTERVAL: 400 MS
EKG QRS DURATION: 88 MS
EKG QRS DURATION: 90 MS
EKG QTC CALCULATION (BAZETT): 402 MS
EKG QTC CALCULATION (BAZETT): 444 MS
EKG R AXIS: 4 DEGREES
EKG R AXIS: 6 DEGREES
EKG T AXIS: 4 DEGREES
EKG T AXIS: 45 DEGREES
EKG VENTRICULAR RATE: 71 BPM
EKG VENTRICULAR RATE: 74 BPM
GFR AFRICAN AMERICAN: >60
GFR NON-AFRICAN AMERICAN: >60
GLUCOSE BLD-MCNC: 97 MG/DL (ref 70–99)
HCT VFR BLD CALC: 47.7 % (ref 40.5–52.5)
HEMOGLOBIN: 16 G/DL (ref 13.5–17.5)
MCH RBC QN AUTO: 32.9 PG (ref 26–34)
MCHC RBC AUTO-ENTMCNC: 33.6 G/DL (ref 31–36)
MCV RBC AUTO: 98.1 FL (ref 80–100)
PDW BLD-RTO: 14.5 % (ref 12.4–15.4)
PLATELET # BLD: 153 K/UL (ref 135–450)
PMV BLD AUTO: 9.9 FL (ref 5–10.5)
POTASSIUM REFLEX MAGNESIUM: 4.5 MMOL/L (ref 3.5–5.1)
RBC # BLD: 4.87 M/UL (ref 4.2–5.9)
SODIUM BLD-SCNC: 136 MMOL/L (ref 136–145)
TROPONIN: <0.01 NG/ML
WBC # BLD: 5.5 K/UL (ref 4–11)

## 2020-08-20 PROCEDURE — 92960 CARDIOVERSION ELECTRIC EXT: CPT | Performed by: INTERNAL MEDICINE

## 2020-08-20 PROCEDURE — 93005 ELECTROCARDIOGRAM TRACING: CPT | Performed by: INTERNAL MEDICINE

## 2020-08-20 PROCEDURE — B24BZZ4 ULTRASONOGRAPHY OF HEART WITH AORTA, TRANSESOPHAGEAL: ICD-10-PCS | Performed by: INTERNAL MEDICINE

## 2020-08-20 PROCEDURE — 93312 ECHO TRANSESOPHAGEAL: CPT

## 2020-08-20 PROCEDURE — 80048 BASIC METABOLIC PNL TOTAL CA: CPT

## 2020-08-20 PROCEDURE — 85730 THROMBOPLASTIN TIME PARTIAL: CPT

## 2020-08-20 PROCEDURE — 36415 COLL VENOUS BLD VENIPUNCTURE: CPT

## 2020-08-20 PROCEDURE — 5A2204Z RESTORATION OF CARDIAC RHYTHM, SINGLE: ICD-10-PCS | Performed by: INTERNAL MEDICINE

## 2020-08-20 PROCEDURE — 6370000000 HC RX 637 (ALT 250 FOR IP): Performed by: NURSE PRACTITIONER

## 2020-08-20 PROCEDURE — 93325 DOPPLER ECHO COLOR FLOW MAPG: CPT

## 2020-08-20 PROCEDURE — 85027 COMPLETE CBC AUTOMATED: CPT

## 2020-08-20 PROCEDURE — 93010 ELECTROCARDIOGRAM REPORT: CPT | Performed by: INTERNAL MEDICINE

## 2020-08-20 PROCEDURE — 6370000000 HC RX 637 (ALT 250 FOR IP): Performed by: HOSPITALIST

## 2020-08-20 PROCEDURE — 99152 MOD SED SAME PHYS/QHP 5/>YRS: CPT | Performed by: INTERNAL MEDICINE

## 2020-08-20 PROCEDURE — 93320 DOPPLER ECHO COMPLETE: CPT

## 2020-08-20 PROCEDURE — 99233 SBSQ HOSP IP/OBS HIGH 50: CPT | Performed by: INTERNAL MEDICINE

## 2020-08-20 PROCEDURE — 7100000010 HC PHASE II RECOVERY - FIRST 15 MIN

## 2020-08-20 PROCEDURE — 2500000003 HC RX 250 WO HCPCS

## 2020-08-20 PROCEDURE — 99152 MOD SED SAME PHYS/QHP 5/>YRS: CPT

## 2020-08-20 PROCEDURE — 84484 ASSAY OF TROPONIN QUANT: CPT

## 2020-08-20 PROCEDURE — 92960 CARDIOVERSION ELECTRIC EXT: CPT

## 2020-08-20 RX ADMIN — DILTIAZEM HYDROCHLORIDE 120 MG: 120 CAPSULE, COATED, EXTENDED RELEASE ORAL at 09:05

## 2020-08-20 RX ADMIN — METOPROLOL TARTRATE 100 MG: 50 TABLET, FILM COATED ORAL at 09:05

## 2020-08-20 RX ADMIN — CETIRIZINE HYDROCHLORIDE 10 MG: 10 TABLET, FILM COATED ORAL at 09:05

## 2020-08-20 RX ADMIN — CARBIDOPA AND LEVODOPA 3 TABLET: 25; 100 TABLET ORAL at 09:06

## 2020-08-20 ASSESSMENT — PAIN SCALES - GENERAL: PAINLEVEL_OUTOF10: 0

## 2020-08-20 NOTE — PLAN OF CARE
Problem: Falls - Risk of:  Goal: Will remain free from falls  Description: Will remain free from falls  Outcome: Ongoing  Goal: Absence of physical injury  Description: Absence of physical injury  Outcome: Ongoing     Problem: Cardiovascular  Goal: Hemodynamic stability  Outcome: Ongoing     Problem: Respiratory  Goal: O2 Sat > 90%  Outcome: Ongoing     Problem: Pain Control  Goal: Maintain pain level at or below patient's acceptable level (or 5 if patient is unable to determine acceptable level)  Outcome: Ongoing     Vitals:    08/20/20 0100   BP: 118/76   Pulse: 71   Resp: 16   Temp: 98.3 °F (36.8 °C)   SpO2: 94%     Pt is a medium fall risk. Pt remains free from falls, throughout night. VSS. Pt denies pain. Door open. Pt encouraged to use call light for needs throughout night; call light is within reach. Bed lock is in lowest position. See all flowsheets. Will continue to monitor throughout night.

## 2020-08-20 NOTE — PROCEDURES
[address]     Electrophysiology Procedure Note       Date of Procedure: 8/20/2020  Patient's Name: Shakir Lindsey  YOB: 1960   Medical Record Number: [id number]  Procedure Performed by: Michelet Flor MD    Procedures performed:  IV sedation. Trans-esophageal echocardiography  External Electrical cardioversion   Mallampati3  ASA 3    Indication of the procedure: Persistent atrial fibrillation      Details of procedure: The patient was brought to the cath lab area in a fasting and non-sedated state. The risks, benefits and alternatives of the procedure were discussed with the patient. The patient opted to proceed with the procedure. Written informed consent was signed and placed in the chart. A timeout protocol was completed to identify the patient and the procedure being performed. An independent trained observer pushed medications at my direction. We monitored the patient's level of consciousness and vital signs/physiologic status throughout the procedure duration (see start and stop times below). Sedation:  4 mg Versed, 200 mcg Fentanyl  Sedation start: 0730  Sedation stop: 0755   Brevital 50 mg by me    IV sedation was provided with IV Versed, Fentanyl initially and MASSIMO was performed which did not show any ALFONSO/LA clot/thrombus. Full MASSIMO reports will be dictated. Patient is on chronic anticoagulation therapy. Then we used Brevital for sedation and electrical DC cardioversion was perfomred using 200J, synchronized shock. Patient was converted to sinus rhythm at 70 bpm. The patient tolerated the procedure well and there were no complications. Conclusion:   Successful external DC cardioversion of atrial fibrillation . Plan:   The patient can be discharged if remains stable. Will continue with medical therapy.

## 2020-08-20 NOTE — PROGRESS NOTES
[address]   Electrophysiology Progress Note     Admit Date: 2020     Reason for follow up: Atrial fibrillation     HPI and Interval History:   Patient seen and examined. Clinical notes reviewed. Telemetry reviewed. No new complaint today. No major events overnight. Denies having chest pain, shortness of breath, dyspnea on exertion, Orthopnea, PND at the time of this visit. Review of System:  All other systems reviewed and are negative except for that noted above. Pertinent negatives are:     · General: negative for fever, chills   · Ophthalmic ROS: negative for - eye pain or loss of vision  · ENT ROS: negative for - headaches, sore throat   · Respiratory: negative for - cough, sputum  · Cardiovascular: Reviewed in HPI  · Gastrointestinal: negative for - abdominal pain, diarrhea, N/V  · Hematology: negative for - bleeding, blood clots, bruising or jaundice  · Genito-Urinary:  negative for - Dysuria or incontinence  · Musculoskeletal: negative for - Joint swelling, muscle pain  · Neurological: negative for - confusion, dizziness, headaches   · Psychiatric: No anxiety, no depression. · Dermatological: negative for - rash      Physical Examination:  Vitals:    20 0402   BP: 138/69   Pulse: 99   Resp: 16   Temp: 98.3 °F (36.8 °C)   SpO2:       No intake/output data recorded. Wt Readings from Last 3 Encounters:   20 231 lb 8 oz (105 kg)   20 235 lb (106.6 kg)   20 236 lb (107 kg)     Temp  Av.8 °F (36.6 °C)  Min: 96.2 °F (35.7 °C)  Max: 98.3 °F (36.8 °C)  Pulse  Av.5  Min: 60  Max: 99  BP  Min: 118/76  Max: 144/79  SpO2  Av.8 %  Min: 94 %  Max: 97 %    Intake/Output Summary (Last 24 hours) at 2020 0741  Last data filed at 2020 1345  Gross per 24 hour   Intake 240 ml   Output --   Net 240 ml       · Telemetry:Atrial fibrillation   · Constitutional: Oriented. No distress. · Head: Normocephalic and atraumatic.    · Mouth/Throat: Oropharynx is collapse 08/19/2020    Obesity (BMI 30.0-34.9) 02/05/2020    Moderate episode of recurrent major depressive disorder ([facility] 09/11/2018    Adjustment disorder with anxious mood 09/05/2018    Disabling essential tremor 12/01/2017    Macrocytosis without anemia 08/18/2017    PAF (paroxysmal atrial fibrillation) ([facility] 02/25/2016    KATELIN (obstructive sleep apnea) 12/19/2014    Essential hypertension 11/16/2011      Active Hospital Problems    Diagnosis Date Noted    Parkinson's disease ([facility] Jalil Walsh 08/19/2020    Syncope and collapse [R55] 08/19/2020    PAF (paroxysmal [facility] fibrillation) ([facility] [I48.0] 02/25/2016    KATELIN (obstructive sleep apnea) [G47.33] 12/19/2014    Essential hypertension [I10] 11/16/2011       Assessment:       Plan:        - paroxysmal atrial fibrillation              Off xarelto due to risk of fall and no recurrence              His [id number] score is 1 (HTN)               xarelto     transesophageal echocardiogram(MASSIMO) and cardioversion today                   Will f/u with Dr. Akil Vieira for consideration of ablation and also Watchman. Given his advancing Parkinson disease and risk of injury and fall, ALFONSO closure is reasonable. However, [id number] score is below cut off now.        - Syncope                 Likely due to dehydration and Atrial fibrillation        - HTN              BP is well controlled. Continue current meds.      - KATELIN                 CPAP     - Parkinson              On meds and controlled     - Obesity              Excessive weight is complicating assessment and treatment. It is placing patient at risk for multiple co-morbidities as well as early death and contributing to the patient's presentation. - discussed weight management with diet and exercise            NOTE: This report was transcribed using voice recognition software. Every effort was made to ensure accuracy, however, inadvertent computerized transcription errors may be present.

## 2020-08-20 NOTE — CARE COORDINATION
Patient admitted with an anticipated short hospitalization length of stay. Chart reviewed and it appears that patient has minimal needs for discharge at this time. Discussed with patients nurse and requested that case management be notified if discharge needs are identified. Case management will continue to follow progress and update discharge plan as needed.      Electronically signed by MONTY Fregoso on 8/20/2020 at 9:28 AM

## 2020-08-20 NOTE — DISCHARGE SUMMARY
[facility]    Patient Demographics    Patient. Karri Rivera  Date of Birth. 1960  MRN. [id number]     Primary care provider. Darren Wall MD  (Tel: [phone])    Admit date: 2020    Discharge date 2020  Note Date: 2020     Reason for Hospitalization. Chief Complaint   Patient presents with    Irregular Heart Beat     pt states has hx of a-fib and has been \"flipping in and out of it for a few days\". yesterday had syncopal episode. Significant Findings. Principal Problem (Resolved):    Atrial fibrillation with RVR (HCC)  Active Problems:    Essential hypertension    KATELIN (obstructive sleep apnea)    PAF (paroxysmal atrial fibrillation) (HCC)    Parkinson's disease (HCC)    Syncope and collapse       Problems and results from this hospitalization that need follow up. 1. AF:  Follow up with EP to discuss possible ablation and or watchman procedure     Significant test results and incidental findings. 1.     Invasive procedures and treatments. [facility] Course. The patient was admitted through the ED with syncope due to AF with RVR. He was admitted and followed by EP. He was treated for the followin. Atrial flutter:  Rate currently controlled with meds. He was initially placed on heparin drip then transitioned to xarelto. He did have a successful cardioversion on the day of d/c. He will follow up with EP to consider watchman device so that he may eventually stop the [facility]   2. HTN:  bp in range  3. Parkinson's:  Stable on home therapy  4. Syncope due to At flutter with rapid rate, now resolved. He was feeling well at the time of d/c and was eager to be released       Consults. IP CONSULT TO CARDIOLOGY  IP CONSULT TO HOSPITALIST  IP CONSULT TO CARDIOLOGY    Physical examination on discharge day.    /69   Pulse 99   Temp 98.3 °F (36.8 °C) (Oral)   Resp 16   Ht 6' (1.829 m)   Wt 231 lb 8 oz (105 kg)   SpO2 94%   BMI 31.40 kg/m²   General appearance. Alert. Looks comfortable. Alert and pleasant   HEENT. Sclera clear. Moist mucus membranes. Cardiovascular. Regular rate and rhythm, normal S1, S2. No murmur. Respiratory. Not using accessory muscles. Clear to auscultation bilaterally, no wheeze. Gastrointestinal. Abdomen soft, non-tender, not distended, normal bowel sounds  Neurology. Facial symmetry. No speech deficits. Moving all extremities equally. Extremities. No edema in lower extremities. Skin. Warm, dry, normal turgor    Condition at time of discharge stable     Medication instructions provided to patient at discharge. Medication List      START taking these medications    rivaroxaban 15 & 20 MG Starter Pack  Take as directed on package. CHANGE how you take these medications    carbidopa-levodopa  MG per tablet  Commonly known as:  Sinemet  Take 3 tablets by mouth 4 times daily  What changed:  additional instructions        CONTINUE taking these medications    dilTIAZem 120 MG extended release capsule  Commonly known as:  CARDIZEM CD  Take 1 capsule by mouth daily     fexofenadine 180 MG tablet  Commonly known as:  ALLEGRA     metoprolol 100 MG tablet  Commonly known as:  LOPRESSOR        STOP taking these medications    ibuprofen 800 MG tablet  Commonly known as:  ADVIL;MOTRIN           Where to Get Your Medications      These medications were sent to [facility], [address] [place] [address], [address] [place] [address] Road    Phone:  [phone]   · rivaroxaban 15 & 20 MG Starter Pack         Discharge recommendations given to patient. Follow Up. in 1 week   Disposition. Home   Activity. As tolerated   Diet: DIET GENERAL;      Spent > 30  minutes in discharge process.     Signed:  EMILIA Vicente CNP     8/20/2020 8:26 AM

## 2020-08-20 NOTE — DISCHARGE SUMMARY
Pt discharged to home with spouse. Reviewed discharge paperwork with pt. IV and Telemetry monitor removed. Exited via w/c per staff.

## 2020-08-21 ENCOUNTER — TELEPHONE (OUTPATIENT)
Dept: INTERNAL MEDICINE CLINIC | Age: 60
End: 2020-08-21

## 2020-08-21 NOTE — TELEPHONE ENCOUNTER
[address] [facility] Trans[facility] Initial Follow Up Call    Outreach made within 2 business days of discharge: Yes    Patient: Stefano Bourne Patient : 1960   MRN: [id number]  Reason for Admission: There are no discharge diagnoses documented for the most recent discharge. Discharge Date: 20       Spoke with: patient    Discharge department/facility: [facility] Interactive Patient Contact:  Was patient able to fill all prescriptions: Yes  Was patient instructed to bring all medications to the follow-up visit: Yes  Is patient taking all medications as directed in the discharge summary?  Yes  Does patient understand their discharge instructions: Yes  Does patient have questions or concerns that need addressed prior to 7-14 day follow up office visit: no    Scheduled appointment with PCP within 7-14 days    Follow Up  Future Appointments   Date Time Provider Jeremías Hair   2020  9:30 AM MD THIERRY [facility]   10/6/2020 11:30 AM Kateryna Quintana MD  [facility]   10/12/2020 10:30 AM EMILIA Gutierrez - CNP  [facility]   2020 10:45 AM MD THIERRY [facility]       Elias Sadler MA

## 2020-08-24 ENCOUNTER — OFFICE VISIT (OUTPATIENT)
Dept: INTERNAL MEDICINE CLINIC | Age: 60
End: 2020-08-24
Payer: MEDICARE

## 2020-08-24 VITALS
OXYGEN SATURATION: 98 % | WEIGHT: 235 LBS | BODY MASS INDEX: 31.87 KG/M2 | TEMPERATURE: 97.3 F | HEART RATE: 58 BPM | SYSTOLIC BLOOD PRESSURE: 126 MMHG | DIASTOLIC BLOOD PRESSURE: 82 MMHG

## 2020-08-24 PROCEDURE — 99495 TRANSJ CARE MGMT MOD F2F 14D: CPT | Performed by: INTERNAL MEDICINE

## 2020-08-24 PROCEDURE — 1111F DSCHRG MED/CURRENT MED MERGE: CPT | Performed by: INTERNAL MEDICINE

## 2020-08-24 RX ORDER — SELEGILINE HYDROCHLORIDE 5 MG/1
CAPSULE ORAL
COMMUNITY
Start: 2020-07-14 | End: 2020-10-06

## 2020-08-24 NOTE — PROGRESS NOTES
Post-Discharge Transitional Care Management Services or Hospital Follow Up      Ann Melendez   YOB: 1960    Date of Office Visit:  8/24/2020  Date of Hospital Admission: 8/18/20  Date of Hospital Discharge: 8/20/20  Readmission Risk Score(high >=14%. Medium >=10%):Readmission Risk Score: 8      Care management risk score Rising risk (score 2-5) and Complex Care (Scores >=6): 4     Non face to face  following discharge, date last encounter closed (first attempt may have been earlier): 8/21/2020  2:37 PM 8/21/2020  2:37 PM    Call initiated 2 business days of discharge: Yes     Patient Active Problem List   Diagnosis    Essential hypertension    KATELIN (obstructive sleep apnea)    PAF (paroxysmal atrial fibrillation) (HCC)    Macrocytosis without anemia    Disabling essential tremor    Adjustment disorder with anxious mood    Moderate episode of recurrent major depressive disorder ([facility]    Obesity (BMI 30.0-34. 9)    Parkinson's disease ([facility]    Syncope and collapse       No Known Allergies    Medications listed as ordered at the time of discharge from hospital   Karri Rivera   Home Medication Instructions STEPHIE:    Printed on:08/24/20 4508   Medication Information                      carbidopa-levodopa (SINEMET)  MG per tablet  Take 3 tablets by mouth 4 times daily             diltiazem (CARDIZEM CD) 120 MG extended release capsule  Take 1 capsule by mouth daily             fexofenadine (ALLEGRA) 180 MG tablet  Take 180 mg by mouth daily             metoprolol (LOPRESSOR) 100 MG tablet  TAKE 1 TABLET BY MOUTH TWICE A DAY             rivaroxaban 15 & 20 MG Starter Pack  Take as directed on package.              selegiline (ELDEPRYL) 5 MG capsule                     Medications marked \"taking\" at this time  Outpatient Medications Marked as Taking for the 8/24/20 encounter (Office Visit) with Dru Franco MD   Medication Sig Dispense Refill    rivaroxaban 15 & 20 MG Starter Pack Take as directed on package. 1 Package 0    fexofenadine (ALLEGRA) 180 MG tablet Take 180 mg by mouth daily      carbidopa-levodopa (SINEMET)  MG per tablet Take 3 tablets by mouth 4 times daily (Patient taking differently: Take 3 tablets by mouth 4 times daily Pt takes at 2602-6199 noon-1600-2000) 120 tablet 1    diltiazem (CARDIZEM CD) 120 MG extended release capsule Take 1 capsule by mouth daily 90 capsule 3    metoprolol (LOPRESSOR) 100 MG tablet TAKE 1 TABLET BY MOUTH TWICE A DAY  3        Medications patient taking as of now reconciled against medications ordered at time of hospital discharge: Yes    Chief Complaint   Patient presents with    Follow-Up from Hospital       HPI  Patient was recently admitted to the hospital for atrial fibrillation. The patient actually had an episode of syncope as a result of his A. fib. He reports that his heart rate was really slow but in reviewing the records, it would appear that he had a very fast heart rate which caused him to have a syncope. He has not had any recurrent symptoms. Inpatient course: Discharge summary reviewed- see chart. Interval history/Current status: Good    Review of Systems    Vitals:    08/24/20 0928   BP: 126/82   Pulse: 58   Temp: 97.3 °F (36.3 °C)   TempSrc: Infrared   SpO2: 98%   Weight: 235 lb (106.6 kg)     Body mass index is 31.87 kg/m². Wt Readings from Last 3 Encounters:   08/24/20 235 lb (106.6 kg)   08/20/20 231 lb 8 oz (105 kg)   07/30/20 235 lb (106.6 kg)     BP Readings from Last 3 Encounters:   08/24/20 126/82   08/20/20 (!) 145/70   07/30/20 120/72       Physical Exam  Vitals signs reviewed. Constitutional:       General: He is not in acute distress. Appearance: He is well-developed. He is not diaphoretic. HENT:      Head: Normocephalic and atraumatic. Pulmonary:      Effort: Pulmonary effort is normal.   Neurological:      Mental Status: He is alert and oriented to person, place, and time.       Cranial

## 2020-09-13 ENCOUNTER — HOSPITAL ENCOUNTER (OUTPATIENT)
Age: 60
Setting detail: OBSERVATION
Discharge: HOME | End: 2020-09-14
Attending: EMERGENCY MEDICINE | Admitting: INTERNAL MEDICINE
Payer: MEDICARE

## 2020-09-13 ENCOUNTER — APPOINTMENT (OUTPATIENT)
Dept: GENERAL RADIOLOGY | Age: 60
End: 2020-09-13
Payer: MEDICARE

## 2020-09-13 PROBLEM — I48.92 ATRIAL FLUTTER WITH RAPID VENTRICULAR RESPONSE (HCC): Status: ACTIVE | Noted: 2020-09-13

## 2020-09-13 LAB
ANION GAP SERPL CALCULATED.3IONS-SCNC: 13 MMOL/L (ref 3–16)
BASOPHILS ABSOLUTE: 0 K/UL (ref 0–0.2)
BASOPHILS RELATIVE PERCENT: 0.4 %
BUN BLDV-MCNC: 15 MG/DL (ref 7–20)
CALCIUM SERPL-MCNC: 9.6 MG/DL (ref 8.3–10.6)
CHLORIDE BLD-SCNC: 103 MMOL/L (ref 99–110)
CO2: 23 MMOL/L (ref 21–32)
CREAT SERPL-MCNC: 0.9 MG/DL (ref 0.8–1.3)
EOSINOPHILS ABSOLUTE: 0.3 K/UL (ref 0–0.6)
EOSINOPHILS RELATIVE PERCENT: 4.5 %
GFR AFRICAN AMERICAN: >60
GFR NON-AFRICAN AMERICAN: >60
GLUCOSE BLD-MCNC: 97 MG/DL (ref 70–99)
HCT VFR BLD CALC: 44.8 % (ref 40.5–52.5)
HEMOGLOBIN: 15.2 G/DL (ref 13.5–17.5)
INR BLD: 1.29 (ref 0.86–1.14)
LYMPHOCYTES ABSOLUTE: 1.6 K/UL (ref 1–5.1)
LYMPHOCYTES RELATIVE PERCENT: 23.3 %
MCH RBC QN AUTO: 33.5 PG (ref 26–34)
MCHC RBC AUTO-ENTMCNC: 33.9 G/DL (ref 31–36)
MCV RBC AUTO: 98.9 FL (ref 80–100)
MONOCYTES ABSOLUTE: 0.7 K/UL (ref 0–1.3)
MONOCYTES RELATIVE PERCENT: 10.9 %
NEUTROPHILS ABSOLUTE: 4.1 K/UL (ref 1.7–7.7)
NEUTROPHILS RELATIVE PERCENT: 60.9 %
PDW BLD-RTO: 14 % (ref 12.4–15.4)
PLATELET # BLD: 161 K/UL (ref 135–450)
PMV BLD AUTO: 9.7 FL (ref 5–10.5)
POTASSIUM SERPL-SCNC: 4.2 MMOL/L (ref 3.5–5.1)
PROTHROMBIN TIME: 15 SEC (ref 10–13.2)
RBC # BLD: 4.54 M/UL (ref 4.2–5.9)
SODIUM BLD-SCNC: 139 MMOL/L (ref 136–145)
TROPONIN: <0.01 NG/ML
WBC # BLD: 6.7 K/UL (ref 4–11)

## 2020-09-13 PROCEDURE — 84484 ASSAY OF TROPONIN QUANT: CPT

## 2020-09-13 PROCEDURE — 71046 X-RAY EXAM CHEST 2 VIEWS: CPT

## 2020-09-13 PROCEDURE — 93005 ELECTROCARDIOGRAM TRACING: CPT | Performed by: INTERNAL MEDICINE

## 2020-09-13 PROCEDURE — 80048 BASIC METABOLIC PNL TOTAL CA: CPT

## 2020-09-13 PROCEDURE — 2580000003 HC RX 258: Performed by: PHYSICIAN ASSISTANT

## 2020-09-13 PROCEDURE — 96376 TX/PRO/DX INJ SAME DRUG ADON: CPT

## 2020-09-13 PROCEDURE — 85610 PROTHROMBIN TIME: CPT

## 2020-09-13 PROCEDURE — 2500000003 HC RX 250 WO HCPCS: Performed by: PHYSICIAN ASSISTANT

## 2020-09-13 PROCEDURE — 36415 COLL VENOUS BLD VENIPUNCTURE: CPT

## 2020-09-13 PROCEDURE — 93005 ELECTROCARDIOGRAM TRACING: CPT | Performed by: EMERGENCY MEDICINE

## 2020-09-13 PROCEDURE — 99285 EMERGENCY DEPT VISIT HI MDM: CPT

## 2020-09-13 PROCEDURE — 85025 COMPLETE CBC W/AUTO DIFF WBC: CPT

## 2020-09-13 PROCEDURE — 96365 THER/PROPH/DIAG IV INF INIT: CPT

## 2020-09-13 PROCEDURE — G0378 HOSPITAL OBSERVATION PER HR: HCPCS

## 2020-09-13 PROCEDURE — 84443 ASSAY THYROID STIM HORMONE: CPT

## 2020-09-13 RX ORDER — ACETAMINOPHEN 325 MG/1
650 TABLET ORAL EVERY 6 HOURS PRN
Status: DISCONTINUED | OUTPATIENT
Start: 2020-09-13 | End: 2020-09-14 | Stop reason: HOSPADM

## 2020-09-13 RX ORDER — ACETAMINOPHEN 650 MG/1
650 SUPPOSITORY RECTAL EVERY 6 HOURS PRN
Status: DISCONTINUED | OUTPATIENT
Start: 2020-09-13 | End: 2020-09-14 | Stop reason: HOSPADM

## 2020-09-13 RX ORDER — POLYETHYLENE GLYCOL 3350 17 G/17G
17 POWDER, FOR SOLUTION ORAL DAILY PRN
Status: DISCONTINUED | OUTPATIENT
Start: 2020-09-13 | End: 2020-09-14 | Stop reason: HOSPADM

## 2020-09-13 RX ORDER — DILTIAZEM HYDROCHLORIDE 180 MG/1
180 CAPSULE, COATED, EXTENDED RELEASE ORAL DAILY
Status: DISCONTINUED | OUTPATIENT
Start: 2020-09-14 | End: 2020-09-14 | Stop reason: HOSPADM

## 2020-09-13 RX ORDER — DILTIAZEM HYDROCHLORIDE 5 MG/ML
10 INJECTION INTRAVENOUS ONCE
Status: COMPLETED | OUTPATIENT
Start: 2020-09-13 | End: 2020-09-13

## 2020-09-13 RX ORDER — METOPROLOL TARTRATE 50 MG/1
100 TABLET, FILM COATED ORAL 2 TIMES DAILY
Status: DISCONTINUED | OUTPATIENT
Start: 2020-09-14 | End: 2020-09-14 | Stop reason: HOSPADM

## 2020-09-13 RX ORDER — ONDANSETRON 2 MG/ML
4 INJECTION INTRAMUSCULAR; INTRAVENOUS EVERY 6 HOURS PRN
Status: DISCONTINUED | OUTPATIENT
Start: 2020-09-13 | End: 2020-09-14 | Stop reason: HOSPADM

## 2020-09-13 RX ORDER — PROMETHAZINE HYDROCHLORIDE 25 MG/1
12.5 TABLET ORAL EVERY 6 HOURS PRN
Status: DISCONTINUED | OUTPATIENT
Start: 2020-09-13 | End: 2020-09-14 | Stop reason: HOSPADM

## 2020-09-13 RX ORDER — SODIUM CHLORIDE 0.9 % (FLUSH) 0.9 %
10 SYRINGE (ML) INJECTION EVERY 12 HOURS SCHEDULED
Status: DISCONTINUED | OUTPATIENT
Start: 2020-09-14 | End: 2020-09-14 | Stop reason: HOSPADM

## 2020-09-13 RX ORDER — CETIRIZINE HYDROCHLORIDE 10 MG/1
10 TABLET ORAL DAILY
Status: DISCONTINUED | OUTPATIENT
Start: 2020-09-14 | End: 2020-09-14 | Stop reason: HOSPADM

## 2020-09-13 RX ORDER — SODIUM CHLORIDE 0.9 % (FLUSH) 0.9 %
10 SYRINGE (ML) INJECTION PRN
Status: DISCONTINUED | OUTPATIENT
Start: 2020-09-13 | End: 2020-09-14 | Stop reason: HOSPADM

## 2020-09-13 RX ADMIN — DILTIAZEM HYDROCHLORIDE 10 MG: 5 INJECTION INTRAVENOUS at 20:25

## 2020-09-13 RX ADMIN — DILTIAZEM HYDROCHLORIDE 5 MG/HR: 5 INJECTION INTRAVENOUS at 21:43

## 2020-09-13 ASSESSMENT — ENCOUNTER SYMPTOMS
COUGH: 0
SHORTNESS OF BREATH: 0
VOMITING: 0
NAUSEA: 0
ABDOMINAL PAIN: 0

## 2020-09-13 ASSESSMENT — PAIN DESCRIPTION - LOCATION: LOCATION: ANKLE

## 2020-09-13 ASSESSMENT — PAIN SCALES - GENERAL: PAINLEVEL_OUTOF10: 4

## 2020-09-13 ASSESSMENT — PAIN DESCRIPTION - PAIN TYPE: TYPE: CHRONIC PAIN

## 2020-09-13 ASSESSMENT — PAIN DESCRIPTION - ORIENTATION: ORIENTATION: LEFT

## 2020-09-13 NOTE — LETTER
[facility]  [facility] Procedure Sheet    9/14/20  Suhas Suresh  1960  EP Procedures     Pacemaker implant (single/dual)  EP Study    ICD implant (single/dual)  Atrial flutter ablation (MASSIMO Y/N)    Biv implant ICD  Tilt Table    Biv implant PPM XXX Atrial fibrillation ablation (MASSIMO Yes)    Generator Change (PPM/ICD/BiV)  SVT ablation    Lead revision (RV/LA/RA) (<1 month)  VT ablation      Lead extraction +/- upgrade (BiV/PPM/ICD)  VT Ischemic/ non-ischemic    Loop implant/ removal  VT RVOT    Cardioversion  VT Left sided    MASSIMO  AVN ablation     Equipment     Medtronic   JULIA Mapping System    [employer]. [employer]  Carto Mapping System    [employer]  CryoAblation    Biotronik  Laser Lead Extraction     EP Procedures Scheduling Request    # hours Requested   Scheduled  Date:   Specific Day  Completed    Anesthesia XXX F/u Date:   CT surgery backup      Overnight stay XXX     Location [facility]       Pre-Procedure Labs / Imaging     PT/INR  Type & cross    CBC  Units PRBC    BMP/Mg  Units FFP    Venogram  CXR    Echo  Cardiac CTA for Pulmonary vein mapping     RN INITIALS: NPAL    Patient Instructions  Do not eat or drink after midnight the night prior to procedure  Dx: paroxysmal atria firbillation  Hold Eliquis for 1 day prior

## 2020-09-14 VITALS
TEMPERATURE: 98.2 F | HEIGHT: 72 IN | BODY MASS INDEX: 31.68 KG/M2 | OXYGEN SATURATION: 95 % | RESPIRATION RATE: 16 BRPM | HEART RATE: 64 BPM | DIASTOLIC BLOOD PRESSURE: 82 MMHG | SYSTOLIC BLOOD PRESSURE: 140 MMHG | WEIGHT: 233.91 LBS

## 2020-09-14 PROBLEM — I48.91 ATRIAL FIBRILLATION WITH RVR (HCC): Status: RESOLVED | Noted: 2020-08-18 | Resolved: 2020-09-14

## 2020-09-14 PROBLEM — I48.92 ATRIAL FLUTTER WITH RAPID VENTRICULAR RESPONSE (HCC): Status: RESOLVED | Noted: 2020-09-13 | Resolved: 2020-09-14

## 2020-09-14 LAB
ALBUMIN SERPL-MCNC: 4.1 G/DL (ref 3.4–5)
ANION GAP SERPL CALCULATED.3IONS-SCNC: 9 MMOL/L (ref 3–16)
BUN BLDV-MCNC: 15 MG/DL (ref 7–20)
CALCIUM SERPL-MCNC: 9.8 MG/DL (ref 8.3–10.6)
CHLORIDE BLD-SCNC: 105 MMOL/L (ref 99–110)
CO2: 25 MMOL/L (ref 21–32)
CREAT SERPL-MCNC: 0.7 MG/DL (ref 0.8–1.3)
GFR AFRICAN AMERICAN: >60
GFR NON-AFRICAN AMERICAN: >60
GLUCOSE BLD-MCNC: 103 MG/DL (ref 70–99)
MAGNESIUM: 2.3 MG/DL (ref 1.8–2.4)
PHOSPHORUS: 3.1 MG/DL (ref 2.5–4.9)
POTASSIUM SERPL-SCNC: 4.3 MMOL/L (ref 3.5–5.1)
SODIUM BLD-SCNC: 139 MMOL/L (ref 136–145)
TSH REFLEX: 1.59 UIU/ML (ref 0.27–4.2)

## 2020-09-14 PROCEDURE — 6370000000 HC RX 637 (ALT 250 FOR IP): Performed by: INTERNAL MEDICINE

## 2020-09-14 PROCEDURE — G0378 HOSPITAL OBSERVATION PER HR: HCPCS

## 2020-09-14 PROCEDURE — 83735 ASSAY OF MAGNESIUM: CPT

## 2020-09-14 PROCEDURE — 99223 1ST HOSP IP/OBS HIGH 75: CPT | Performed by: INTERNAL MEDICINE

## 2020-09-14 PROCEDURE — 99204 OFFICE O/P NEW MOD 45 MIN: CPT | Performed by: INTERNAL MEDICINE

## 2020-09-14 PROCEDURE — 94760 N-INVAS EAR/PLS OXIMETRY 1: CPT

## 2020-09-14 PROCEDURE — 2580000003 HC RX 258: Performed by: INTERNAL MEDICINE

## 2020-09-14 PROCEDURE — 93308 TTE F-UP OR LMTD: CPT

## 2020-09-14 PROCEDURE — 80069 RENAL FUNCTION PANEL: CPT

## 2020-09-14 RX ORDER — DILTIAZEM HYDROCHLORIDE 180 MG/1
180 CAPSULE, COATED, EXTENDED RELEASE ORAL DAILY
Qty: 30 CAPSULE | Refills: 3 | Status: SHIPPED | OUTPATIENT
Start: 2020-09-15 | End: 2020-10-06 | Stop reason: SDUPTHER

## 2020-09-14 RX ADMIN — DILTIAZEM HYDROCHLORIDE 30 MG: 30 TABLET, FILM COATED ORAL at 05:45

## 2020-09-14 RX ADMIN — CARBIDOPA AND LEVODOPA 3 TABLET: 25; 100 TABLET ORAL at 16:24

## 2020-09-14 RX ADMIN — CARBIDOPA AND LEVODOPA 3 TABLET: 25; 100 TABLET ORAL at 11:58

## 2020-09-14 RX ADMIN — CETIRIZINE HYDROCHLORIDE 10 MG: 10 TABLET, FILM COATED ORAL at 08:20

## 2020-09-14 RX ADMIN — METOPROLOL TARTRATE 100 MG: 50 TABLET, FILM COATED ORAL at 08:20

## 2020-09-14 RX ADMIN — RIVAROXABAN 20 MG: 20 TABLET, FILM COATED ORAL at 00:10

## 2020-09-14 RX ADMIN — DILTIAZEM HYDROCHLORIDE 180 MG: 180 CAPSULE, COATED, EXTENDED RELEASE ORAL at 08:20

## 2020-09-14 RX ADMIN — DILTIAZEM HYDROCHLORIDE 30 MG: 30 TABLET, FILM COATED ORAL at 00:09

## 2020-09-14 RX ADMIN — CARBIDOPA AND LEVODOPA 3 TABLET: 25; 100 TABLET ORAL at 08:20

## 2020-09-14 RX ADMIN — METOPROLOL TARTRATE 100 MG: 50 TABLET, FILM COATED ORAL at 00:10

## 2020-09-14 RX ADMIN — Medication 10 ML: at 08:21

## 2020-09-14 ASSESSMENT — PAIN SCALES - GENERAL
PAINLEVEL_OUTOF10: 0

## 2020-09-14 NOTE — PLAN OF CARE
Problem: Activity:  Goal: Ability to tolerate increased activity will improve  Description: Ability to tolerate increased activity will improve  Outcome: Completed  Goal: Expression of feelings of increased energy will increase  Description: Expression of feelings of increased energy will increase  Outcome: Completed     Problem: Cardiac:  Goal: Ability to maintain an adequate cardiac output will improve  Description: Ability to maintain an adequate cardiac output will improve  Outcome: Completed  Goal: Complications related to the disease process, condition or treatment will be avoided or minimized  Description: Complications related to the disease process, condition or treatment will be avoided or minimized  Outcome: Completed     Problem: Coping:  Goal: Level of anxiety will decrease  Description: Level of anxiety will decrease  Outcome: Completed  Goal: General experience of comfort will improve  Description: General experience of comfort will improve  Outcome: Completed     Problem: Health Behavior:  Goal: Ability to manage health-related needs will improve  Description: Ability to manage health-related needs will improve  Outcome: Completed     Problem: Safety:  Goal: Ability to remain free from injury will improve  Description: Ability to remain free from injury will improve  Outcome: Completed  Goal: Will show no signs and symptoms of excessive bleeding  Description: Will show no signs and symptoms of excessive bleeding  Outcome: Completed     Problem: Pain:  Description: Pain management should include both nonpharmacologic and pharmacologic interventions.   Goal: Pain level will decrease  Description: Pain level will decrease  Outcome: Completed  Goal: Control of acute pain  Description: Control of acute pain  Outcome: Completed  Goal: Control of chronic pain  Description: Control of chronic pain  Outcome: Completed     Problem: Falls - Risk of:  Goal: Will remain free from falls  Description: Will remain free from falls  Outcome: Completed  Goal: Absence of physical injury  Description: Absence of physical injury  Outcome: Completed

## 2020-09-14 NOTE — ED PROVIDER NOTES
I independently performed a history and physical on Karri Rivera. All diagnostic, treatment, and disposition decisions were made by myself in conjunction with the advanced practice provider. Briefly, this is a 63 y.o. male here for palpitations which began about 2 hours PTA. Reports symptoms similar to past episodes of atrial fibrillation for which he has had ablation in the past. Currently on diltiazem 120mg and Xarelto. Denies any chest pain. On exam, Patient afebrile and nontoxic. No distress. Heart tachycardic, irregular irregular rhythm, rate variable from 105-150bpm on monitor during my evaluation. Lungs CTAB. Abdomen soft, nondistended, nontender to palpation in all quadrants. EKG  EKG was reviewed by emergency department physician in the absence of a cardiologist    Narrow complex irregular rhythm, rate 115, normal axis, normal QRS intervals, normal Qtc, no ST elevations or depressions, normal t-wave morphology, impression atrial flutter with variable AV block, no STEMI      Screenings   Robert Coma Scale  Eye Opening: Spontaneous  Best Verbal Response: Oriented  Best Motor Response: Obeys commands  Robert Coma Scale Score: 15        MDM    Patient afebrile and nontoxic. No distress. EKG with atrial flutter, clear sawtooth waves visible, not acutely ischemic. Troponin normal, no CP, ACS felt unlikely. No findings to suggest infection. Lab workup, reassuring, no anemia or clinically significant electrolyte abnormalities. Patient received diltiazem bolus at which point HR decreased to 90's, however on frequent re-evaluations would variable increase again into the 120's at which point patient started on diltiazem infusion. Patient already appropriately anticoagulated on Xarelto and reports adherence. Case discussed with internal medicine team and will admit, at time of evaluation in the ED by Dr. Scotty Alvarado patient did appear to have converted to NSR which was confirmed on repeat EKG. Remained alert and hemodynamically stable over ED course. Critical Care:    I have discussed the case with the advanced practice provider. I have personally performed a history, physical exam, and my own medical decision making. I have reviewed the note and agree with the findings and plan. Upon my evaluation, this patient had a high probability of imminent or life-threatening deterioration due to cardiac dysrhythmia requiring IV cardioactive medications which required my direct attention, intervention, and personal management. Multiple re-evaluations at bedside to monitor hemodynamics personally performed. The total critical care time personally spent while evaluating and treating this patient was at least 31 minutes exclusive of any time spent doing separately billable procedures. This includes time at the bedside, data interpretation, medication management, monitoring for potential decompensation and physician consultation. Specifics of interventions taken and potentially life-threatening diagnostic considerations are listed above in the medical decision making. Patient Referrals:  Robb Madrid MD  [address] [place] [address]  [phone]            Discharge Medications:  Current Discharge Medication List          FINAL IMPRESSION  1. Atrial fibrillation with RVR (HCC)        Blood pressure (!) 140/82, pulse 64, temperature 98.2 °F (36.8 °C), temperature source Temporal, resp. rate 16, height 6' (1.829 m), weight 233 lb 14.5 oz (106.1 kg), SpO2 95 %. For further details of Nirmal Jackson emergency department encounter, please see documentation by advanced practice provider, MONTSE Salinas.     Bertha Bhardwaj DO (electronically signed)  Attending Emergency Physician       Bertha Bhardwaj DO  09/14/20 8424

## 2020-09-14 NOTE — ED NOTES
Pt HR in the 90s at this time. Diltiazem drip held at this time per Junaid Corrales. All other pt vss at this time. Pt denies any further needs. Will continue to monitor.       Gale Garrett RN  09/13/20 2128

## 2020-09-14 NOTE — H&P
Hospital Medicine History and Physical    9/13/2020    Date of Admission: 9/13/2020    Date of Service: Pt seen/examined on 9/13/2020 and admitted to observation. Assessment/plan:  1. Atrial flutter with rapid ventricular response. Initial heart rate as high as 148 on presentation to the emergency room, improved to assess low as the 90s on 5 mg of Cardizem infusion, needing to actually pause Cardizem infusion in the emergency room. He has now converted to sinus rhythm. Okay to discontinue Cardizem infusion at this time. Will give short acting Cardizem p.o. 30 mg every 6 hours x 2 doses, then increase home dose of extended release Cardizem from 120 mg daily to 180 mg daily starting at 9 AM tomorrow. Continue home dose of metoprolol 100 mg twice daily. Continue with Xarelto. Check TSH. Patient had recent MASSIMO. Consult EP to assist with evaluation in the morning. 2. Other comorbidities: essential hypertension, obstructive sleep apnea, osteoarthritis, Parkinson's disease, obesity with BMI of 31 kg/m². Activities: Up with assist  Prophylaxis: On Xarelto  Code status: Full time    ==========================================================  Chief complaint:  Chief Complaint   Patient presents with    Palpitations     was sitting on couch and felt palpitations, felt like his afib was acting up. denies sob       History of Presenting Illness: This is a pleasant 63 y.o. male with history of essential hypertension, obstructive sleep apnea, osteoarthritis, Parkinson's disease, history of paroxysmal atrial fibrillation/atrial flutter status post ablation in the past, who presents to the emergency room with complaints of palpitations that started while he was sitting on his couch. He checked his heart rate using his blood pressure monitor at home, noted to be as high as 155 beats per minutes. He did not have chest pain or shortness of breath. No other significant symptoms on presentation.   He was noted to have heart rate as high as 148 beats per minutes, EKG consistent with atrial flutter with variable conduction block. He was started on Cardizem infusion in the emergency room, after which heart rate went into the 90s and Cardizem infusion had to be paused. It was later restarted but patient noted to be in sinus rhythm at the time of my evaluation in ER. Patient is being admitted for atrial flutter with rapid ventricular response.     Past Medical History:      Diagnosis Date    Anxiety     Atrial fibrillation ([address]     Atrial flutter ([address]     Cervical arthritis 3/30/2012    CTS (carpal tunnel syndrome) 3/13/2013    Essential tremor 2017    Hypertension     KATELIN (obstructive sleep apnea)     DOES NOT USE C-PAP    Osteoarthritis of back 11/6/2014    Osteoarthritis of right hip 11/6/2014    Parkinson disease ([address]     S/P ablation of atrial fibrillation 2/25/2016    Radiofrequency ablation of atrial fibrillation and pulmonary veins isolation Additional ablation of complex atrial fractionated electrogram     Sinusitis     Trigger finger 12/10/2014       Past Surgical History:      Procedure Laterality Date    ANKLE SURGERY  2015    ANKLE SURGERY Left     ATRIAL ABLATION SURGERY  1998    ATRIAL ABLATION SURGERY  2/25/16    RFCA of AF and PVI, additional ablation complex atrial fractioned electrogram    BACK SURGERY  1984    lumbar patino    CARDIOVERSION  4/6/16    CARPAL TUNNEL RELEASE Right 11/19/2013    RIGHT CARPAL TUNNEL RELEASE      CARPAL TUNNEL RELEASE Left 12-10-13     CARPAL TUNNEL RELEASE                COLONOSCOPY  10/29/2007    COLONOSCOPY N/A 7/30/2020    COLONOSCOPY POLYPECTOMY SNARE/COLD BIOPSY performed by Melida Gaucher, MD at [facility] ARTHROPLASTY Right 2016    right hip replacement       Medications (prior to admission):  Prior to Admission medications    Medication Sig Start Date End Date Taking? Authorizing Provider   rivaroxaban 15 & 20 MG Starter Pack Take as directed on package. 8/20/20  Yes EMILIA Jesus CNP   fexofenadine (ALLEGRA) 180 MG tablet Take 180 mg by mouth daily   Yes Historical Provider, MD   carbidopa-levodopa (SINEMET)  MG per tablet Take 3 tablets by mouth 4 times daily  Patient taking differently: Take 3 tablets by mouth 4 times daily Pt takes at [id number] 2/6/20  Yes EMILIA Rowe CNP   diltiazem (CARDIZEM CD) 120 MG extended release capsule Take 1 capsule by mouth daily 2/5/20  Yes Charmaine Rai MD   metoprolol (LOPRESSOR) 100 MG tablet TAKE 1 TABLET BY MOUTH TWICE A DAY 6/16/19  Yes Historical Provider, MD   selegiline (ELDEPRYL) 5 MG capsule  7/14/20   Historical Provider, MD       Allergy(ies):  Patient has no known allergies. Social History:  TOBACCO:  reports that he quit smoking about 24 years ago. He has a 15.00 pack-year smoking history. He has never used smokeless tobacco.  ETOH:  reports current alcohol use of about 20.0 standard drinks of alcohol per week. Family History:      Problem Relation Age of Onset    High Blood Pressure Mother     High Cholesterol Mother     Alcohol Abuse Father     Other Other         afib    Heart Disease Neg Hx     Mental Illness Neg Hx        Review of Systems:  Pertinent positives are listed in HPI. At least 10-point ROS reviewed and were negative. Vitals and physical examination:  /72   Pulse 90   Temp 98 °F (36.7 °C) (Oral)   Resp 17   Ht 6' (1.829 m)   Wt 235 lb (106.6 kg)   SpO2 95%   BMI 31.87 kg/m²   Gen/overall appearance: Not in acute distress. Alert. Oriented X3. Head: Normocephalic, atraumatic  Eyes: EOMI, good acuity  ENT: Oral mucosa moist  Neck: No JVD, thyromegaly  CVS: Nml S1S2, no MRG, RRR  Pulm: Clear bilaterally. No crackles/wheezes  Gastrointestinal: Soft, NT/ND, +BS  Musculoskeletal: No edema. Warm  Neuro: No focal deficit. Moves extremity spontaneously. Psychiatry: Appropriate affect. Not agitated. Skin: Warm, dry with normal turgor. No rash  Capillary refill: Brisk,< 3 seconds   Peripheral Pulses: +2 palpable, equal bilaterally       Labs/imaging/EKG:  CBC:   Recent Labs     09/13/20 2006   WBC 6.7   HGB 15.2        BMP:    Recent Labs     09/13/20 2006      K 4.2      CO2 23   BUN 15   CREATININE 0.9   GLUCOSE 97       Xr Chest (2 Vw)    Result Date: 9/13/2020  EXAMINATION: TWO XRAY VIEWS OF THE CHEST 9/13/2020 8:09 pm COMPARISON: 08/18/2020, 06/01/2018 HISTORY: ORDERING SYSTEM PROVIDED HISTORY: palpitations TECHNOLOGIST PROVIDED HISTORY: Reason for exam:->palpitations Reason for Exam: palpitations Acuity: Acute Type of Exam: Initial FINDINGS: Frontal and lateral views of the chest were obtained. There is no acute skeletal abnormality. The heart size and mediastinal contours are within normal limits. The lungs are clear, without acute airspace consolidation, pneumothorax, or pleural effusion. No acute cardiopulmonary disease. Xr Chest Portable    Result Date: 8/18/2020  EXAMINATION: ONE XRAY VIEW OF THE CHEST 8/18/2020 9:15 pm COMPARISON: Chest x-ray dated 06/01/2018 HISTORY: ORDERING SYSTEM PROVIDED HISTORY: irregular heart beat TECHNOLOGIST PROVIDED HISTORY: Reason for exam:->irregular heart beat FINDINGS: HEART/MEDIASTINUM: The cardiomediastinal silhouette is within normal limits. PLEURA/LUNGS: There are no focal consolidations or pleural effusions. There is no appreciable pneumothorax. BONES/SOFT TISSUE: No acute abnormality. No radiographic evidence of acute pulmonary disease. EKG: Atrial flutter with variable conduction block. I reviewed EKG. Discussed with ER provider.       Thank you Kristi Maldonado MD for the opportunity to be involved in this patient's care.    -----------------------------  Andreina Thompson MD  [facility] hospitalist

## 2020-09-14 NOTE — CONSULTS
[facility]   Electrophysiology Consultation   Date: 9/14/2020  Reason for Consultation: Palpitation    Consult Requesting Physician: Avelina Maldonado MD   Chief Complaint   Patient presents with    Palpitations     was sitting on couch and felt palpitations, felt like his afib was acting up. denies sob       CC: Palpitation   HPI: Sacha Posey is a 63 y.o. male who has presented to the hospital with increasing palpitation. He states that he was watching TV when suddenly noted palpitation with rapid ventricular rate. No associated SOB or chest pain. Has history of prior atrial fibrillation. Came to the hospital and found to be in atrial fibrillation with RVR. EP has been consulted. Palpitation, with no exacerbating or relieving factors. Lasted an hour. He has history of obesity, PAF and is s/p PVI and CFAE ablation on 2/25/2016     Assessment and plan: Active Hospital Problems    Diagnosis Date Noted    Atrial flutter with rapid ventricular response ([address] [I48.92] 09/13/2020    Atrial fibrillation with RVR ([address] [I48.91] 08/18/2020     - Atrial fibrillation with RVR:    Atrial fibrillation is worsening in the past few months. Has had recurrent episodes and last DCCV on 8/20/2020   Symptomatic. We discussed treatment options including antiarrhythmics, rate control with anticoagulation, and ablation. We discussed progressive nature of atrial fibrillation. Treatment success decreases when AF becomes persistent and last more than 6 months. Antiarrhythmic therapy, side effects, benefits and alternative discussed. Atrial fibrillation ablation procedure was discussed. We discussed the need for repeat procedure. On average patients may need more than one ablation procedure.      Risks associated with ablation include but not limited to allergic reaction to the medications, pain, bleeding, infection, nerve injury, injury to diaphragm(breathing muscle), pulmonary embolus(blood clot in lungs), deep vein blood clot, pneumothorax, hemothorax, acute renal failure, cardiac perforation,  tamponade, need for emergent surgery (open heart), permanent pacemaker, pulmonary vein stenosis, left atrial to esophageal fistula, stroke, myocardial infarction and death. Difference between atrial fibrillation and atrial flutter discussed and treatment discussed. Patient verbalized understanding of procedure, risks and benefits and wishes to proceed with ablation. Schedule for RF ablation and [employer] navigation system. On Xarelto  On cardizem  mg/day       - HTN:  Controlled. Continue current medications.     - KATELIN: on CPAP  Stable. - Obesity: Body mass index is 31.72 kg/m². Stable. - Parkinson disease   Stable on Sinemet. Discussed with nursing staff. Diagnostic studies:   I independently reviewed the cardiac diagnostic studies. ECG: Atrial fibrillation with RVR   Echo: EF: 55%, no RWMA     Physical Examination:  Vitals:    20 1200   BP:    Pulse: 64   Resp:    Temp:    SpO2:       In: 480 [P.O.:480]  Out: -    Wt Readings from Last 3 Encounters:   20 233 lb 14.5 oz (106.1 kg)   20 235 lb (106.6 kg)   20 231 lb 8 oz (105 kg)     Temp  Av.7 °F (36.5 °C)  Min: 97.1 °F (36.2 °C)  Max: 98.2 °F (36.8 °C)  Pulse  Av.4  Min: 60  Max: 148  BP  Min: 109/58  Max: 147/105  SpO2  Av.5 %  Min: 91 %  Max: 98 %    Intake/Output Summary (Last 24 hours) at 2020 1511  Last data filed at 2020 1230  Gross per 24 hour   Intake 480 ml   Output --   Net 480 ml         I independently reviewed all cardiac tracing from cardiac telemetry. · Constitutional: Oriented. No distress. · Head: Normocephalic and atraumatic. · Mouth/Throat: Oropharynx is clear and moist.   · Eyes: Conjunctivae normal. EOM are normal.   · Neck: Neck supple. No JVD present. · Cardiovascular: Normal rate, regular rhythm, S1&S2. · Pulmonary/Chest: Bilateral respiratory sounds.  No rhonchi. · Abdominal: Soft. No tenderness. · Musculoskeletal: No tenderness. No edema    · Lymphadenopathy: Has no cervical adenopathy. · Neurological: Alert and oriented. Follows command, No Gross deficit   · Skin: Skin is warm, No rash noted. · Psychiatric: Has a normal behavior       Scheduled Meds:   dilTIAZem  180 mg Oral Daily    carbidopa-levodopa  3 tablet Oral 4x Daily    cetirizine  10 mg Oral Daily    metoprolol tartrate  100 mg Oral BID    sodium chloride flush  10 mL Intravenous 2 times per day    rivaroxaban  20 mg Oral Daily     Continuous Infusions:  PRN Meds:.perflutren lipid microspheres, sodium chloride flush, acetaminophen **OR** acetaminophen, polyethylene glycol, promethazine **OR** ondansetron     Review of System:  [x] Full ROS obtained and negative except as mentioned in HPI    Prior to Admission medications    Medication Sig Start Date End Date Taking? Authorizing Provider   dilTIAZem (CARDIZEM CD) 180 MG extended release capsule Take 1 capsule by mouth daily 9/15/20  Yes Sb May MD   rivaroxaban 15 & 20 MG Starter Pack Take as directed on package.  8/20/20  Yes EMILIA Jesus - CNP   fexofenadine (ALLEGRA) 180 MG tablet Take 180 mg by mouth daily   Yes Historical Provider, MD   carbidopa-levodopa (SINEMET)  MG per tablet Take 3 tablets by mouth 4 times daily  Patient taking differently: Take 3 tablets by mouth 4 times daily Pt takes at [id number] 2/6/20  Yes EMILIA Boston CNP   metoprolol (LOPRESSOR) 100 MG tablet TAKE 1 TABLET BY MOUTH TWICE A DAY 6/16/19  Yes Historical Provider, MD   selegiline (ELDEPRYL) 5 MG capsule  7/14/20   Historical Provider, MD       Past Medical History:   Diagnosis Date    Anxiety     Atrial fibrillation ([address]     Atrial flutter ([address]     Cervical arthritis 3/30/2012    CTS (carpal tunnel syndrome) 3/13/2013    Essential tremor 2017    Hypertension     KATELIN (obstructive sleep apnea) DOES NOT USE C-PAP    Osteoarthritis of back 11/6/2014    Osteoarthritis of right hip 11/6/2014    [facility] [address]     S/P ablation of atrial fibrillation 2/25/2016    Radiofrequency ablation of atrial fibrillation and pulmonary veins isolation Additional ablation of complex atrial fractionated electrogram     Sinusitis     Trigger finger 12/10/2014        Past Surgical History:   Procedure Laterality Date    ANKLE SURGERY  2015    ANKLE SURGERY Left     ATRIAL ABLATION SURGERY  1998    ATRIAL ABLATION SURGERY  2/25/16    RFCA of AF and PVI, additional ablation complex atrial fractioned electrogram    BACK SURGERY  1984    lumbar patino    CARDIOVERSION  4/6/16    CARPAL TUNNEL RELEASE Right 11/19/2013    RIGHT CARPAL TUNNEL RELEASE      CARPAL TUNNEL RELEASE Left 12-10-13     CARPAL TUNNEL RELEASE                COLONOSCOPY  10/29/2007    COLONOSCOPY N/A 7/30/2020    COLONOSCOPY POLYPECTOMY SNARE/COLD BIOPSY performed by Randi Redd MD at [address]      TOE SURGERY      TOTAL HIP ARTHROPLASTY Right 2016    right hip replacement       No Known Allergies    Social History:  Reviewed. reports that he quit smoking about 24 years ago. He has a 15.00 pack-year smoking history. He has never used smokeless tobacco. He reports current alcohol use of about 20.0 standard drinks of alcohol per week. He reports previous drug use. Drug: Marijuana. Family History:  Reviewed. Reviewed. No family history of SCD. Relevant and available labs, and cardiovascular diagnostics reviewed. Reviewed. Recent Labs     09/13/20 2006 09/14/20  0530    139   K 4.2 4.3    105   CO2 23 25   PHOS  --  3.1   BUN 15 15   CREATININE 0.9 0.7*     Recent Labs     09/13/20 2006   WBC 6.7   HGB 15.2   HCT 44.8   MCV 98.9        Estimated Creatinine Clearance: 140 mL/min (A) (based on SCr of 0.7 mg/dL (L)).    No results found for: BNP      I independently reviewed all cardiac tracing from cardiac telemetry. I independently reviewed relevant and available cardiac diagnostic tests ECG, CXR, Echo, Stress test, Device interrogation, Holter, CT scan. All questions and concerns were addressed to the patient/family. Alternatives to my treatment were discussed. I have discussed the above stated plan and the patient verbalized understanding and agreed with the plan. NOTE: This report was transcribed using voice recognition software. Every effort was made to ensure accuracy, however, inadvertent computerized transcription errors may be present.      Tiff Dexter MD, MPH  [facility] [place]   Office: [phone]

## 2020-09-14 NOTE — ED PROVIDER NOTES
EMERGENCY DEPARTMENT ENCOUNTER        Pt Name: Javed Watkins  MRN: [id number]  [place] 1960  Date of evaluation: 9/13/2020  Provider: Nohemi Singh PA-C  PCP: Kayode Beaver MD     I have seen and evaluated this patient with my supervising physician Mason, [facility],[address]       Chief Complaint   Patient presents with    Palpitations     was sitting on couch and felt palpitations, felt like his afib was acting up. denies sob       HISTORY OF PRESENT ILLNESS   (Location, Timing/Onset, Context/Setting, Quality, Duration, Modifying Factors, Severity, Associated Signs and Symptoms)  Note limiting factors. Javed Watkins is a 63 y.o. male patient presents emergency department for evaluation of heart palpitations. Patient has a history of atrial fibrillation that has started acting up within the last 3 weeks. Patient states he had an ablation a number of years ago which was successful and he had been free of atrial fibrillation until he started the Parkinson's medication. Patient states he has since stopped that medication and is only taking carbidopa levodopa for his Parkinson's at this time. Patient states he has been restarted on Xarelto. Patient states he was just sitting on the couch when he started having palpitations. Patient states on blood pressure cuff at home his heart rate was 155. Patient denies any shortness of breath or chest pain. Denies any additional issues at this time. Nursing Notes were all reviewed and agreed with or any disagreements were addressed in the HPI. REVIEW OF SYSTEMS    (2-9 systems for level 4, 10 or more for level 5)     Review of Systems   Constitutional: Negative for fatigue and fever. HENT: Negative. Eyes: Negative for visual disturbance. Respiratory: Negative for cough and shortness of breath. Cardiovascular: Positive for palpitations. Negative for chest pain.    Gastrointestinal: Negative for abdominal pain, nausea and vomiting. Genitourinary: Negative. Musculoskeletal: Negative. Skin: Negative. Neurological: Negative. Positives and Pertinent negatives as per HPI. Except as noted above in the ROS, all other systems were reviewed and negative. PAST MEDICAL HISTORY     Past Medical History:   Diagnosis Date    Anxiety     Atrial fibrillation ([address]     [facility] flutter [facility]     Cervical arthritis 3/30/2012    CTS (carpal tunnel syndrome) 3/13/2013    Essential tremor 2017    Hypertension     KATELIN (obstructive sleep apnea)     DOES NOT USE C-PAP    Osteoarthritis of back 11/6/2014    Osteoarthritis of right hip 11/6/2014    Parkinson disease ([facility]     S/P ablation of atrial fibrillation 2/25/2016    Radiofrequency ablation of atrial fibrillation and pulmonary veins isolation Additional ablation of complex atrial fractionated electrogram     Sinusitis     Trigger finger 12/10/2014         SURGICAL HISTORY     Past Surgical History:   Procedure Laterality Date    ANKLE SURGERY  2015    ANKLE SURGERY Left     ATRIAL ABLATION SURGERY  1998    ATRIAL ABLATION SURGERY  2/25/16    RFCA of AF and PVI, additional ablation complex atrial fractioned electrogram    BACK SURGERY  1984    lumbar patino    CARDIOVERSION  4/6/16    CARPAL TUNNEL RELEASE Right 11/19/2013    RIGHT CARPAL TUNNEL RELEASE      CARPAL TUNNEL RELEASE Left 12-10-13     CARPAL TUNNEL RELEASE                COLONOSCOPY  10/29/2007    COLONOSCOPY N/A 7/30/2020    COLONOSCOPY POLYPECTOMY SNARE/COLD BIOPSY performed by Alexis Warren MD at [address] [place] [address] Right 2016    right hip replacement         CURRENTMEDICATIONS       Current Discharge Medication List      CONTINUE these medications which have NOT CHANGED    Details   rivaroxaban 15 & 20 MG Starter Pack Take as directed on package.   Qty: 1 Package, Refills: 0 fexofenadine (ALLEGRA) 180 MG tablet Take 180 mg by mouth daily      carbidopa-levodopa (SINEMET)  MG per tablet Take 3 tablets by mouth 4 times daily  Qty: 120 tablet, Refills: 1    Associated Diagnoses: Parkinsonian tremor (HCC)      diltiazem (CARDIZEM CD) 120 MG extended release capsule Take 1 capsule by mouth daily  Qty: 90 capsule, Refills: 3    Associated Diagnoses: Atrial fibrillation, unspecified type (HCC)      metoprolol (LOPRESSOR) 100 MG tablet TAKE 1 TABLET BY MOUTH TWICE A DAY  Refills: 3      selegiline (ELDEPRYL) 5 MG capsule                ALLERGIES     Patient has no known allergies. FAMILYHISTORY       Family History   Problem Relation Age of Onset    High Blood Pressure Mother     High Cholesterol Mother     Alcohol Abuse Father     Other Other         afib    Heart Disease Neg Hx     Mental Illness Neg Hx           SOCIAL HISTORY       Social History     Tobacco Use    Smoking status: Former Smoker     Packs/day: 1.00     Years: 15.00     Pack years: 15.00     Last attempt to quit: 1996     Years since quittin.3    Smokeless tobacco: Never Used   Substance Use Topics    Alcohol use: Yes     Alcohol/week: 20.0 standard drinks     Types: 20 Standard drinks or equivalent per week     Comment: weekends     Drug use: Not Currently     Types: Marijuana     Comment: occ       SCREENINGS    [place] Coma Scale  Eye Opening: Spontaneous  Best Verbal Response: Oriented  Best Motor Response: Obeys commands  Robert Coma Scale Score: 15        PHYSICAL EXAM    (up to 7 for level 4, 8 or more for level 5)     ED Triage Vitals [20 1950]   BP Temp Temp Source Pulse Resp SpO2 Height Weight   132/73 98 °F (36.7 °C) Oral 115 17 95 % 6' (1.829 m) 235 lb (106.6 kg)       Physical Exam  Vitals signs and nursing note reviewed. Constitutional:       General: He is not in acute distress. Appearance: Normal appearance. He is well-developed.  He is not ill-appearing, toxic-appearing or diaphoretic. HENT:      Head: Normocephalic and atraumatic. Nose: Nose normal.      Mouth/Throat:      Mouth: Mucous membranes are moist.      Pharynx: Oropharynx is clear. Eyes:      General:         Right eye: No discharge. Left eye: No discharge. Conjunctiva/sclera: Conjunctivae normal.      Pupils: Pupils are equal, round, and reactive to light. Neck:      Musculoskeletal: Normal range of motion and neck supple. Cardiovascular:      Rate and Rhythm: Tachycardia present. Rhythm irregularly irregular. Heart sounds: Normal heart sounds. No murmur. No gallop. Pulmonary:      Effort: Pulmonary effort is normal. No respiratory distress. Breath sounds: Normal breath sounds. No wheezing, rhonchi or rales. Abdominal:      General: Bowel sounds are normal.      Tenderness: There is no abdominal tenderness. There is no guarding or rebound. Musculoskeletal: Normal range of motion. General: No swelling or tenderness. Right lower leg: No edema. Left lower leg: No edema. Skin:     General: Skin is warm and dry. Capillary Refill: Capillary refill takes less than 2 seconds. Coloration: Skin is not jaundiced or pale. Neurological:      General: No focal deficit present. Mental Status: He is alert and oriented to person, place, and time.    Psychiatric:         Mood and Affect: Mood normal.         Behavior: Behavior normal.         DIAGNOSTIC RESULTS   LABS:    Labs Reviewed   PROTIME-INR - Abnormal; Notable for the following components:       Result Value    Protime 15.0 (*)     INR 1.29 (*)     All other components within normal limits    Narrative:     Performed at:  [facility]  [address] [employer]   Phone [phone]   BASIC METABOLIC PANEL    Narrative:     Performed at:  [facility]  [address] [facility], [facility] [employer]   Phone [phone] (!) 145/70    Pulse: 75 75 74    Resp:  20  18   Temp:  97.1 °F (36.2 °C)     TempSrc:  Temporal     SpO2: 96% 96%  91%   Weight:  233 lb 14.5 oz (106.1 kg)     Height:           Patient was given the following medications:  Medications   dilTIAZem (CARDIZEM) tablet 30 mg (30 mg Oral Given 9/14/20 0009)   dilTIAZem (CARDIZEM CD) extended release capsule 180 mg (has no administration in time range)   carbidopa-levodopa (SINEMET)  MG per tablet 3 tablet (3 tablets Oral Not Given 9/14/20 0008)   cetirizine (ZYRTEC) tablet 10 mg (has no administration in time range)   metoprolol tartrate (LOPRESSOR) tablet 100 mg (100 mg Oral Given 9/14/20 0010)   sodium chloride flush 0.9 % injection 10 mL (has no administration in time range)   sodium chloride flush 0.9 % injection 10 mL (has no administration in time range)   acetaminophen (TYLENOL) tablet 650 mg (has no administration in time range)     Or   acetaminophen (TYLENOL) suppository 650 mg (has no administration in time range)   polyethylene glycol (GLYCOLAX) packet 17 g (has no administration in time range)   promethazine (PHENERGAN) tablet 12.5 mg (has no administration in time range)     Or   ondansetron (ZOFRAN) injection 4 mg (has no administration in time range)   rivaroxaban (XARELTO) tablet 20 mg (20 mg Oral Given 9/14/20 0010)   dilTIAZem injection 10 mg (10 mg Intravenous Given 9/13/20 2025)         Patient presents emergency department for evaluation of heart palpitation. Patient has a history of atrial fibrillation that was well controlled with ablation however has started again within the last 3 weeks. Patient states this was stimulated by a Parkinson's medication he is no longer taking. Patient currently on Eliquis. Patient was found to be in an irregularly irregular rate at 150 bpm.  Patient is not in any respiratory distress. He is lying comfortably in the bed. Abdomen is soft nontender without rebound or guarding. No lower leg edema.

## 2020-09-14 NOTE — PROGRESS NOTES
Discharge instructions reviewed with patient and family member. Patient and family verbalized understanding. All home medications have been reviewed, questions answered and patient voiced understanding. All medication side effects reviewed and patient and family verbalized understanding. Follow up appointment(s) reviewed with patient and all attempts made to schedule within 7-10 days of discharge. Patient given prescriptions, discharge instructions, and appointment times. Patient discharged to home with family via private car. Taken to lobby via wheelchair by this RN.

## 2020-09-14 NOTE — PLAN OF CARE
Problem: Activity:  Goal: Ability to tolerate increased activity will improve  Description: Ability to tolerate increased activity will improve  Outcome: Ongoing  Note: Pt's activity tolerance assessed each shift and as needed. Pt requires stand by assistance with ambulation. Ambulation with four-wheel walker has been needed post-op. Pt able to sit up in chair as ordered. Able to ambulate to restroom with little assistance. Will cont to monitor. Problem: Cardiac:  Goal: Ability to maintain an adequate cardiac output will improve  Description: Ability to maintain an adequate cardiac output will improve  Outcome: Ongoing  Note: Pulse rate and rhythm, peripheral pulses, and capillary refill assessed every shift with assessment. General color and body temperature monitored throughout shift and with vitals. Assess for edema with head to toe assessment. Administer treatments and medications as ordered. Monitor patient's weight.

## 2020-09-14 NOTE — PROGRESS NOTES
Hospitalist Progress Note      PCP: Dylon King MD    Date of Admission: 9/13/2020    Chief Complaint: Palpitations    Hospital Course:   Doing well  No chest pain shortness of breath  Self converted to sinus rhythm  No abdominal pain nausea vomiting        Medications:  Reviewed      Exam:    BP (!) 140/82   Pulse 64   Temp 98.2 °F (36.8 °C) (Temporal)   Resp 16   Ht 6' (1.829 m)   Wt 233 lb 14.5 oz (106.1 kg)   SpO2 95%   BMI 31.72 kg/m²     General appearance: No apparent distress, appears stated age and cooperative. HEENT: Pupils equal, round, and reactive to light. Conjunctivae/corneas clear. Neck: Supple, with full range of motion. No jugular venous distention. Trachea midline. Respiratory:  Normal respiratory effort. Clear to auscultation, bilaterally without RALES/WHEEZES/Rhonchi. Cardiovascular: Regular rate and rhythm with normal S1/S2 without MURMURS, rubs or gallops. Abdomen: Soft, non-tender, non-distended with normal bowel sounds. Musculoskeletal: No clubbing, cyanosis or EDEMA bilaterally. Full range of motion without deformity. Skin: Skin color, texture, turgor normal.  No rashes or lesions. Neurologic:  Neurovascularly intact without any focal sensory/motor deficits. Cranial nerves: II-XII intact, grossly non-focal.        Labs:   Recent Labs     09/13/20 2006   WBC 6.7   HGB 15.2   HCT 44.8        Recent Labs     09/13/20 2006 09/14/20  0530    139   K 4.2 4.3    105   CO2 23 25   BUN 15 15   CREATININE 0.9 0.7*   CALCIUM 9.6 9.8   PHOS  --  3.1     No results for input(s): AST, ALT, BILIDIR, BILITOT, ALKPHOS in the last 72 hours.   Recent Labs     09/13/20 2005   INR 1.29*     Recent Labs     09/13/20 2006   [address] <0.01       Urinalysis:      Lab Results   Component Value Date    NITRU Negative 11/21/2016    SPECGRAV 1.010 11/21/2016    GLUCOSEU Negative 11/21/2016       Radiology:  XR CHEST (2 VW)   Final Result   No acute cardiopulmonary disease.              Assessment/Plan:    Active Hospital Problems    Diagnosis Date Noted    Atrial flutter with rapid ventricular response ([facility] [I48.92] 09/13/2020    Atrial fibrillation with RVR ([facility]) [I48.91] 08/18/2020       Acute Medical Issues Being Addressed:    59-year-old gentleman admitted to the hospital with palpitation    Atrial flutter with rapid ventricular response on background of known chronic atrial fibrillation  Was started on diltiazem drip  Self converted to sinus rhythm  Continue oral Cardizem 180 mg daily and metoprolol 100 mg twice daily  Anticoagulated with Xarelto  Await cardiology evaluation particularly electrophysiology to see if they want to consider any kind of ablation inpatient versus outpatient  I did not see any obvious exacerbating factors like infection or major electrolyte abnormalities  Per cardiology suspected untreated sleep apnea  Will need outpatient sleep study  Echocardiogram showed normal ejection fraction    Known Parkinson's disease can  Continue home medications          DVT Prophylaxis: on full anticoagulation  Diet: DIET CARDIAC;  Code Status: Full Code      Dispo - once acute medical processes have resolved    Edith Bobby MD

## 2020-09-14 NOTE — ED NOTES
Pt alert and oriented x4. Pt c/o heart palpitations that started while watching TV earlier this afternoon. Pt states has hx of same thing. Pt denies any dizziness or sob at this time. Pt states was recently cardioverted. Pt with HR between 115-155 bmp per continuous cardiac monitor. Lung sounds clear and equal bilaterally. Pt denies any cp at this time. IV established. Blood work collected and sent to lab. MONTSE Cleary Res at bedside to assess pt. Will continue to monitor.       Rosanna Shi RN  09/13/20 2012

## 2020-09-14 NOTE — CONSULTS
(10/29/2007); back surgery (1984); Toe Surgery; Salivary gland surgery; Carpal tunnel release (Right, 11/19/2013); Carpal tunnel release (Left, 12-10-13); Atrial ablation surgery (2/25/16); Cardioversion (4/6/16); Lumbar spine surgery (1984); Ankle surgery (2015); Total hip arthroplasty (Right, 2016); Ankle surgery (Left); and Colonoscopy (N/A, 7/30/2020). Social History:   reports that he quit smoking about 24 years ago. He has a 15.00 pack-year smoking history. He has never used smokeless tobacco. He reports current alcohol use of about 20.0 standard drinks of alcohol per week. He reports previous drug use. Drug: Marijuana. Family History:  family history includes Alcohol Abuse in his father; High Blood Pressure in his mother; High Cholesterol in his mother; Other in an other family member. Home Medications:  Were reviewed and are listed in nursing record. and/or listed below  Prior to Admission medications    Medication Sig Start Date End Date Taking? Authorizing Provider   rivaroxaban 15 & 20 MG Starter Pack Take as directed on package.  8/20/20  Yes Yee Vogel APRN - CNP   fexofenadine (ALLEGRA) 180 MG tablet Take 180 mg by mouth daily   Yes Historical Provider, MD   carbidopa-levodopa (SINEMET)  MG per tablet Take 3 tablets by mouth 4 times daily  Patient taking differently: Take 3 tablets by mouth 4 times daily Pt takes at [phone] 2/6/20  Yes EMILIA Sheehan - CNP   diltiazem (CARDIZEM CD) 120 MG extended release capsule Take 1 capsule by mouth daily 2/5/20  Yes Keyla Carpenter MD   metoprolol (LOPRESSOR) 100 MG tablet TAKE 1 TABLET BY MOUTH TWICE A DAY 6/16/19  Yes Historical Provider, MD   selegiline (ELDEPRYL) 5 MG capsule  7/14/20   Historical Provider, MD        Current Medications:  Current Facility-Administered Medications   Medication Dose Route Frequency Provider Last Rate Last Dose    dilTIAZem (CARDIZEM CD) extended release capsule 180 mg  180 mg Oral Daily Randall Cranker, MD   180 mg at 09/14/20 0820    carbidopa-levodopa (SINEMET)  MG per tablet 3 tablet  3 tablet Oral 4x Daily Randall Cranker, MD   3 tablet at 09/14/20 0820    cetirizine (ZYRTEC) tablet 10 mg  10 mg Oral Daily Randall Cranker, MD   10 mg at 09/14/20 0820    metoprolol tartrate (LOPRESSOR) tablet 100 mg  100 mg Oral BID Randall Cranker, MD   100 mg at 09/14/20 0820    sodium chloride flush 0.9 % injection 10 mL  10 mL Intravenous 2 times per day Randall Cranker, MD   10 mL at 09/14/20 0491    sodium chloride flush 0.9 % injection 10 mL  10 mL Intravenous PRN Randall Cranker, MD        acetaminophen (TYLENOL) tablet 650 mg  650 mg Oral Q6H PRN Randall Cranker, MD        Or    acetaminophen (TYLENOL) suppository 650 mg  650 mg Rectal Q6H PRN Randall Cranker, MD        polyethylene glycol (GLYCOLAX) packet 17 g  17 g Oral Daily PRN Randall Cranker, MD        promethazine (PHENERGAN) tablet 12.5 mg  12.5 mg Oral Q6H PRN Randall Cranker, MD        Or    ondansetron (ZOFRAN) injection 4 mg  4 mg Intravenous Q6H PRN Randall Cranker, MD        rivaroxaban (XARELTO) tablet 20 mg  20 mg Oral Daily Randall Cranker, MD   20 mg at 09/14/20 0010        Allergies:  Patient has no known allergies. Review of Systems:     · Constitutional: there has been no unanticipated weight loss. There's been no change in energy level, sleep pattern, or activity level. · Eyes: No visual changes or diplopia. No scleral icterus. · ENT: No Headaches, hearing loss or vertigo. No mouth sores or sore throat. · Cardiovascular: + palpitations, + dizziness, no chest pain or pressure   · Respiratory: No cough or wheezing, no sputum production. No hematemesis. · Gastrointestinal: No abdominal pain, appetite loss, blood in stools. No change in bowel or bladder habits. · Genitourinary: No dysuria, trouble voiding, or hematuria.   · Musculoskeletal:  No gait disturbance, chronic left ankle pain  · Integumentary: No rash or pruritis. · Neurological: No headache, diplopia, change in muscle strength, numbness or tingling. No change in gait, balance, coordination, mood, affect, memory, mentation, behavior. · Psychiatric: No anxiety, no depression. · Endocrine: No malaise, fatigue or temperature intolerance. No excessive thirst, fluid intake, or urination. No tremor. · Hematologic/Lymphatic: No abnormal bruising or bleeding, blood clots or swollen lymph nodes. · Allergic/Immunologic: No nasal congestion or hives. ·     Physical Examination:    Vitals:    09/14/20 0800   BP: 128/89   Pulse: 65   Resp: 14   Temp: 97.6 °F (36.4 °C)   SpO2: 97%    Weight: 233 lb 14.5 oz (106.1 kg)         General Appearance:  Alert, cooperative, no distress, appears stated age, +tan   Head:  Normocephalic, without obvious abnormality, atraumatic   Eyes:  PERRL, conjunctiva/corneas clear       Nose: Nares normal, no drainage or sinus tenderness   Throat: Lips, mucosa, and tongue normal   Neck: Supple, symmetrical, trachea midline, no adenopathy, thyroid: not enlarged, symmetric, no tenderness/mass/nodules, no carotid bruit or JVD       Lungs:   Clear to auscultation bilaterally, respirations unlabored   Chest Wall:  No tenderness or deformity   Heart:  Regular rate and rhythm, S1, S2 normal, no murmur, rub or gallop   Abdomen:   Soft, non-tender, bowel sounds active all four quadrants,  no masses, no organomegaly           Extremities: Extremities normal, atraumatic, no cyanosis or edema.  Prior surgical scar of left chest   Pulses: 2+ and symmetric   Skin: Skin color, texture, turgor normal, no rashes or lesions   Pysch: Normal mood and affect   Neurologic: Normal gross motor and sensory exam.         Labs  CBC:   Lab Results   Component Value Date    WBC 6.7 09/13/2020    RBC 4.54 09/13/2020    HGB 15.2 09/13/2020    HCT 44.8 09/13/2020    MCV 98.9 09/13/2020    RDW 14.0 09/13/2020     09/13/2020     CMP:    Lab Results   Component Value Date     09/14/2020    K 4.3 09/14/2020    K 4.5 08/20/2020     09/14/2020    CO2 25 09/14/2020    BUN 15 09/14/2020    CREATININE 0.7 09/14/2020    GFRAA >60 09/14/2020    GFRAA >60 03/19/2013    AGRATIO 2.0 06/17/2020    LABGLOM >60 09/14/2020    GLUCOSE 103 09/14/2020    PROT 7.0 06/17/2020    PROT 7.8 03/19/2013    CALCIUM 9.8 09/14/2020    BILITOT 0.7 06/17/2020    ALKPHOS 113 06/17/2020    AST 14 06/17/2020    ALT 7 06/17/2020     PT/INR:  No results found for: PTINR  Lab Results   Component Value Date    TROPONINI <0.01 09/13/2020       EKG:  I have reviewed EKG with the following interpretation:  Impression:  Sinus rhythm, normal intevals    Cath: none  Stress: none   ECHO 2013 Preserved LV systolic function with EF of 55%       Reversal of E/A inflow velocities across the mitral valve suggesting    impaired left ventricular relaxation.       Mild mitral regurgitation is present.       Mildly dilated left atrium.       MASSIMO 8/2020:   No thrombus noted   Overall left ventricular function is normal.   Mitral valve is structurally normal.   Mild mitral regurgitation is present. There is no evidence of mass or thrombus in the left atrium or appendage. Lipomatous hypertrophy of septum. The right atrium is normal in size. All testing and labs listed below were personally reviewed. Moderate complexity, Moderate risk   Assessment  Patient Active Problem List   Diagnosis    Essential hypertension    KATELIN (obstructive sleep apnea)    PAF (paroxysmal atrial fibrillation) (HCC)    Macrocytosis without anemia    Disabling essential tremor    Adjustment disorder with anxious mood    Moderate episode of recurrent major depressive disorder ([address]    Obesity (BMI 30.0-34. 9)    Parkinson's disease ([address]    Syncope and collapse    Atrial flutter with rapid ventricular response ([address]       Chest xray personally reviewed  Old records and medications personally performing procedures. This includes but not limited to vital sign monitoring, telemetry monitoring, continuous pulse oximety, IV medication, clinical response to the IV medications, documentation time , consultation time, interpretation of lab data, review of nursing notes and old record review. All questions and concerns were addressed to the patient/family. Alternatives to my treatment were discussed. The note was completed using EMR. Every effort was made to ensure accuracy; however, inadvertent computerized transcription errors may be present.   Lisa Neal MD 9/14/2020 8:41 AM

## 2020-09-15 NOTE — DISCHARGE SUMMARY
Patient: Terrell Ramirez     Gender: male  : 1960   Age: 63 y.o. MRN: [id number]    Admitting Physician: Kendall Marvin MD  Discharge Physician: Tu Rao MD     Code Status: full    Admit Date: 2020   Discharge Date: 2020    Disposition:  Home    Discharge Diagnoses:  Atrial fibrillation with rapid ventricular rate self converted to sinus rhythm    There are no active hospital problems to display for this patient. Follow-up appointments:  one week    Outpatient to do list: none    Condition at Discharge:  Stable    Hospital Course:   79-year-old admitted to the hospital with some palpitations noted to have atrial fibrillation with rapid ventricular rate  Was started on a Cardizem drip however self converted to sinus rhythm  His Cardizem oral dose was increased  Seen by electrophysiology who will follow-up as an outpatient for possible ablation  Patient has been doing well  He is on full anticoagulation    Discharge Medications:   Discharge Medication List as of 2020  4:10 PM        Discharge Medication List as of 2020  4:10 PM      CONTINUE these medications which have CHANGED    Details   dilTIAZem (CARDIZEM CD) 180 MG extended release capsule Take 1 capsule by mouth daily, Disp-30 capsule,R-3Normal           Discharge Medication List as of 2020  4:10 PM      CONTINUE these medications which have NOT CHANGED    Details   selegiline (ELDEPRYL) 5 MG capsule Historical Med      rivaroxaban 15 & 20 MG Starter Pack Take as directed on package. , Disp-1 Package,R-0Normal      fexofenadine (ALLEGRA) 180 MG tablet Take 180 mg by mouth dailyHistorical Med      carbidopa-levodopa (SINEMET)  MG per tablet Take 3 tablets by mouth 4 times daily, Disp-120 tablet, R-1Adjust Sig      metoprolol (LOPRESSOR) 100 MG tablet TAKE 1 TABLET BY MOUTH TWICE A DAY, R-3Historical Med           Discharge Medication List as of 2020  4:10 PM          Discharge ROS:  A complete review of systems was asked and negative Discharge Exam:    BP (!) 140/82   Pulse 64   Temp 98.2 °F (36.8 °C) (Temporal)   Resp 16   Ht 6' (1.829 m)   Wt 233 lb 14.5 oz (106.1 kg)   SpO2 95%   BMI 31.72 kg/m²   General appearance:  NAD  HEENT:   Normal cephalic, atraumatic, moist mucous membranes, no oropharyngeal erythema or exudate  Heart[de-identified] Normal s1/s2, RRR, no murmurs, gallops, or rubs. no leg edema  Lungs:  ormal respiratory effort. Clear to auscultation, bilaterally without Rales/Wheezes/Rhonchi. Abdomen: Soft, non-tender, non-distended, bowel sounds present, no masses  Musculoskeletal:  No clubbing, no cyanosis, no edema  Neurologic:  Neurovascularly intact without any focal sensory/motor deficits. Cranial nerves: II-XII intact, grossly non-focal.    Labs: For convenience and continuity at follow-up the following most recent labs are provided:    Lab Results   Component Value Date    WBC 6.7 09/13/2020    HGB 15.2 09/13/2020    HCT 44.8 09/13/2020    MCV 98.9 09/13/2020     09/13/2020     09/14/2020    K 4.3 09/14/2020    K 4.5 08/20/2020     09/14/2020    CO2 25 09/14/2020    BUN 15 09/14/2020    CREATININE 0.7 09/14/2020    CALCIUM 9.8 09/14/2020    PHOS 3.1 09/14/2020    ALKPHOS 113 06/17/2020    ALT 7 06/17/2020    AST 14 06/17/2020    BILITOT 0.7 06/17/2020    LABALBU 4.1 09/14/2020    LDLCALC 86 06/17/2020    TRIG 166 06/17/2020     Lab Results   Component Value Date    INR 1.29 (H) 09/13/2020    INR 1.02 08/18/2020    INR 0.97 02/25/2016           The patient was seen and examined on day of discharge and this discharge summary is in conjunction with any daily progress note from day of discharge. Time Spent on discharge is 45 minutes  in the examination, evaluation, counseling and review of medications and discharge plan.       Note that greater  than 30 minutes was spent in preparing discharge papers, discussing discharge with patient, medication review, etc.       Signed:    Gino Celis Jimmy Aj MD   9/15/2020      Thank you Richard Albarado MD for the opportunity to be involved in this patient's care.  If you have any questions or concerns please feel free to contact me

## 2020-09-16 LAB
EKG ATRIAL RATE: 375 BPM
EKG ATRIAL RATE: 75 BPM
EKG DIAGNOSIS: NORMAL
EKG DIAGNOSIS: NORMAL
EKG P AXIS: 14 DEGREES
EKG P-R INTERVAL: 190 MS
EKG Q-T INTERVAL: 314 MS
EKG Q-T INTERVAL: 386 MS
EKG QRS DURATION: 86 MS
EKG QRS DURATION: 86 MS
EKG QTC CALCULATION (BAZETT): 431 MS
EKG QTC CALCULATION (BAZETT): 434 MS
EKG R AXIS: 2 DEGREES
EKG R AXIS: 22 DEGREES
EKG T AXIS: 34 DEGREES
EKG T AXIS: 36 DEGREES
EKG VENTRICULAR RATE: 115 BPM
EKG VENTRICULAR RATE: 75 BPM

## 2020-09-16 PROCEDURE — 93010 ELECTROCARDIOGRAM REPORT: CPT | Performed by: INTERNAL MEDICINE

## 2020-10-01 NOTE — PROGRESS NOTES
[facility]  Electrophysiology Followup      CC: Atrial fibrillation  History of Present Illness:  Evelin Kaminski is 63 y.o. male history of symptomatic persistent atrial fibrillation. He also has history of prior atrial fibrillation/flutter ablation in late 90's at  by Dr. Pee Grigsby. 2/25/16, he had PVI and CAFE ablation with restoration of sinus rhythm with ablation. He returned with recurrent atrial fibrillation after being off Propafenone and was cardioverted on 4/6/16. He presented to [facility] ED on 9/2020 with complaints of sudden palpitations while watching TV. Interval history:    Ignacio ca presents to the office in follow up. He is doing well today. Has not had an episode of afib since his hospitalization 9/2020. He attests to quitting drinking alcohol 4 weeks ago. Assessment and Plan:  Atrial fibrillation/ flutter    We discussed treatment options including antiarrhythmics, rate control with anticoagulation, and ablation. We discussed progressive nature of atrial fibrillation. Treatment success decreases when AF becomes persistent and last more than 6 months. Antiarrhythmic therapy, side effects, benefits and alternative discussed. Atrial fibrillation ablation procedure was discussed. We discussed the need for repeat procedure. On average patients may need more than one ablation procedure. Risks associated with ablation include but not limited to allergic reaction to the medications, pain, bleeding, infection, nerve injury, injury to diaphragm(breathing muscle), pulmonary embolus(blood clot in lungs), deep vein blood clot, pneumothorax, hemothorax, acute renal failure, cardiac perforation,  tamponade, need for emergent surgery (open heart), permanent pacemaker, pulmonary vein stenosis, left atrial to esophageal fistula, stroke, myocardial infarction and death. Difference between atrial fibrillation and atrial flutter discussed and treatment discussed.    Patient verbalized understanding of procedure, risks and benefits and wishes to proceed with ablation.     -He is scheduled for an ablation 12/4/2020    -S/p PVI and CFAE ablation with restoration of sinus rhythm on 2/25/16       HTN  Vitals:    10/06/20 1137   BP: 121/78   Pulse: 65     -Home BP monitoring encourage with a BP goal <130/80        KATELIN:   -intolerant of CPAP  - Refuses to wear  -Encouraged to Lose weight     Obesity  Body mass index is 31.33 kg/m². - Excessive weight is complicating assessment and treatment. It is placing patient at risk for multiple co-morbidities as well as early death and contributing to the patient's presentation. - discussed weight management with diet and exercise           - The patient is counseled to follow a low salt diet to assure blood pressure remains controlled for cardiovascular risk factor modification.   - The patient is counseled to avoid excess caffeine, and energy drinks as this may exacerbated ectopy and arrhythmia. - The patient is counseled to get regular exercise 3-5 times per week to control cardiovascular risk factors. - The patient is counseled to lose weight to control cardiovascular risk factors. Diagnostic testing:      EKG 10/8/20  Sinus rhythm    Echo:  2/25/16  Normal left ventricle size, wall thickness and systolic function with an   estimated ejection fraction of 55%.   Mild mitral regurgitation is present.  Vibha Collar is no evidence of mass or thrombus in the left atrium or appendage.   The aortic root is mildly dilated. 3/2013  Preserved LV systolic function with EF of 55%  Reversal of E/A inflow velocities across the mitral valve suggesting impaired left ventricular relaxation. Mild mitral regurgitation is present. Mildly dilated left atrium.       Past Medical History:   has a past medical history of Anxiety, Atrial fibrillation ([address] Atrial flutter ([address] Cervical arthritis, CTS (carpal tunnel syndrome), Essential tremor, Hypertension, KATELIN (obstructive sleep apnea), Osteoarthritis of back, Osteoarthritis of right hip, Parkinson disease ([facility] [address] S/P ablation of atrial fibrillation, Sinusitis, and Trigger finger. Surgical History:   has a past surgical history that includes Atrial ablation surgery (1998); Colonoscopy (10/29/2007); back surgery (1984); Toe Surgery; Salivary gland surgery; Carpal tunnel release (Right, 11/19/2013); Carpal tunnel release (Left, 12-10-13); Atrial ablation surgery (2/25/16); Cardioversion (4/6/16); Lumbar spine surgery (1984); Ankle surgery (2015); Total hip arthroplasty (Right, 2016); Ankle surgery (Left); and Colonoscopy (N/A, 7/30/2020). Social History:   reports that he quit smoking about 24 years ago. He has a 15.00 pack-year smoking history. He has never used smokeless tobacco. He reports previous alcohol use of about 20.0 standard drinks of alcohol per week. He reports previous drug use. Drug: Marijuana. Family History:  family history includes Alcohol Abuse in his father; High Blood Pressure in his mother; High Cholesterol in his mother; Other in an other family member. Home Medications:  Prior to Admission medications    Medication Sig Start Date End Date Taking? Authorizing Provider   rivaroxaban (XARELTO) 20 MG TABS tablet Take 1 tablet by mouth daily (with breakfast) 10/6/20  Yes Johnna Richardson MD   dilTIAZem (CARDIZEM CD) 180 MG extended release capsule Take 1 capsule by mouth daily 10/6/20  Yes Johnna Richardson MD   fexofenadine (ALLEGRA) 180 MG tablet Take 180 mg by mouth daily   Yes Historical Provider, MD   carbidopa-levodopa (SINEMET)  MG per tablet Take 3 tablets by mouth 4 times daily  Patient taking differently: Take 3 tablets by mouth 4 times daily Pt takes at [id number] 2/6/20  Yes EMILIA Ferrera - CNP   metoprolol (LOPRESSOR) 100 MG tablet TAKE 1 TABLET BY MOUTH TWICE A DAY 10/8/20   Jarrett Habermann, MD      Allergies:  Patient has no known allergies.      DATA:    Lab transcribed using voice recognition software. Every effort was made to ensure accuracy, however, inadvertent computerized transcription errors may be present. Matthew Daly MD, MPH  [place] [address]   Office: [phone]    Scribe attestation: This note was scribed in the presence of Matthew Daly MD by Claudio Jeter RN  Physician Attestation: I, Dr. Matthew Daly, confirm that the scribe's documentation has been prepared under my direction and personally reviewed by me in its entirety. I also confirm that the note above accurately reflects all work, treatment, procedures, and medical decision making performed by me.

## 2020-10-06 ENCOUNTER — OFFICE VISIT (OUTPATIENT)
Dept: CARDIOLOGY CLINIC | Age: 60
End: 2020-10-06
Payer: MEDICARE

## 2020-10-06 VITALS
HEART RATE: 65 BPM | HEIGHT: 72 IN | DIASTOLIC BLOOD PRESSURE: 78 MMHG | SYSTOLIC BLOOD PRESSURE: 121 MMHG | WEIGHT: 231 LBS | BODY MASS INDEX: 31.29 KG/M2

## 2020-10-06 PROCEDURE — G8427 DOCREV CUR MEDS BY ELIG CLIN: HCPCS | Performed by: INTERNAL MEDICINE

## 2020-10-06 PROCEDURE — 99214 OFFICE O/P EST MOD 30 MIN: CPT | Performed by: INTERNAL MEDICINE

## 2020-10-06 PROCEDURE — 1036F TOBACCO NON-USER: CPT | Performed by: INTERNAL MEDICINE

## 2020-10-06 PROCEDURE — G8417 CALC BMI ABV UP PARAM F/U: HCPCS | Performed by: INTERNAL MEDICINE

## 2020-10-06 PROCEDURE — 93000 ELECTROCARDIOGRAM COMPLETE: CPT | Performed by: INTERNAL MEDICINE

## 2020-10-06 PROCEDURE — G8484 FLU IMMUNIZE NO ADMIN: HCPCS | Performed by: INTERNAL MEDICINE

## 2020-10-06 PROCEDURE — 3017F COLORECTAL CA SCREEN DOC REV: CPT | Performed by: INTERNAL MEDICINE

## 2020-10-06 RX ORDER — DILTIAZEM HYDROCHLORIDE 180 MG/1
180 CAPSULE, COATED, EXTENDED RELEASE ORAL DAILY
Qty: 90 CAPSULE | Refills: 3 | Status: SHIPPED | OUTPATIENT
Start: 2020-10-06 | End: 2021-09-30

## 2020-11-30 ENCOUNTER — OFFICE VISIT (OUTPATIENT)
Dept: PRIMARY CARE CLINIC | Age: 60
End: 2020-11-30
Payer: MEDICARE

## 2020-11-30 PROCEDURE — G8417 CALC BMI ABV UP PARAM F/U: HCPCS | Performed by: NURSE PRACTITIONER

## 2020-11-30 PROCEDURE — G8428 CUR MEDS NOT DOCUMENT: HCPCS | Performed by: NURSE PRACTITIONER

## 2020-11-30 PROCEDURE — 99211 OFF/OP EST MAY X REQ PHY/QHP: CPT | Performed by: NURSE PRACTITIONER

## 2020-11-30 NOTE — PATIENT INSTRUCTIONS

## 2020-11-30 NOTE — PROGRESS NOTES
Karri Rivera received a viral test for COVID-19. They were educated on isolation and quarantine as appropriate. For any symptoms, they were directed to seek care from their PCP, given contact information to establish with a doctor, directed to an urgent care or the emergency room.

## 2020-12-01 LAB — SARS-COV-2: DETECTED

## 2020-12-02 NOTE — RESULT ENCOUNTER NOTE
Left Message on phone     Your test came back detected/positive for Covid-19. What happens if I have a positive test?  If you have symptoms:  Isolate until all three of these things are true: 1) your symptoms are better, 2) it has been 10 days since you first felt sick, and 3) you have had no fever for at least 24 hours without using medicine that lowers fever. Drink plenty of fluids and eat when you can. You may take medicine for pain or fever if you need to. Rest as much as you can. If you do not have symptoms:  Stay home for 10 days after the date you were tested. If you develop symptoms during those 10 days, stay home until all three of these things are true: 1) your symptoms are better, 2) it has been 10 days since you first felt sick, and 3) you have had no fever for at least 24 hours without using medicine that lowers fever. Follow care instructions from your doctor or other healthcare provider. Seek emergency medical care immediately if you have trouble breathing, persistent pain or pressure in the chest, new confusion, inability to wake or stay awake, or bluish lips or face. Someone from the health department (case investigator or contact tracer) may reach out to you to check on your health and ask about other people you have been around or where you've spent time while you may have been able to spread COVID-19 to others. This person's role is strictly to map the virus to help identify people who may have been exposed to the virus and prevent its spread. The local health department will also provide guidance on how to stay safely at home to avoid spreading illness. Detected results can happen for months and you are not considered contagious. No retest is necessary.

## 2020-12-14 ENCOUNTER — OFFICE VISIT (OUTPATIENT)
Dept: PRIMARY CARE CLINIC | Age: 60
End: 2020-12-14
Payer: MEDICARE

## 2020-12-14 PROCEDURE — G8428 CUR MEDS NOT DOCUMENT: HCPCS | Performed by: NURSE PRACTITIONER

## 2020-12-14 PROCEDURE — G8417 CALC BMI ABV UP PARAM F/U: HCPCS | Performed by: NURSE PRACTITIONER

## 2020-12-14 PROCEDURE — 99211 OFF/OP EST MAY X REQ PHY/QHP: CPT | Performed by: NURSE PRACTITIONER

## 2020-12-14 NOTE — PATIENT INSTRUCTIONS

## 2020-12-15 LAB — SARS-COV-2, NAA: NOT DETECTED

## 2020-12-18 ENCOUNTER — OFFICE VISIT (OUTPATIENT)
Dept: INTERNAL MEDICINE CLINIC | Age: 60
End: 2020-12-18
Payer: MEDICARE

## 2020-12-18 VITALS
BODY MASS INDEX: 32.37 KG/M2 | SYSTOLIC BLOOD PRESSURE: 134 MMHG | OXYGEN SATURATION: 99 % | HEART RATE: 55 BPM | WEIGHT: 239 LBS | HEIGHT: 72 IN | TEMPERATURE: 97.1 F | DIASTOLIC BLOOD PRESSURE: 72 MMHG

## 2020-12-18 DIAGNOSIS — U07.1 COVID-19: ICD-10-CM

## 2020-12-18 LAB — SARS-COV-2 ANTIBODY, TOTAL: POSITIVE

## 2020-12-18 PROCEDURE — 3017F COLORECTAL CA SCREEN DOC REV: CPT | Performed by: INTERNAL MEDICINE

## 2020-12-18 PROCEDURE — G8484 FLU IMMUNIZE NO ADMIN: HCPCS | Performed by: INTERNAL MEDICINE

## 2020-12-18 PROCEDURE — G0438 PPPS, INITIAL VISIT: HCPCS | Performed by: INTERNAL MEDICINE

## 2020-12-18 PROCEDURE — 99497 ADVNCD CARE PLAN 30 MIN: CPT | Performed by: INTERNAL MEDICINE

## 2020-12-18 RX ORDER — METOPROLOL TARTRATE 100 MG/1
TABLET ORAL
Qty: 60 TABLET | Refills: 5 | Status: SHIPPED | OUTPATIENT
Start: 2020-12-18 | End: 2020-12-18

## 2020-12-18 ASSESSMENT — PATIENT HEALTH QUESTIONNAIRE - PHQ9
SUM OF ALL RESPONSES TO PHQ QUESTIONS 1-9: 0
2. FEELING DOWN, DEPRESSED OR HOPELESS: 0
SUM OF ALL RESPONSES TO PHQ QUESTIONS 1-9: 0
SUM OF ALL RESPONSES TO PHQ9 QUESTIONS 1 & 2: 0
1. LITTLE INTEREST OR PLEASURE IN DOING THINGS: 0
SUM OF ALL RESPONSES TO PHQ QUESTIONS 1-9: 0

## 2020-12-18 ASSESSMENT — LIFESTYLE VARIABLES: HOW OFTEN DO YOU HAVE A DRINK CONTAINING ALCOHOL: 0

## 2020-12-18 NOTE — PATIENT INSTRUCTIONS
Personalized Preventive Plan for Manuel Hardy - 12/18/2020  Medicare offers a range of preventive health benefits. Some of the tests and screenings are paid in full while other may be subject to a deductible, co-insurance, and/or copay. Some of these benefits include a comprehensive review of your medical history including lifestyle, illnesses that may run in your family, and various assessments and screenings as appropriate. After reviewing your medical record and screening and assessments performed today your provider may have ordered immunizations, labs, imaging, and/or referrals for you. A list of these orders (if applicable) as well as your Preventive Care list are included within your After Visit Summary for your review. Other Preventive Recommendations:    · A preventive eye exam performed by an eye specialist is recommended every 1-2 years to screen for glaucoma; cataracts, macular degeneration, and other eye disorders. · A preventive dental visit is recommended every 6 months. · Try to get at least 150 minutes of exercise per week or 10,000 steps per day on a pedometer . · Order or download the FREE \"Exercise & Physical Activity: Your Everyday Guide\" from [employer]. Call 7-136.789.4621 or search [employer] online. · You need 4609-6694 mg of calcium and 5963-6223 IU of vitamin D per day. It is possible to meet your calcium requirement with diet alone, but a vitamin D supplement is usually necessary to meet this goal.  · When exposed to the sun, use a sunscreen that protects against both UVA and UVB radiation with an SPF of 30 or greater. Reapply every 2 to 3 hours or after sweating, drying off with a towel, or swimming. · Always wear a seat belt when traveling in a car. Always wear a helmet when riding a bicycle or motorcycle.

## 2020-12-18 NOTE — PROGRESS NOTES
Medicare Annual Wellness Visit  Name: Te Arce Date: 2020   MRN: [id number] Sex: Male   Age: 63 y.o. Ethnicity: Non-/Non    : 1960 Race: Ansley Rivera is here for Medicare AWV    Screenings for behavioral, psychosocial and functional/safety risks, and cognitive dysfunction are all negative except as indicated below. These results, as well as other patient data from the [address] [facility] [address] [facility] [address] form, are documented in Flowsheets linked to this Encounter. No Known Allergies    Prior to Visit Medications    Medication Sig Taking?  Authorizing Provider   metoprolol (LOPRESSOR) 100 MG tablet TAKE 1 TABLET BY MOUTH TWICE A DAY Yes Berenice Motta MD   rivaroxaban (XARELTO) 20 MG TABS tablet Take 1 tablet by mouth daily (with breakfast) Yes Abdullahi Johnson MD   dilTIAZem (CARDIZEM CD) 180 MG extended release capsule Take 1 capsule by mouth daily Yes Abdullahi Johnson MD   fexofenadine (ALLEGRA) 180 MG tablet Take 180 mg by mouth daily Yes Historical Provider, MD   carbidopa-levodopa (SINEMET)  MG per tablet Take 3 tablets by mouth 4 times daily  Patient taking differently: Take 3 tablets by mouth 4 times daily Pt takes at [id number] Yes EMILIA Santamaria - CNP       Past Medical History:   Diagnosis Date    Anxiety     Atrial fibrillation ([address]     Atrial flutter ([address]     Cervical arthritis 3/30/2012    CTS (carpal tunnel syndrome) 3/13/2013    Essential tremor 2017    Hypertension     KATELIN (obstructive sleep apnea)     DOES NOT USE C-PAP    Osteoarthritis of back 2014    Osteoarthritis of right hip 2014    Parkinson disease [address] [address]     S/P ablation of atrial fibrillation 2016    Radiofrequency ablation of atrial fibrillation and pulmonary veins isolation Additional ablation of complex atrial fractionated electrogram     Sinusitis     Trigger finger 12/10/2014       Past Surgical History:   Procedure Laterality Date    ANKLE SURGERY  2015    ANKLE SURGERY Left     ATRIAL ABLATION SURGERY  1998    ATRIAL ABLATION SURGERY  2/25/16    RFCA of AF and PVI, additional ablation complex atrial fractioned electrogram    BACK SURGERY  1984    lumbar patino    CARDIOVERSION  4/6/16    CARPAL TUNNEL RELEASE Right 11/19/2013    RIGHT CARPAL TUNNEL RELEASE      CARPAL TUNNEL RELEASE Left 12-10-13     CARPAL TUNNEL RELEASE                COLONOSCOPY  10/29/2007    COLONOSCOPY N/A 7/30/2020    COLONOSCOPY POLYPECTOMY SNARE/COLD BIOPSY performed by Carson Desouza MD at [address] [facility]    SALIVARY GLAND SURGERY      TOE SURGERY      TOTAL HIP ARTHROPLASTY Right 2016    right hip replacement       Family History   Problem Relation Age of Onset    High Blood Pressure Mother     High Cholesterol Mother     Alcohol Abuse Father     Other Other         afib    Heart Disease Neg Hx     Mental Illness Neg Hx        CareTeam (Including outside providers/suppliers regularly involved in providing care):   Patient Care Team:  Yaritza Bernstein MD as PCP - Rodri Jordan MD as PCP - [facility] Empaneled Provider  Kash Erazo MD as Consulting Physician (Electrophysiology)  Thao Arauz MD as Consulting Physician (Electrophysiology)    Wt Readings from Last 3 Encounters:   12/18/20 239 lb (108.4 kg)   10/06/20 231 lb (104.8 kg)   09/13/20 233 lb 14.5 oz (106.1 kg)     Vitals:    12/18/20 1051   BP: 134/72   Pulse: 55   Temp: 97.1 °F (36.2 °C)   TempSrc: Infrared   SpO2: 99%   Weight: 239 lb (108.4 kg)   Height: 6' (1.829 m)     Body mass index is 32.41 kg/m². Based upon direct observation of the patient, evaluation of cognition reveals recent and remote memory intact. Physical Exam    Patient's complete Health Risk Assessment and screening values have been reviewed and are found in Flowsheets.  The following problems were reviewed today and where indicated follow up appointments were made and/or referrals ordered. Positive Risk Factor Screenings with Interventions:          General Health and ACP:  General  In general, how would you say your health is?: Fair  In the past 7 days, have you experienced any of the following? New or Increased Pain, New or Increased Fatigue, Loneliness, Social Isolation, Stress or Anger?: (!) New or Increased Pain, New or Increased Fatigue, Stress, Anger  Do you get the social and emotional support that you need?: Yes  Do you have a Living Will?: Yes  Advance Directives     Power of  Living Will ACP-Advance Directive ACP-Power of     Not on File Filed on 10/29/15 Filed Not on File      General Health Risk Interventions:  · Pain issues: patient advised to follow-up in this office for further evaluation and treatment within 3 month(s)    Health Habits/Nutrition:  Health Habits/Nutrition  Do you exercise for at least 20 minutes 2-3 times per week?: Yes  Have you lost any weight without trying in the past 3 months?: No  Do you eat fewer than 2 meals per day?: No  Have you seen a dentist within the past year?: Yes  Body mass index: (!) 32.41  Health Habits/Nutrition Interventions:  · Inadequate physical activity:  patient is not ready to increase his/her physical activity level at this time, he is scheduled to have a cardiac ablation in the next few months.     Hearing/Vision:  No exam data present  Hearing/Vision  Do you or your family notice any trouble with your hearing?: (!) Yes  Do you have difficulty driving, watching TV, or doing any of your daily activities because of your eyesight?: No  Have you had an eye exam within the past year?: Yes  Hearing/Vision Interventions:  · Hearing concerns:  patient declines any further evaluation/treatment for hearing issues    Safety:  Safety  Do you have working smoke detectors?: Yes  Have all throw rugs been removed or fastened?: Yes  Do you have non-slip mats or surfaces in all bathtubs/showers?: Yes  Do all of your stairways have a railing or banister?: Yes  Are your doorways, halls and stairs free of clutter?: Yes  Do you always fasten your seatbelt when you are in a car?: (!) No  Safety Interventions:  · Home safety tips provided     Personalized Preventive Plan   Current Health Maintenance Status  Immunization History   Administered Date(s) Administered    Influenza Vaccine, unspecified formulation 01/17/2018    Tdap (Boostrix, Adacel) 09/13/2013        Health Maintenance   Topic Date Due    Diabetes screen  08/01/1999    Shingles Vaccine (1 of 2) 08/01/2009    Annual Wellness Visit (AWV)  05/29/2019    Potassium monitoring  09/14/2021    Creatinine monitoring  09/14/2021    DTaP/Tdap/Td vaccine (2 - Td) 09/13/2023    Lipid screen  06/17/2025    Colon cancer screen colonoscopy  07/30/2030    Flu vaccine  Completed    Hepatitis C screen  Completed    HIV screen  Completed    Hepatitis A vaccine  Aged Out    Hepatitis B vaccine  Aged Out    Hib vaccine  Aged Out    Meningococcal (ACWY) vaccine  Aged Out    Pneumococcal 0-64 years Vaccine  Aged Out     Recommendations for angelMD Due: see orders and patient instructions/AVS.  . Recommended screening schedule for the next 5-10 years is provided to the patient in written form: see Patient Micheal Chavira was seen today for medicare awv. Diagnoses and all orders for this visit:    Routine general medical examination at a health care facility    Essential hypertension  Comments:  Chronic, controlled. Mixed hyperlipidemia  Comments:  Chronic, controlled. Parkinsonian tremor (HCC)  Comments:  Chronic, controlled. Obstructive sleep apnea syndrome  Comments:  Chronic, controlled. COVID-19  Comments:  Has had a positive test and negative follow up test with no symptoms. Orders:  -     Covid-19, Antibody;  Future    ACP (advance care planning)  -     [place] ADVANCED CARE PLAN FACE TO [address], [address] [id number]    Other orders  - metoprolol (LOPRESSOR) 100 MG tablet; TAKE 1 TABLET BY MOUTH TWICE A DAY           Wendy Heart MD  FACP

## 2021-01-15 ENCOUNTER — OFFICE VISIT (OUTPATIENT)
Dept: PRIMARY CARE CLINIC | Age: 61
End: 2021-01-15
Payer: MEDICARE

## 2021-01-15 DIAGNOSIS — Z20.828 EXPOSURE TO SARS-ASSOCIATED CORONAVIRUS: Primary | ICD-10-CM

## 2021-01-15 PROCEDURE — G8428 CUR MEDS NOT DOCUMENT: HCPCS | Performed by: NURSE PRACTITIONER

## 2021-01-15 PROCEDURE — 99211 OFF/OP EST MAY X REQ PHY/QHP: CPT | Performed by: NURSE PRACTITIONER

## 2021-01-15 PROCEDURE — G8417 CALC BMI ABV UP PARAM F/U: HCPCS | Performed by: NURSE PRACTITIONER

## 2021-01-15 NOTE — PATIENT INSTRUCTIONS

## 2021-01-16 LAB — SARS-COV-2, NAA: NOT DETECTED

## 2021-01-20 ENCOUNTER — ANESTHESIA EVENT (OUTPATIENT)
Dept: CARDIAC CATH/INVASIVE PROCEDURES | Age: 61
End: 2021-01-20
Payer: MEDICARE

## 2021-01-20 ENCOUNTER — HOSPITAL ENCOUNTER (OUTPATIENT)
Dept: CARDIAC CATH/INVASIVE PROCEDURES | Age: 61
Discharge: HOME | End: 2021-01-20
Attending: INTERNAL MEDICINE | Admitting: INTERNAL MEDICINE
Payer: MEDICARE

## 2021-01-20 ENCOUNTER — ANESTHESIA (OUTPATIENT)
Dept: CARDIAC CATH/INVASIVE PROCEDURES | Age: 61
End: 2021-01-20
Payer: MEDICARE

## 2021-01-20 VITALS
OXYGEN SATURATION: 97 % | SYSTOLIC BLOOD PRESSURE: 152 MMHG | RESPIRATION RATE: 16 BRPM | DIASTOLIC BLOOD PRESSURE: 101 MMHG | TEMPERATURE: 96.4 F

## 2021-01-20 VITALS
HEIGHT: 72 IN | SYSTOLIC BLOOD PRESSURE: 133 MMHG | RESPIRATION RATE: 13 BRPM | HEART RATE: 72 BPM | TEMPERATURE: 97 F | DIASTOLIC BLOOD PRESSURE: 78 MMHG | BODY MASS INDEX: 31.15 KG/M2 | WEIGHT: 230 LBS | OXYGEN SATURATION: 95 %

## 2021-01-20 LAB
A/G RATIO: 1.4 (ref 1.1–2.2)
ABO/RH: NORMAL
ALBUMIN SERPL-MCNC: 4.4 G/DL (ref 3.4–5)
ALP BLD-CCNC: 120 U/L (ref 40–129)
ALT SERPL-CCNC: 9 U/L (ref 10–40)
ANION GAP SERPL CALCULATED.3IONS-SCNC: 11 MMOL/L (ref 3–16)
ANTIBODY SCREEN: NORMAL
AST SERPL-CCNC: 14 U/L (ref 15–37)
BILIRUB SERPL-MCNC: 0.6 MG/DL (ref 0–1)
BUN BLDV-MCNC: 14 MG/DL (ref 7–20)
CALCIUM SERPL-MCNC: 10.1 MG/DL (ref 8.3–10.6)
CHLORIDE BLD-SCNC: 104 MMOL/L (ref 99–110)
CO2: 27 MMOL/L (ref 21–32)
CREAT SERPL-MCNC: 0.7 MG/DL (ref 0.8–1.3)
EKG ATRIAL RATE: 58 BPM
EKG DIAGNOSIS: NORMAL
EKG P AXIS: 40 DEGREES
EKG P-R INTERVAL: 214 MS
EKG Q-T INTERVAL: 410 MS
EKG QRS DURATION: 90 MS
EKG QTC CALCULATION (BAZETT): 402 MS
EKG R AXIS: 17 DEGREES
EKG T AXIS: 33 DEGREES
EKG VENTRICULAR RATE: 58 BPM
GFR AFRICAN AMERICAN: >60
GFR NON-AFRICAN AMERICAN: >60
GLOBULIN: 3.1 G/DL
GLUCOSE BLD-MCNC: 109 MG/DL (ref 70–99)
HCT VFR BLD CALC: 47.2 % (ref 40.5–52.5)
HEMOGLOBIN: 15.5 G/DL (ref 13.5–17.5)
INR BLD: 0.97 (ref 0.86–1.14)
LV EF: 53 %
LVEF MODALITY: NORMAL
MAGNESIUM: 2.1 MG/DL (ref 1.8–2.4)
MCH RBC QN AUTO: 31.5 PG (ref 26–34)
MCHC RBC AUTO-ENTMCNC: 32.8 G/DL (ref 31–36)
MCV RBC AUTO: 95.8 FL (ref 80–100)
PDW BLD-RTO: 14.7 % (ref 12.4–15.4)
PLATELET # BLD: 158 K/UL (ref 135–450)
PMV BLD AUTO: 10.1 FL (ref 5–10.5)
POC ACT LR: 317 SEC
POC ACT LR: 375 SEC
POC ACT LR: 399 SEC
POTASSIUM SERPL-SCNC: 4.4 MMOL/L (ref 3.5–5.1)
PROTHROMBIN TIME: 11.2 SEC (ref 10–13.2)
RBC # BLD: 4.92 M/UL (ref 4.2–5.9)
SODIUM BLD-SCNC: 142 MMOL/L (ref 136–145)
TOTAL PROTEIN: 7.5 G/DL (ref 6.4–8.2)
WBC # BLD: 6.5 K/UL (ref 4–11)

## 2021-01-20 PROCEDURE — 2500000003 HC RX 250 WO HCPCS: Performed by: NURSE ANESTHETIST, CERTIFIED REGISTERED

## 2021-01-20 PROCEDURE — C1894 INTRO/SHEATH, NON-LASER: HCPCS

## 2021-01-20 PROCEDURE — 2580000003 HC RX 258

## 2021-01-20 PROCEDURE — 93010 ELECTROCARDIOGRAM REPORT: CPT | Performed by: INTERNAL MEDICINE

## 2021-01-20 PROCEDURE — 93657 TX L/R ATRIAL FIB ADDL: CPT

## 2021-01-20 PROCEDURE — 86901 BLOOD TYPING SEROLOGIC RH(D): CPT

## 2021-01-20 PROCEDURE — 3700000001 HC ADD 15 MINUTES (ANESTHESIA)

## 2021-01-20 PROCEDURE — 93623 PRGRMD STIMJ&PACG IV RX NFS: CPT

## 2021-01-20 PROCEDURE — 93622 COMP EP EVAL L VENTR PAC&REC: CPT

## 2021-01-20 PROCEDURE — 93655 ICAR CATH ABLTJ DSCRT ARRHYT: CPT | Performed by: INTERNAL MEDICINE

## 2021-01-20 PROCEDURE — 6370000000 HC RX 637 (ALT 250 FOR IP): Performed by: NURSE ANESTHETIST, CERTIFIED REGISTERED

## 2021-01-20 PROCEDURE — 7100000001 HC PACU RECOVERY - ADDTL 15 MIN

## 2021-01-20 PROCEDURE — C1732 CATH, EP, DIAG/ABL, 3D/VECT: HCPCS

## 2021-01-20 PROCEDURE — 36415 COLL VENOUS BLD VENIPUNCTURE: CPT

## 2021-01-20 PROCEDURE — 93657 TX L/R ATRIAL FIB ADDL: CPT | Performed by: INTERNAL MEDICINE

## 2021-01-20 PROCEDURE — 93662 INTRACARDIAC ECG (ICE): CPT | Performed by: INTERNAL MEDICINE

## 2021-01-20 PROCEDURE — 93623 PRGRMD STIMJ&PACG IV RX NFS: CPT | Performed by: INTERNAL MEDICINE

## 2021-01-20 PROCEDURE — C1769 GUIDE WIRE: HCPCS

## 2021-01-20 PROCEDURE — 93005 ELECTROCARDIOGRAM TRACING: CPT | Performed by: INTERNAL MEDICINE

## 2021-01-20 PROCEDURE — 93613 INTRACARDIAC EPHYS 3D MAPG: CPT

## 2021-01-20 PROCEDURE — 93613 INTRACARDIAC EPHYS 3D MAPG: CPT | Performed by: INTERNAL MEDICINE

## 2021-01-20 PROCEDURE — 2500000003 HC RX 250 WO HCPCS

## 2021-01-20 PROCEDURE — 80053 COMPREHEN METABOLIC PANEL: CPT

## 2021-01-20 PROCEDURE — 2720000010 HC SURG SUPPLY STERILE

## 2021-01-20 PROCEDURE — 85610 PROTHROMBIN TIME: CPT

## 2021-01-20 PROCEDURE — 3700000000 HC ANESTHESIA ATTENDED CARE

## 2021-01-20 PROCEDURE — 85347 COAGULATION TIME ACTIVATED: CPT

## 2021-01-20 PROCEDURE — 7100000000 HC PACU RECOVERY - FIRST 15 MIN

## 2021-01-20 PROCEDURE — 93662 INTRACARDIAC ECG (ICE): CPT

## 2021-01-20 PROCEDURE — 93656 COMPRE EP EVAL ABLTJ ATR FIB: CPT | Performed by: INTERNAL MEDICINE

## 2021-01-20 PROCEDURE — 83735 ASSAY OF MAGNESIUM: CPT

## 2021-01-20 PROCEDURE — 86900 BLOOD TYPING SEROLOGIC ABO: CPT

## 2021-01-20 PROCEDURE — 85027 COMPLETE CBC AUTOMATED: CPT

## 2021-01-20 PROCEDURE — 2580000003 HC RX 258: Performed by: NURSE ANESTHETIST, CERTIFIED REGISTERED

## 2021-01-20 PROCEDURE — 93320 DOPPLER ECHO COMPLETE: CPT

## 2021-01-20 PROCEDURE — 93656 COMPRE EP EVAL ABLTJ ATR FIB: CPT

## 2021-01-20 PROCEDURE — 6360000002 HC RX W HCPCS: Performed by: NURSE ANESTHETIST, CERTIFIED REGISTERED

## 2021-01-20 PROCEDURE — 86850 RBC ANTIBODY SCREEN: CPT

## 2021-01-20 PROCEDURE — 93312 ECHO TRANSESOPHAGEAL: CPT

## 2021-01-20 PROCEDURE — 6360000002 HC RX W HCPCS

## 2021-01-20 PROCEDURE — C1759 CATH, INTRA ECHOCARDIOGRAPHY: HCPCS

## 2021-01-20 PROCEDURE — 93325 DOPPLER ECHO COLOR FLOW MAPG: CPT

## 2021-01-20 RX ORDER — PROPOFOL 10 MG/ML
INJECTION, EMULSION INTRAVENOUS PRN
Status: DISCONTINUED | OUTPATIENT
Start: 2021-01-20 | End: 2021-01-20 | Stop reason: SDUPTHER

## 2021-01-20 RX ORDER — SODIUM CHLORIDE 9 MG/ML
INJECTION, SOLUTION INTRAVENOUS CONTINUOUS PRN
Status: DISCONTINUED | OUTPATIENT
Start: 2021-01-20 | End: 2021-01-20 | Stop reason: SDUPTHER

## 2021-01-20 RX ORDER — SUCCINYLCHOLINE CHLORIDE 20 MG/ML
INJECTION INTRAMUSCULAR; INTRAVENOUS PRN
Status: DISCONTINUED | OUTPATIENT
Start: 2021-01-20 | End: 2021-01-20 | Stop reason: SDUPTHER

## 2021-01-20 RX ORDER — VECURONIUM BROMIDE 1 MG/ML
INJECTION, POWDER, LYOPHILIZED, FOR SOLUTION INTRAVENOUS PRN
Status: DISCONTINUED | OUTPATIENT
Start: 2021-01-20 | End: 2021-01-20 | Stop reason: SDUPTHER

## 2021-01-20 RX ORDER — ONDANSETRON 2 MG/ML
INJECTION INTRAMUSCULAR; INTRAVENOUS PRN
Status: DISCONTINUED | OUTPATIENT
Start: 2021-01-20 | End: 2021-01-20 | Stop reason: SDUPTHER

## 2021-01-20 RX ORDER — PANTOPRAZOLE SODIUM 40 MG/1
40 TABLET, DELAYED RELEASE ORAL
Qty: 90 TABLET | Refills: 1 | Status: SHIPPED | OUTPATIENT
Start: 2021-01-20 | End: 2021-06-18 | Stop reason: ALTCHOICE

## 2021-01-20 RX ORDER — PROTAMINE SULFATE 10 MG/ML
INJECTION, SOLUTION INTRAVENOUS PRN
Status: DISCONTINUED | OUTPATIENT
Start: 2021-01-20 | End: 2021-01-20 | Stop reason: SDUPTHER

## 2021-01-20 RX ORDER — FUROSEMIDE 10 MG/ML
INJECTION INTRAMUSCULAR; INTRAVENOUS PRN
Status: DISCONTINUED | OUTPATIENT
Start: 2021-01-20 | End: 2021-01-20 | Stop reason: SDUPTHER

## 2021-01-20 RX ORDER — LIDOCAINE HYDROCHLORIDE 20 MG/ML
SOLUTION OROPHARYNGEAL PRN
Status: DISCONTINUED | OUTPATIENT
Start: 2021-01-20 | End: 2021-01-20 | Stop reason: SDUPTHER

## 2021-01-20 RX ORDER — DEXAMETHASONE SODIUM PHOSPHATE 4 MG/ML
INJECTION, SOLUTION INTRA-ARTICULAR; INTRALESIONAL; INTRAMUSCULAR; INTRAVENOUS; SOFT TISSUE PRN
Status: DISCONTINUED | OUTPATIENT
Start: 2021-01-20 | End: 2021-01-20 | Stop reason: SDUPTHER

## 2021-01-20 RX ORDER — HEPARIN SODIUM 10000 [USP'U]/ML
INJECTION, SOLUTION INTRAVENOUS; SUBCUTANEOUS PRN
Status: DISCONTINUED | OUTPATIENT
Start: 2021-01-20 | End: 2021-01-20 | Stop reason: SDUPTHER

## 2021-01-20 RX ORDER — LIDOCAINE HYDROCHLORIDE 20 MG/ML
INJECTION, SOLUTION INFILTRATION; PERINEURAL PRN
Status: DISCONTINUED | OUTPATIENT
Start: 2021-01-20 | End: 2021-01-20 | Stop reason: SDUPTHER

## 2021-01-20 RX ORDER — GLYCOPYRROLATE 0.2 MG/ML
INJECTION INTRAMUSCULAR; INTRAVENOUS PRN
Status: DISCONTINUED | OUTPATIENT
Start: 2021-01-20 | End: 2021-01-20 | Stop reason: SDUPTHER

## 2021-01-20 RX ORDER — FENTANYL CITRATE 50 UG/ML
INJECTION, SOLUTION INTRAMUSCULAR; INTRAVENOUS PRN
Status: DISCONTINUED | OUTPATIENT
Start: 2021-01-20 | End: 2021-01-20 | Stop reason: SDUPTHER

## 2021-01-20 RX ADMIN — FENTANYL CITRATE 50 MCG: 50 INJECTION INTRAMUSCULAR; INTRAVENOUS at 08:18

## 2021-01-20 RX ADMIN — FUROSEMIDE 5 MG: 10 INJECTION, SOLUTION INTRAMUSCULAR; INTRAVENOUS at 09:51

## 2021-01-20 RX ADMIN — HEPARIN SODIUM 6000 UNITS: 10000 INJECTION, SOLUTION INTRAVENOUS; SUBCUTANEOUS at 09:01

## 2021-01-20 RX ADMIN — DEXAMETHASONE SODIUM PHOSPHATE 8 MG: 4 INJECTION, SOLUTION INTRAMUSCULAR; INTRAVENOUS at 08:28

## 2021-01-20 RX ADMIN — PHENYLEPHRINE HYDROCHLORIDE 100 MCG: 10 INJECTION INTRAVENOUS at 10:01

## 2021-01-20 RX ADMIN — SUCCINYLCHOLINE CHLORIDE 100 MG: 20 INJECTION, SOLUTION INTRAMUSCULAR; INTRAVENOUS at 08:23

## 2021-01-20 RX ADMIN — WATER 10 ML: 1 INJECTION INTRAMUSCULAR; INTRAVENOUS; SUBCUTANEOUS at 08:20

## 2021-01-20 RX ADMIN — PHENYLEPHRINE HYDROCHLORIDE 100 MCG: 10 INJECTION INTRAVENOUS at 08:25

## 2021-01-20 RX ADMIN — SODIUM CHLORIDE: 9 INJECTION, SOLUTION INTRAVENOUS at 08:11

## 2021-01-20 RX ADMIN — SUGAMMADEX 200 MG: 100 INJECTION, SOLUTION INTRAVENOUS at 10:17

## 2021-01-20 RX ADMIN — HEPARIN SODIUM 2000 UNITS: 10000 INJECTION, SOLUTION INTRAVENOUS; SUBCUTANEOUS at 09:56

## 2021-01-20 RX ADMIN — LIDOCAINE HYDROCHLORIDE 4 ML: 20 SOLUTION ORAL; TOPICAL at 08:22

## 2021-01-20 RX ADMIN — FENTANYL CITRATE 50 MCG: 50 INJECTION INTRAMUSCULAR; INTRAVENOUS at 08:51

## 2021-01-20 RX ADMIN — LIDOCAINE HYDROCHLORIDE 100 MG: 20 INJECTION, SOLUTION INFILTRATION; PERINEURAL at 08:19

## 2021-01-20 RX ADMIN — VECURONIUM BROMIDE 2 MG: 1 INJECTION, POWDER, LYOPHILIZED, FOR SOLUTION INTRAVENOUS at 08:18

## 2021-01-20 RX ADMIN — ISOPROTERENOL HYDROCHLORIDE 5 MCG/MIN: 0.2 INJECTION, SOLUTION INTRAMUSCULAR; INTRAVENOUS at 09:57

## 2021-01-20 RX ADMIN — ONDANSETRON 4 MG: 2 INJECTION INTRAMUSCULAR; INTRAVENOUS at 08:31

## 2021-01-20 RX ADMIN — PHENYLEPHRINE HYDROCHLORIDE 100 MCG: 10 INJECTION INTRAVENOUS at 09:47

## 2021-01-20 RX ADMIN — FUROSEMIDE 5 MG: 10 INJECTION, SOLUTION INTRAMUSCULAR; INTRAVENOUS at 09:57

## 2021-01-20 RX ADMIN — PROTAMINE SULFATE 20 MG: 10 INJECTION, SOLUTION INTRAVENOUS at 10:15

## 2021-01-20 RX ADMIN — FUROSEMIDE 5 MG: 10 INJECTION, SOLUTION INTRAMUSCULAR; INTRAVENOUS at 09:39

## 2021-01-20 RX ADMIN — PROPOFOL 200 MG: 10 INJECTION, EMULSION INTRAVENOUS at 08:20

## 2021-01-20 RX ADMIN — GLYCOPYRROLATE 0.2 MG: 0.2 INJECTION INTRAMUSCULAR; INTRAVENOUS at 08:15

## 2021-01-20 RX ADMIN — SODIUM CHLORIDE: 9 INJECTION, SOLUTION INTRAVENOUS at 09:47

## 2021-01-20 RX ADMIN — GLYCOPYRROLATE 0.2 MG: 0.2 INJECTION INTRAMUSCULAR; INTRAVENOUS at 08:54

## 2021-01-20 RX ADMIN — FUROSEMIDE 5 MG: 10 INJECTION, SOLUTION INTRAMUSCULAR; INTRAVENOUS at 09:32

## 2021-01-20 RX ADMIN — HEPARIN SODIUM 5000 UNITS: 10000 INJECTION, SOLUTION INTRAVENOUS; SUBCUTANEOUS at 09:24

## 2021-01-20 RX ADMIN — PHENYLEPHRINE HYDROCHLORIDE 50 MCG/MIN: 10 INJECTION INTRAVENOUS at 08:26

## 2021-01-20 RX ADMIN — PHENYLEPHRINE HYDROCHLORIDE 100 MCG: 10 INJECTION INTRAVENOUS at 10:05

## 2021-01-20 RX ADMIN — PHENYLEPHRINE HYDROCHLORIDE 50 MCG: 10 INJECTION INTRAVENOUS at 08:19

## 2021-01-20 RX ADMIN — PHENYLEPHRINE HYDROCHLORIDE 100 MCG: 10 INJECTION INTRAVENOUS at 09:15

## 2021-01-20 RX ADMIN — PHENYLEPHRINE HYDROCHLORIDE 50 MCG: 10 INJECTION INTRAVENOUS at 09:42

## 2021-01-20 RX ADMIN — SODIUM CHLORIDE: 9 INJECTION, SOLUTION INTRAVENOUS at 08:55

## 2021-01-20 RX ADMIN — PHENYLEPHRINE HYDROCHLORIDE 50 MCG: 10 INJECTION INTRAVENOUS at 09:06

## 2021-01-20 ASSESSMENT — PULMONARY FUNCTION TESTS
PIF_VALUE: 23
PIF_VALUE: 25
PIF_VALUE: 25
PIF_VALUE: 6
PIF_VALUE: 21
PIF_VALUE: 21
PIF_VALUE: 25
PIF_VALUE: 21
PIF_VALUE: 21
PIF_VALUE: 19
PIF_VALUE: 0
PIF_VALUE: 1
PIF_VALUE: 21
PIF_VALUE: 1
PIF_VALUE: 21
PIF_VALUE: 21
PIF_VALUE: 19
PIF_VALUE: 0
PIF_VALUE: 21
PIF_VALUE: 25
PIF_VALUE: 21
PIF_VALUE: 21
PIF_VALUE: 13
PIF_VALUE: 2
PIF_VALUE: 21
PIF_VALUE: 15
PIF_VALUE: 19
PIF_VALUE: 23
PIF_VALUE: 21
PIF_VALUE: 23
PIF_VALUE: 25
PIF_VALUE: 23
PIF_VALUE: 1
PIF_VALUE: 19
PIF_VALUE: 21
PIF_VALUE: 6
PIF_VALUE: 21
PIF_VALUE: 24
PIF_VALUE: 21
PIF_VALUE: 23
PIF_VALUE: 26
PIF_VALUE: 21
PIF_VALUE: 16
PIF_VALUE: 21
PIF_VALUE: 25
PIF_VALUE: 26
PIF_VALUE: 21
PIF_VALUE: 11
PIF_VALUE: 20
PIF_VALUE: 25
PIF_VALUE: 21
PIF_VALUE: 25
PIF_VALUE: 17
PIF_VALUE: 25
PIF_VALUE: 21
PIF_VALUE: 1
PIF_VALUE: 24
PIF_VALUE: 21

## 2021-01-20 NOTE — PROGRESS NOTES
Pt arrived from cath lab to PACU, awakens to voice, denies pain. VSS, O2 sats 100% on 6 L simple mask. Pt in NSR with HR in 70s. Right groin soft, dressing dry. Right pedal and posterior tibial pulses palpable. Ramos in place draining clear yellow urine. Will monitor.

## 2021-01-20 NOTE — ANESTHESIA POSTPROCEDURE EVALUATION
Department of Anesthesiology  Postprocedure Note    Patient: Lilly Olson  MRN: [id number]  YOB: 1960  Date of evaluation: 1/20/2021  Time:  10:45 AM     Procedure Summary     Date: 01/20/21 Room / Location: [facility]; [facility]    Anesthesia Start: 0811 Anesthesia Stop: 8168    Procedure: ECHO MASSIMO IN CARDIAC CATH Diagnosis:     Scheduled Providers:  Responsible Provider: Sam Kraft MD    Anesthesia Type: general ASA Status: 3          Anesthesia Type: general    Mak Phase I: Mak Score: 8    Mak Phase II:      Last vitals: Reviewed and per EMR flowsheets.        Anesthesia Post Evaluation    Patient location during evaluation: PACU  Patient participation: complete - patient participated  Level of consciousness: awake and awake and alert  Pain score: 2  Airway patency: patent  Nausea & Vomiting: no vomiting  Complications: no  Cardiovascular status: blood pressure returned to baseline  Respiratory status: acceptable  Hydration status: euvolemic

## 2021-01-20 NOTE — ANESTHESIA PRE PROCEDURE
Department of Anesthesiology  Preprocedure Note       Name:  Lacie Barrett   Age:  63 y.o.  :  1960                                          MRN:  [id number]         Date:  2021      Surgeon: * No surgeons listed *    Procedure: * No procedures listed *    Medications prior to admission:   Prior to Admission medications    Medication Sig Start Date End Date Taking? Authorizing Provider   metoprolol (LOPRESSOR) 100 MG tablet TAKE 1 TABLET BY MOUTH TWICE DAILY 20   Bettina Andrade MD   rivaroxaban (XARELTO) 20 MG TABS tablet Take 1 tablet by mouth daily (with breakfast) 10/6/20   Tona Aranda MD   dilTIAZem (CARDIZEM CD) 180 MG extended release capsule Take 1 capsule by mouth daily 10/6/20   Tona Aranda MD   fexofenadine (ALLEGRA) 180 MG tablet Take 180 mg by mouth daily    Historical Provider, MD   carbidopa-levodopa (SINEMET)  MG per tablet Take 3 tablets by mouth 4 times daily  Patient taking differently: Take 3 tablets by mouth 4 times daily Pt takes at [id number] [id number] 2/6/20   Ji Smallwood, APRN - CNP       Current medications:    Current Outpatient Medications   Medication Sig Dispense Refill    metoprolol (LOPRESSOR) 100 MG tablet TAKE 1 TABLET BY MOUTH TWICE DAILY 180 tablet 1    rivaroxaban (XARELTO) 20 MG TABS tablet Take 1 tablet by mouth daily (with breakfast) 90 tablet 3    dilTIAZem (CARDIZEM CD) 180 MG extended release capsule Take 1 capsule by mouth daily 90 capsule 3    fexofenadine (ALLEGRA) 180 MG tablet Take 180 mg by mouth daily      carbidopa-levodopa (SINEMET)  MG per tablet Take 3 tablets by mouth 4 times daily (Patient taking differently: Take 3 tablets by mouth 4 times daily Pt takes at 5413-5829 noon-1600-) 120 tablet 1     No current facility-administered medications for this encounter.         Allergies:  No Known Allergies    Problem List:    Patient Active Problem List   Diagnosis Code    Essential hypertension I10    KATELIN (obstructive sleep apnea) G47.33    PAF (paroxysmal atrial fibrillation) (HCC) I48.0    Macrocytosis without anemia D75.89    Disabling essential tremor G25.0    Adjustment disorder with anxious mood F43.22    Moderate episode of recurrent major depressive disorder ([address] F33.1    Obesity (BMI 30.0-34. 9) E66.9    Parkinson's disease ([address] G20    Syncope and collapse R55       Past Medical History:        Diagnosis Date    Anxiety     Atrial fibrillation ([address]     Atrial flutter (HCC)     Cervical arthritis 3/30/2012    CTS (carpal tunnel syndrome) 3/13/2013    Essential tremor 2017    Hypertension     KATELIN (obstructive sleep apnea)     DOES NOT USE C-PAP    Osteoarthritis of back 11/6/2014    Osteoarthritis of right hip 11/6/2014    Parkinson disease [facility]     S/P ablation of atrial fibrillation 2/25/2016    Radiofrequency ablation of atrial fibrillation and pulmonary veins isolation Additional ablation of complex atrial fractionated electrogram     Sinusitis     Trigger finger 12/10/2014       Past Surgical History:        Procedure Laterality Date    ANKLE SURGERY  2015    ANKLE SURGERY Left     ATRIAL ABLATION SURGERY  1998    ATRIAL ABLATION SURGERY  2/25/16    RFCA of AF and PVI, additional ablation complex atrial fractioned electrogram    BACK SURGERY  1984    lumbar patino    CARDIOVERSION  4/6/16    CARPAL TUNNEL RELEASE Right 11/19/2013    RIGHT CARPAL TUNNEL RELEASE      CARPAL TUNNEL RELEASE Left 12-10-13     CARPAL TUNNEL RELEASE                COLONOSCOPY  10/29/2007    COLONOSCOPY N/A 7/30/2020    COLONOSCOPY POLYPECTOMY SNARE/COLD BIOPSY performed by Greg Ortiz MD at [address] [place] [address] TOTAL HIP ARTHROPLASTY Right 2016    right hip replacement       Social History:    Social History     Tobacco Use    Smoking status: Former Smoker     Packs/day: 1.00     Years: 15.00     Pack years: 15.00     Quit date: 1996     Years since quittin.6    Smokeless tobacco: Never Used   Substance Use Topics    Alcohol use: Not Currently     Alcohol/week: 20.0 standard drinks     Types: 20 Standard drinks or equivalent per week     Comment: weekends ; quit 2020                                Counseling given: Not Answered      Vital Signs (Current): There were no vitals filed for this visit. BP Readings from Last 3 Encounters:   20 134/72   10/06/20 121/78   20 (!) 140/82       NPO Status:                                                                                 BMI:   Wt Readings from Last 3 Encounters:   20 239 lb (108.4 kg)   10/06/20 231 lb (104.8 kg)   20 233 lb 14.5 oz (106.1 kg)     There is no height or weight on file to calculate BMI.    CBC:   Lab Results   Component Value Date    WBC 6.7 2020    RBC 4.54 2020    HGB 15.2 2020    HCT 44.8 2020    MCV 98.9 2020    RDW 14.0 2020     2020       CMP:   Lab Results   Component Value Date     2020    K 4.3 2020    K 4.5 2020     2020    CO2 25 2020    BUN 15 2020    CREATININE 0.7 2020    GFRAA >60 2020    GFRAA >60 2013    AGRATIO 2.0 2020    LABGLOM >60 2020    GLUCOSE 103 2020    PROT 7.0 2020    PROT 7.8 2013    CALCIUM 9.8 2020    BILITOT 0.7 2020    ALKPHOS 113 2020    AST 14 2020    ALT 7 2020       POC Tests: No results for input(s): POCGLU, POCNA, POCK, POCCL, POCBUN, POCHEMO, POCHCT in the last 72 hours.     Coags:   Lab Results   Component Value Date    PROTIME 15.0 2020    INR 1.29 2020    APTT 37.0 2020       HCG (If Applicable): No results found for: PREGTESTUR, PREGSERUM, HCG, HCGQUANT     ABGs: No results found for: PHART, PO2ART, EQG7XUP, MRA0RZM, BEART, S9FGUSRK     Type & Screen (If Applicable):  Lab Results   Component Value Date    LABABO O 11/21/2016    LABRH Positive 11/21/2016       Drug/Infectious Status (If Applicable):  No results found for: HIV, HEPCAB    COVID-19 Screening (If Applicable):   Lab Results   Component Value Date    COVID19 NOT DETECTED 01/15/2021         Anesthesia Evaluation    Airway: Mallampati: II  TM distance: >3 FB   Neck ROM: full  Mouth opening: > = 3 FB Dental:          Pulmonary:   (+) sleep apnea:                             Cardiovascular:    (+) hypertension:,         Rhythm: regular  Rate: normal                    Neuro/Psych:   (+) neuromuscular disease:, psychiatric history:            GI/Hepatic/Renal:             Endo/Other:                     Abdominal:           Vascular:                                        Anesthesia Plan      general     ASA 3       Induction: intravenous. Anesthetic plan and risks discussed with patient. Plan discussed with CRNA.                   Moo Meyers MD   1/20/2021

## 2021-01-20 NOTE — PROCEDURES
proceed with the ablation. Written informed consent was signed and placed in the chart. Patient was brought to the EP lab in a fasting non-sedate state. Patient underwent general anesthesia by anesthesia team. We initially performed a transesophageal echo that showed no left atrial appendage clot/thrombus. Groins were prepped in a sterile fashion. Femoral venous access was obtained under live ultrasound guidance. The femoral vein was identified with real time visualization of needle passage to the venous lumen. We gained access to right femoral vein. Two 8F and one long 8.5 sheath was using and were placed in the right femoral vein using modified Seldinger technique and ultrasound guidance. Then a CS cathter was placed inside the coronary sinus under fluoroscopy for recording and mapping of the left atrium. Using ICE we delineated the left pulmonary vein, left atrial appendage,  mitral valve, and right superior and right inferior pulmonary veins. Patient received a bolus of heparin prior transseptal puncture followed by continuous monitoring of the ACT and boluses of heparin during the procedure to keep the ACT more than 350. Also an esophageal temperature probe in addition to Esophastar catheter was advanced into the esophagus for mapping of the esophagus and careful monitoring of the esophageal temperature during ablation. Transseptal punctures through intact septum were done using ICE, pressure monitoring, and SafeSept. Transseptal puncture was challenging due to thick septum. Using Penta ray and Carto navigation system a three dimensional electro anatomical mapping of the left atrium, in addition to right and left sided pulmonary vein was created. Using Penta ray catheter voltage mapping of the left atrium was created. LSPV has PV fascicles, the triggers for the atrial fibrillation and reconnection.      There was extensive scar on the roof with gap between the scars and also with (AV wenckebach) was 460 msec  Atrial ERP of 600/250 msec  AVN ERP of 600/300/220 msec  Pacing from RV apex, 1:1 conduction over AV node (VA wenckebach) was 290 msec    Procedure was performed with intracardiac ultrasound and 3D mapping system without using fluoroscopy. At the end of the procedure, using ICE we confirmed the lack of any pericardial effusion. Protamine was given to reverse the heapin. Figure of 8 suture was used and sheaths were removed using manual compression. The patient tolerated the procedure well and there were no complications. Patient was extubated and transferred to the floor in stable condition. EBL less than 20 ml. Plan:   The patient will be observed overnight. He will receive usual post ablation care.      Belén Ramirez MD, MPH  [facility]   Office: [phone]

## 2021-01-20 NOTE — PROGRESS NOTES
Pt resting quietly in bed, awake, denies pain. VSS, O2 sats 95% on room air. Pt in NSR with HR in 70s. Dressing in right groin dry and intact, groin soft. Right pedal and posterior tibial pulses palpable, foot warm. Pt seen by anesthesia, OK with transferring pt back to cath lab for discharge per orders.

## 2021-01-20 NOTE — ANESTHESIA POSTPROCEDURE EVALUATION
Department of Anesthesiology  Postprocedure Note    Patient: Brandi Scherer  MRN: [id number]  YOB: 1960  Date of evaluation: 1/20/2021  Time:  11:28 AM     Procedure Summary     Date: 01/20/21 Room / Location: [facility] Cath Lab; [facility] Echocardiography    Anesthesia Start: 0811 Anesthesia Stop: 2864    Procedure: ECHO MASSIMO IN CARDIAC CATH Diagnosis:     Scheduled Providers:  Responsible Provider: Gifty Rice MD    Anesthesia Type: general ASA Status: 3          Anesthesia Type: general    Mak Phase I: Mak Score: 10    Mak Phase II:      Last vitals: Reviewed and per EMR flowsheets.        Anesthesia Post Evaluation    Level of consciousness: awake  Complications: no

## 2021-01-20 NOTE — H&P
[facility]  Electrophysiology Followup      CC: Atrial fibrillation  History of Present Illness:  Ann Lopez is 63 y.o. male history of symptomatic persistent atrial fibrillation. He also has history of prior atrial fibrillation/flutter ablation in late 90's at  by Dr. Vicky Porter. 2/25/16, he had PVI and CAFE ablation with restoration of sinus rhythm with ablation. He returned with recurrent atrial fibrillation after being off Propafenone and was cardioverted on 4/6/16. He presented to [facility] ED on 9/2020 with complaints of sudden palpitations while watching TV. Admitted on 9/2020 with atrial fibrillation and RVR. He stated that his afib was worsening in the past few months prior to ablation. He had cardioversion on 8/2020 prior to admission. He has had recurrent atrial fibrillation and fela like to proceed with ablation. Assessment and Plan:  Atrial fibrillation/ flutter    We discussed treatment options including antiarrhythmics, rate control with anticoagulation, and ablation. We discussed progressive nature of atrial fibrillation. Treatment success decreases when AF becomes persistent and last more than 6 months. Antiarrhythmic therapy, side effects, benefits and alternative discussed. Atrial fibrillation ablation procedure was discussed. We discussed the need for repeat procedure. On average patients may need more than one ablation procedure. Risks associated with ablation include but not limited to allergic reaction to the medications, pain, bleeding, infection, nerve injury, injury to diaphragm(breathing muscle), pulmonary embolus(blood clot in lungs), deep vein blood clot, pneumothorax, hemothorax, acute renal failure, cardiac perforation,  tamponade, need for emergent surgery (open heart), permanent pacemaker, pulmonary vein stenosis, left atrial to esophageal fistula, stroke, myocardial infarction and death.  Difference between atrial fibrillation and atrial flutter discussed and treatment discussed. Patient verbalized understanding of procedure, risks and benefits and wishes to proceed with ablation. -S/p PVI and CFAE ablation with restoration of sinus rhythm on 2/25/16       HTN  Vitals:    01/20/21 0723   BP: (!) 143/82   Pulse: 58   Temp: 98 °F (36.7 °C)   SpO2: 99%     -Home BP monitoring encourage with a BP goal <130/80        KATELIN:   -intolerant of CPAP  - Refuses to wear  -Encouraged to Lose weight     Obesity  Body mass index is 31.19 kg/m². - Excessive weight is complicating assessment and treatment. It is placing patient at risk for multiple co-morbidities as well as early death and contributing to the patient's presentation. - discussed weight management with diet and exercise           - The patient is counseled to follow a low salt diet to assure blood pressure remains controlled for cardiovascular risk factor modification.   - The patient is counseled to avoid excess caffeine, and energy drinks as this may exacerbated ectopy and arrhythmia. - The patient is counseled to get regular exercise 3-5 times per week to control cardiovascular risk factors. - The patient is counseled to lose weight to control cardiovascular risk factors. Diagnostic testing:      EKG 10/8/20  Sinus rhythm    Echo:  2/25/16  Normal left ventricle size, wall thickness and systolic function with an   estimated ejection fraction of 55%.   Mild mitral regurgitation is present.  Rosa Maria Shirley is no evidence of mass or thrombus in the left atrium or appendage.   The aortic root is mildly dilated. 3/2013  Preserved LV systolic function with EF of 55%  Reversal of E/A inflow velocities across the mitral valve suggesting impaired left ventricular relaxation. Mild mitral regurgitation is present. Mildly dilated left atrium.       Past Medical History:   has a past medical history of Anxiety, Atrial fibrillation ([address] Atrial flutter ([address] Cervical arthritis, CTS (carpal tunnel syndrome), Essential tremor, Hypertension, KATELIN (obstructive sleep apnea), Osteoarthritis of back, Osteoarthritis of right hip, Parkinson disease ([facility] [address] S/P ablation of atrial fibrillation, Sinusitis, and Trigger finger. Surgical History:   has a past surgical history that includes Atrial ablation surgery (1998); Colonoscopy (10/29/2007); back surgery (1984); Toe Surgery; Salivary gland surgery; Carpal tunnel release (Right, 11/19/2013); Carpal tunnel release (Left, 12-10-13); Atrial ablation surgery (2/25/16); Cardioversion (4/6/16); Lumbar spine surgery (1984); Ankle surgery (2015); Total hip arthroplasty (Right, 2016); Ankle surgery (Left); and Colonoscopy (N/A, 7/30/2020). Social History:   reports that he quit smoking about 24 years ago. He has a 15.00 pack-year smoking history. He has never used smokeless tobacco. He reports previous alcohol use of about 20.0 standard drinks of alcohol per week. He reports previous drug use. Drug: Marijuana. Family History:  family history includes Alcohol Abuse in his father; High Blood Pressure in his mother; High Cholesterol in his mother; Other in an other family member. Home Medications:  Prior to Admission medications    Medication Sig Start Date End Date Taking?  Authorizing Provider   metoprolol (LOPRESSOR) 100 MG tablet TAKE 1 TABLET BY MOUTH TWICE DAILY 12/18/20  Yes Karen White MD   dilTIAZem (CARDIZEM CD) 180 MG extended release capsule Take 1 capsule by mouth daily 10/6/20  Yes Danilo Moura MD   fexofenadine (ALLEGRA) 180 MG tablet Take 180 mg by mouth daily   Yes Historical Provider, MD   carbidopa-levodopa (SINEMET)  MG per tablet Take 3 tablets by mouth 4 times daily  Patient taking differently: Take 3 tablets by mouth 4 times daily Pt takes at 2431-1915 [id number] 2/6/20  Yes EMLIIA Moulton - CNP   rivaroxaban (XARELTO) 20 MG TABS tablet Take 1 tablet by mouth daily (with breakfast) 10/6/20   Danilo Moura MD Allergies:  Patient has no known allergies. DATA:    Lab Results   Component Value Date    TSHREFLEX 1.59 09/13/2020    TSH 2.03 06/17/2020    TSH 2.28 09/06/2019    CREATININE 0.7 09/14/2020    CREATININE 0.9 09/13/2020    AST 14 06/17/2020    ALT 7 06/17/2020       Review of System:  All other systems reviewed except for that noted above. Pertinent negatives and positives are:     · General: negative for fever, chills   · Ophthalmic ROS: negative for - eye pain or loss of vision  · ENT ROS: negative for - headaches, sore throat   · Respiratory: negative for - cough, sputum  · Cardiovascular: Reviewed in HPI  · Gastrointestinal: negative for - abdominal pain, diarrhea, N/V  · Hematology: negative for - bleeding, blood clots, bruising or jaundice  · Genito-Urinary:  negative for - Dysuria or incontinence  · Musculoskeletal: negative for - Joint swelling, muscle pain  · Neurological: negative for - confusion, dizziness, headaches   · Psychiatric: No anxiety, no depression. · Dermatological: negative for - rash      Physical Examination:    BP (!) 143/82   Pulse 58   Temp 98 °F (36.7 °C) (Temporal)   Ht 6' (1.829 m)   Wt 230 lb (104.3 kg)   SpO2 99%   BMI 31.19 kg/m²         Wt Readings from Last 3 Encounters:   01/20/21 230 lb (104.3 kg)   12/18/20 239 lb (108.4 kg)   10/06/20 231 lb (104.8 kg)     · Constitutional: Oriented. No distress. · Head: Normocephalic and atraumatic. · Mouth/Throat: Oropharynx is clear and moist.   · Eyes: Conjunctivae normal. EOM are normal.   · Neck: Neck supple. No JVD present. · Cardiovascular: Normal rate, regular rhythm, S1&S2. · Pulmonary/Chest: Bilateral respiratory sounds. No rhonchi. · Abdominal: Soft. No tenderness. · Musculoskeletal: No tenderness. No edema    · Lymphadenopathy: Has no cervical adenopathy. · Neurological: Alert and oriented. Follows command, No Gross deficit   · Skin: Skin is warm, No rash noted.    · Psychiatric: Has a normal behavior         NOTE: This report was transcribed using voice recognition software. Every effort was made to ensure accuracy, however, inadvertent computerized transcription errors may be present. Vicky Ramos MD, MPH  [facility]   Office: [phone]    H&P Update    I have reviewed the history and physical and examined the patient and updated with relevant changes. Consent: I have discussed with the patient and/or the patient representative the indication, alternatives, and the possible risks and/or complications of the planned procedure and the anesthesia methods. The patient and/or patient representative appear to understand and agree to proceed. Vitals:    01/20/21 0723   BP: (!) 143/82   Pulse: 58   Temp: 98 °F (36.7 °C)   SpO2: 99%     Prior to Admission medications    Medication Sig Start Date End Date Taking?  Authorizing Provider   metoprolol (LOPRESSOR) 100 MG tablet TAKE 1 TABLET BY MOUTH TWICE DAILY 12/18/20  Yes Aníbal Beckman MD   dilTIAZem (CARDIZEM CD) 180 MG extended release capsule Take 1 capsule by mouth daily 10/6/20  Yes Vicky Ramos MD   fexofenadine (ALLEGRA) 180 MG tablet Take 180 mg by mouth daily   Yes Historical Provider, MD   carbidopa-levodopa (SINEMET)  MG per tablet Take 3 tablets by mouth 4 times daily  Patient taking differently: Take 3 tablets by mouth 4 times daily Pt takes at [id number] [id number] 2/6/20  Yes Sánchez Salcido, APRN - CNP   rivaroxaban (Lázaro Tajik) 20 MG TABS tablet Take 1 tablet by mouth daily (with breakfast) 10/6/20   Vicky Ramos MD     Past Medical History:   Diagnosis Date    Anxiety     Atrial fibrillation [facility])     Atrial flutter [facility]     Cervical arthritis 3/30/2012    CTS (carpal tunnel syndrome) 3/13/2013    Essential tremor 2017    Hypertension     KATELIN (obstructive sleep apnea)     DOES NOT USE C-PAP    Osteoarthritis of back 11/6/2014    Osteoarthritis of right hip 11/6/2014    Parkinson disease ([facility]     S/P ablation of atrial fibrillation 2/25/2016    Radiofrequency ablation of atrial fibrillation and pulmonary veins isolation Additional ablation of complex atrial fractionated electrogram     Sinusitis     Trigger finger 12/10/2014     Past Surgical History:   Procedure Laterality Date    ANKLE SURGERY  2015    ANKLE SURGERY Left     ATRIAL ABLATION SURGERY  1998    ATRIAL ABLATION SURGERY  2/25/16    RFCA of AF and PVI, additional ablation complex atrial fractioned electrogram    BACK SURGERY  1984    lumbar patino    CARDIOVERSION  4/6/16    CARPAL TUNNEL RELEASE Right 11/19/2013    RIGHT CARPAL TUNNEL RELEASE      CARPAL TUNNEL RELEASE Left 12-10-13     CARPAL TUNNEL RELEASE                COLONOSCOPY  10/29/2007    COLONOSCOPY N/A 7/30/2020    COLONOSCOPY POLYPECTOMY SNARE/COLD BIOPSY performed by Woo Reilly MD at [address] [place] [address] TOTAL HIP ARTHROPLASTY Right 2016    right hip replacement     No Known Allergies    Documentation and Exam:   I have personally completed a history, physical exam & review of systems for this patient (see notes).     Sedation will be provided by anesthesia team.     Electronically signed by Richy Morris MD on 1/20/2021 at 8:04 AM

## 2021-01-20 NOTE — ANESTHESIA POSTPROCEDURE EVALUATION
Department of Anesthesiology  Postprocedure Note    Patient: Lacie Barrett  MRN: [id number]  YOB: 1960  Date of evaluation: 1/20/2021  Time:  11:24 AM     Procedure Summary     Date: 01/20/21 Room / Location: [facility] Cath Lab; [facility] Echocardiography    Anesthesia Start: 0811 Anesthesia Stop: 1180    Procedure: ECHO MASSIMO IN CARDIAC CATH Diagnosis:     Scheduled Providers:  Responsible Provider: Mehnaz Goodman MD    Anesthesia Type: general ASA Status: 3          Anesthesia Type: general    Mak Phase I: Mak Score: 10    Mak Phase II:      Last vitals: Reviewed and per EMR flowsheets.        Anesthesia Post Evaluation    Level of consciousness: awake  Complications: no

## 2021-01-21 ENCOUNTER — TELEPHONE (OUTPATIENT)
Dept: INTERNAL MEDICINE CLINIC | Age: 61
End: 2021-01-21

## 2021-01-29 NOTE — PROGRESS NOTES
[address]   Electrophysiology  EMILIA Alonso-CNP  Attending EP: Dr. Rigoberto Perez    Date: 2/17/2021  I had the privilege of visiting Johan Lantigua in the office. Chief Complaint:   Chief Complaint   Patient presents with    Atrial Fibrillation     History of Present Illness: History obtained from patient and medical record. Johan Lantigua is 63 y.o. male with a past medical history of atrial fibrillation, KATELIN, HTN, and Parkinson's disease. Hx of atrial fibrillation/flutter at [facility] in the late 90s. S/p RFCA with PVI and CFAE ablation (2/25/16). S/p DCCV (4/6/16). In September of 2020, he had recurrent AF. S/p RFCA with PVI, focal left sided atrial tachycardia, and roof line (1/20/21). -Interval history: Today, Johan Lantigua is being seen for follow up. He is doing well since his ablation. No recurrent atrial fibrillation. He is in sinus rhythm on EKG today. Pt remains on Xarelto and denies any bleeding/bruising issues. He states the price of Xarelto is quite high compared to what it use to be. We discussed the short term need for anti-coagulation s/p ablation. Will discuss when/if he may stop Xarelto at his next visit. His BP is stable today, 126/74. Pt reports similar readings with some up to 130/80s at times. No issues with hypo/hypertensive episodes. He states he does not wear his CPAP at night as he is unable to tolerate it. We discussed the long term risks of untreated sleep apnea. He reports that he has gained 15-20 lbs in the past year that he attributes to his sedentary lifestyle and eating habits. He is hoping to lose some weight and get down to 235 lbs by the summer time. Pt denies any exertional symptoms with his daily activity. Denies having chest pain, palpitations, shortness of breath, orthopnea/PND, cough, or dizziness at the time of this visit. With regard to medication therapy the patient has been compliant with prescribed regimen.  They have tolerated therapy to date.     Allergies:  No Known Allergies    Home Medications:  Prior to Visit Medications    Medication Sig Taking? Authorizing Provider   pantoprazole (PROTONIX) 40 MG tablet Take 1 tablet by mouth every morning (before breakfast) Yes Nate Ware MD   rivaroxaban (XARELTO) 20 MG TABS tablet Take 1 tablet by mouth daily (with breakfast) Yes Nate Ware MD   metoprolol (LOPRESSOR) 100 MG tablet TAKE 1 TABLET BY MOUTH TWICE DAILY Yes Sarath Torrez MD   dilTIAZem (CARDIZEM CD) 180 MG extended release capsule Take 1 capsule by mouth daily Yes Nate Ware MD   fexofenadine (ALLEGRA) 180 MG tablet Take 180 mg by mouth daily Yes Historical Provider, MD   carbidopa-levodopa (SINEMET)  MG per tablet Take 3 tablets by mouth 4 times daily  Patient taking differently: Take 3 tablets by mouth 4 times daily Pt takes at [id number] [id number] Yes Lisa Klein APRN - CNP      Past Medical History:  Past Medical History:   Diagnosis Date    Anxiety     Atrial fibrillation ([address]     Atrial flutter ([address]     Cervical arthritis 3/30/2012    CTS (carpal tunnel syndrome) 3/13/2013    Essential tremor 2017    Hypertension     KATELIN (obstructive sleep apnea)     DOES NOT USE C-PAP    Osteoarthritis of back 11/6/2014    Osteoarthritis of right hip 11/6/2014    [facility]     S/P ablation of atrial fibrillation 2/25/2016    Radiofrequency ablation of atrial fibrillation and pulmonary veins isolation Additional ablation of complex atrial fractionated electrogram     Sinusitis     Trigger finger 12/10/2014     Past Surgical History:    has a past surgical history that includes Atrial ablation surgery (1998); Colonoscopy (10/29/2007); back surgery (1984); Toe Surgery; Salivary gland surgery; Carpal tunnel release (Right, 11/19/2013); Carpal tunnel release (Left, 12-10-13); Atrial ablation surgery (2/25/16); Cardioversion (4/6/16); Lumbar spine surgery (1984); Ankle surgery (2015);  Total hip arthroplasty (Right, 2016); Ankle surgery (Left); and Colonoscopy (N/A, 7/30/2020). Social History:  Reviewed. reports that he quit smoking about 24 years ago. He has a 15.00 pack-year smoking history. He has never used smokeless tobacco. He reports previous alcohol use of about 20.0 standard drinks of alcohol per week. He reports previous drug use. Drug: Marijuana. Family History:  Reviewed. family history includes Alcohol Abuse in his father; High Blood Pressure in his mother; High Cholesterol in his mother; Other in an other family member. Review of System:  · Constitutional: Negative for fever, night sweats, chills, weight changes, or weakness  · Skin: Negative for rash, dry skin, pruritus, bruising, bleeding, blood clots, or changes in skin pigment  · HEENT: Negative for vision changes, ringing in the ears, sore throat, dysphagia, or swollen lymph nodes  · Respiratory: Reviewed in HPI  · Cardiovascular: Reviewed in HPI  · Gastrointestinal: Negative for abdominal pain, N/V/D, constipation, or black/tarry stools  · Genito-Urinary: Negative for dysuria, incontinence, urgency, or hematuria  · Musculoskeletal: Negative for joint swelling, muscle pain, or injuries  · Neurological/Psych: Positive for tremors. Negative for confusion, seizures, dizziness, headaches, balance issues or TIA-like symptoms. No anxiety, depression, or insomnia    Physical Examination:  Vitals:    02/17/21 1020   BP: 126/74   Pulse: 56   SpO2: 97%      Wt Readings from Last 3 Encounters:   02/17/21 255 lb 6.4 oz (115.8 kg)   01/20/21 230 lb (104.3 kg)   12/18/20 239 lb (108.4 kg)     Constitutional: Cooperative and in no apparent distress, and appears well nourished  Skin: Warm and pink; no pallor, cyanosis, bruising, or clubbing  HEENT: Symmetric and normocephalic. PERRL, EOM intact. Conjunctiva pink with clear sclera. Mucus membranes pink and moist. Teeth intact.  Thyroid smooth without nodules or goiter  Respiratory: Respirations symmetric and unlabored. Lungs clear to auscultation bilaterally, no wheezing, rhonchi, or crackles  Cardiovascular:  Regular rate and rhythm. S1/S2 present without murmurs, rubs, or gallops. Peripheral pulses 1+, capillary refill < 3 seconds. No elevation of JVP. No peripheral edema  Gastrointestinal: Abdomen soft and round. Bowel sounds normoactive in all quadrants without tenderness or masses. + Obese  Musculoskeletal: Bilateral upper and lower extremity strength 5/5 with full ROM. Neurological/Psych: Awake and orientated to person, place and time. Calm affect, appropriate mood. Pertinent labs, diagnostic, device, and imaging results reviewed as a part of this visit    LABS    CBC:   Lab Results   Component Value Date    WBC 6.5 2021    HGB 15.5 2021    HCT 47.2 2021    MCV 95.8 2021     2021     BMP:   Lab Results   Component Value Date    CREATININE 0.7 (L) 2021    BUN 14 2021     2021    K 4.4 2021     2021    CO2 27 2021     Estimated Creatinine Clearance: 146 mL/min (A) (based on SCr of 0.7 mg/dL (L)). Thyroid:   Lab Results   Component Value Date    TSH 2.03 2020     Lipid Panel:   Lab Results   Component Value Date    CHOL 160 2020    HDL 41 2020    HDL 42 2011    TRIG 166 2020     LFTs:  Lab Results   Component Value Date    ALT 9 (L) 2021    AST 14 (L) 2021    ALKPHOS 120 2021    BILITOT 0.6 2021     Coags:   Lab Results   Component Value Date    PROTIME 11.2 2021    INR 0.97 2021    APTT 37.0 (H) 2020     EC2021  - Sinus bradycardia, rate 58    Echo:   -Limited exam per Dr. Karo Becerril. Patient had a MASSIMO 2020.   -Normal left ventricle size, wall thickness, and systolic function with an   estimated ejection fraction of 55%.   -No regional wall motion abnormalities are seen.   -Trivial mitral regurgitation.     MASSIMO: 21   Normal left weight as tolerated  3. Monitor blood pressure at home 1-2 times per week    F/U: Follow-up with Dr. Shannan Hale in 4 months  -Call [facility] at [phone] with any questions    Diet & Exercise:   The patient is counseled to follow a low salt diet to assure blood pressure remains controlled for cardiovascular risk factor modification   The patient is counseled to avoid excess caffeine, and energy drinks as this may exacerbated ectopy and arrhythmia   The patient is counseled to lose weight to control cardiovascular risk factors   Exercise program discussed: To improve overall cardiovascular health, the patient is instructed to increase cardiovascular related activities with a goal of 150 min/week of moderate level activity or 10,000 steps per day. Encouraged to perform as much activity as tolerated    Quality Metrics  1. Tobacco Cessation Counseling: N/A  2. Retake of BP if >140/90: N/A  3. Documentation to PCP: Note sent to PCP office visit  4. CAD patient on anti-platelet: N/A   5.   CAD patient on STATIN therapy: N/A   6. Patient with history of CHF and atrial fibrillation on anticoagulation: No due to low [id number] score       I have addressed the patient's cardiac risk factors and adjusted pharmacologic treatment as needed. In addition, I have reinforced the need for patient directed risk factor modification. I independently reviewed the ECG    All questions and concerns were addressed with the patient. Alternatives to treatment were discussed. Thank you for allowing to us to participate in the care of [address] [place] [address].     20-29 minutes spent preparing to see patient including reviewing patient history/prior tests/prior consults, performing a medical exam, counseling and educating patient/family/caregiver, ordering medications/tests/procedures, referring and communicating with PCPs and other pertinent consultants, documenting information in the EMR, independently interpreting results

## 2021-02-17 ENCOUNTER — OFFICE VISIT (OUTPATIENT)
Dept: CARDIOLOGY CLINIC | Age: 61
End: 2021-02-17
Payer: MEDICARE

## 2021-02-17 VITALS
DIASTOLIC BLOOD PRESSURE: 74 MMHG | HEIGHT: 72 IN | BODY MASS INDEX: 34.59 KG/M2 | HEART RATE: 56 BPM | OXYGEN SATURATION: 97 % | WEIGHT: 255.4 LBS | SYSTOLIC BLOOD PRESSURE: 126 MMHG

## 2021-02-17 DIAGNOSIS — I48.0 PAF (PAROXYSMAL ATRIAL FIBRILLATION) (HCC): Primary | ICD-10-CM

## 2021-02-17 DIAGNOSIS — I10 ESSENTIAL HYPERTENSION: Chronic | ICD-10-CM

## 2021-02-17 DIAGNOSIS — E66.9 OBESITY (BMI 30.0-34.9): ICD-10-CM

## 2021-02-17 DIAGNOSIS — G47.33 OSA (OBSTRUCTIVE SLEEP APNEA): ICD-10-CM

## 2021-02-17 DIAGNOSIS — I47.1 ATRIAL TACHYCARDIA (HCC): ICD-10-CM

## 2021-02-17 PROCEDURE — 93000 ELECTROCARDIOGRAM COMPLETE: CPT | Performed by: NURSE PRACTITIONER

## 2021-02-17 PROCEDURE — 1036F TOBACCO NON-USER: CPT | Performed by: NURSE PRACTITIONER

## 2021-02-17 PROCEDURE — G8427 DOCREV CUR MEDS BY ELIG CLIN: HCPCS | Performed by: NURSE PRACTITIONER

## 2021-02-17 PROCEDURE — G8484 FLU IMMUNIZE NO ADMIN: HCPCS | Performed by: NURSE PRACTITIONER

## 2021-02-17 PROCEDURE — 99213 OFFICE O/P EST LOW 20 MIN: CPT | Performed by: NURSE PRACTITIONER

## 2021-02-17 PROCEDURE — G8417 CALC BMI ABV UP PARAM F/U: HCPCS | Performed by: NURSE PRACTITIONER

## 2021-02-17 PROCEDURE — 3017F COLORECTAL CA SCREEN DOC REV: CPT | Performed by: NURSE PRACTITIONER

## 2021-03-23 NOTE — TELEPHONE ENCOUNTER
Sample requested: xarelto    Strength: 20 mg    Dosage: once daily    Patient's call back number: [phone]

## 2021-03-23 NOTE — TELEPHONE ENCOUNTER
Please advise on refill request '  Last office visit 02/17/2021  Next office visit 04/20/2021  Last labs 01/20/2021

## 2021-04-08 NOTE — PROGRESS NOTES
[address]   Electrophysiology Follow up   Date: 4/9/2021  I had the privilege of visiting Aiden Meehan in the office. CC: PAF   HPI: Aiden Meehan is a 63 y.o. male with a PMH of atrial fibrillation, KATELIN, HTN, and parkinson's disease. He also has history of prior atrial fibrillation/flutter ablation in late 90's at [facility] by Dr. Guillermina Simon.      2/25/16, he had PVI and CAFE ablation with restoration of sinus rhythm with ablation. He returned with recurrent atrial fibrillation after being off Propafenone and was cardioverted on 4/6/16.      He presented to [facility] ED on 9/2020 with complaints of sudden palpitations while watching TV.     Admitted on 9/2020 with atrial fibrillation and RVR. He stated that his afib was worsening in the past few months prior to ablation. He had cardioversion on 8/2020 prior to admission. S/p RFCA with PVI, focal left sided atrial tachycardia, and roof line (1/20/21). Jaxon Pinedo presents to the office in follow up. He states he has had a couple episodes where he was feeling palpitations and his heart rate was 145 BPM he took an extra dose of diltiazem. Uncertain if this was atrial fibrillation. Episodes occasionally of palpitations even with heart rate of 75. Assessment and plan:     PAF   -ECG today shows Sinus afshan   -Occasional complaints of palpitations     -Will do a monitor for two weeks to determine if he is having arrhythmia recurrence      -S/p RFCA with PVI, focal left sided atrial tachycardia, and roof line (1/20/21). -s/p DCCV 4/6/2016   -S/p RFCA with PVI and CFAE ablation (2/25/16)  - [id number] score: 1 (HTN) ; [id number] Vasc score and anticoagulation discussed. On Xarelto 20 mg QD      HTN  -Controlled  -BP goal <130/80  -Home BP monitoring encouraged, printed information provided on how to accurately measure BP at home.  -Counseled to follow a low salt diet to assure blood pressure remains controlled for cardiovascular risk factor modification. -The patient is counseled to get regular exercise 3-5 times per week and maintain a healthy weight reduce cardiovascular risk factors. Obesity  Body mass index is 33.5 kg/m². - Excessive weight is complicating assessment and treatment. It is placing patient at risk for multiple co-morbidities as well as early death and contributing to the patient's presentation. - discussed weight management with diet and exercise        KATELIN   -encouraged compliance with CPAP      Patient Active Problem List    Diagnosis Date Noted    [facility]     Parkinson's disease ([address] 08/19/2020    Syncope and collapse 08/19/2020    Obesity (BMI 30.0-34.9) 02/05/2020    Moderate episode of recurrent major depressive disorder ([address] 09/11/2018    Adjustment disorder with anxious mood 09/05/2018    Disabling essential tremor 12/01/2017    Macrocytosis without anemia 08/18/2017    PAF (paroxysmal atrial fibrillation) ([facility] [facility] 02/25/2016    KATELIN (obstructive sleep apnea) 12/19/2014    Essential hypertension 11/16/2011     Diagnostic studies:   ECG 4/9/21  Sinus bradycardia    MASSIMO 1/20/2021      Summary   Normal left ventricular cavity size and wall thickness. Global left ventricular function is normal with ejection fraction estimated   from 50 % to 55 %. No regional wall motion abnormalities noted. There is no evidence of mass or thrombus in the left atrium or appendage. Limited echo 9/14/2020   Summary   -Limited exam per Dr. King Parmar. Patient had a MASSIMO 5/2020.   -Normal left ventricle size, wall thickness, and systolic function with an   estimated ejection fraction of 55%.   -No regional wall motion abnormalities are seen.   -Trivial mitral regurgitation    I independently reviewed the cardiac diagnostic studies, ECG and relevant imaging studies.      Lab Results   Component Value Date    LVEF 53 01/20/2021     Lab Results   Component Value Date    TSH 2.03 06/17/2020         Physical Examination:  Vitals: 04/09/21 1016   BP: 121/75   Pulse: 55   Resp: 20      Wt Readings from Last 3 Encounters:   04/09/21 247 lb (112 kg)   02/17/21 255 lb 6.4 oz (115.8 kg)   01/20/21 230 lb (104.3 kg)       · Constitutional: Oriented. No distress. · Head: Normocephalic and atraumatic. · Mouth/Throat: Oropharynx is clear and moist.   · Eyes: Conjunctivae normal. EOM are normal.   · Neck: Neck supple. No JVD present. · Cardiovascular: Normal rate, regular rhythm, S1&S2. · Pulmonary/Chest: Bilateral respiratory sounds. No rhonchi. · Abdominal: Soft. No tenderness. · Musculoskeletal: No tenderness. No edema    · Lymphadenopathy: Has no cervical adenopathy. · Neurological: Alert and oriented. Follows command, No Gross deficit   · Skin: Skin is warm, No rash noted. · Psychiatric: Has a normal behavior       Review of System:  [x] Full ROS obtained and negative except as mentioned in HPI    Prior to Admission medications    Medication Sig Start Date End Date Taking? Authorizing Provider   clonazePAM (KLONOPIN) 0.5 MG tablet nightly as needed. 3/15/21  Yes Historical Provider, MD   pantoprazole (PROTONIX) 40 MG tablet Take 1 tablet by mouth every morning (before breakfast) 1/20/21  Yes David Burch MD   rivaroxaban (XARELTO) 20 MG TABS tablet Take 1 tablet by mouth daily (with breakfast) 1/20/21  Yes David Burch MD   metoprolol (LOPRESSOR) 100 MG tablet TAKE 1 TABLET BY MOUTH TWICE DAILY 12/18/20  Yes Cora Galdamez MD   dilTIAZem (CARDIZEM CD) 180 MG extended release capsule Take 1 capsule by mouth daily 10/6/20  Yes David Burch MD   fexofenadine (ALLEGRA) 180 MG tablet Take 180 mg by mouth daily   Yes Historical Provider, MD   carbidopa-levodopa (SINEMET)  MG per tablet Take 3 tablets by mouth 4 times daily  Patient taking differently: Take 3 tablets by mouth 4 times daily Pt takes at [id number] 2/6/20  Yes EMILIA Rodríguez - CNP       No Known Allergies    Social History:  Reviewed. reports that he quit smoking about 24 years ago. He has a 15.00 pack-year smoking history. He has never used smokeless tobacco. He reports previous alcohol use of about 20.0 standard drinks of alcohol per week. He reports previous drug use. Drug: Marijuana. Family History:  Reviewed. Reviewed. No family history of SCD. Relevant and available labs, and cardiovascular diagnostics reviewed. Reviewed. I independently reviewed relevant and available cardiac diagnostic tests ECG, CXR, Echo, Stress test, Device interrogation, Holter, CT scan. - The patient is counseled to follow a low salt diet to assure blood pressure remains controlled for cardiovascular risk factor modification.   - The patient is counseled to avoid excess caffeine, and energy drinks as this may exacerbated ectopy and arrhythmia. - The patient is counseled to get regular exercise 3-5 times per week to control cardiovascular risk factors. - The patient is counseled to lose weigt to control cardiovascular risk factors. - The patient is counseled to avoid tobacco use. All questions and concerns were addressed to the patient/family. Alternatives to my treatment were discussed. I have discussed the above stated plan and the patient verbalized understanding and agreed with the plan. Scribe attestation: This note was scribed in the presence of Adolfo Cavaanugh MD by Gala Jones RN  Physician Attestation: I, Dr. Adolfo Cavanaugh, confirm that the scribe's documentation has been prepared under my direction and personally reviewed by me in its entirety. I also confirm that the note above accurately reflects all work, treatment, procedures, and medical decision making performed by me. NOTE: This report was transcribed using voice recognition software. Every effort was made to ensure accuracy, however, inadvertent computerized transcription errors may be present.      Adolfo Cavanaugh MD, MPH  [place] [address]   Office: [phone]  Fax: [phone]

## 2021-04-09 ENCOUNTER — OFFICE VISIT (OUTPATIENT)
Dept: CARDIOLOGY CLINIC | Age: 61
End: 2021-04-09
Payer: MEDICARE

## 2021-04-09 VITALS
HEART RATE: 55 BPM | DIASTOLIC BLOOD PRESSURE: 75 MMHG | RESPIRATION RATE: 20 BRPM | BODY MASS INDEX: 33.46 KG/M2 | SYSTOLIC BLOOD PRESSURE: 121 MMHG | WEIGHT: 247 LBS | HEIGHT: 72 IN

## 2021-04-09 DIAGNOSIS — I48.0 PAF (PAROXYSMAL ATRIAL FIBRILLATION) (HCC): Primary | ICD-10-CM

## 2021-04-09 PROCEDURE — G8417 CALC BMI ABV UP PARAM F/U: HCPCS | Performed by: INTERNAL MEDICINE

## 2021-04-09 PROCEDURE — 93246 EXT ECG>7D<15D RECORDING: CPT | Performed by: INTERNAL MEDICINE

## 2021-04-09 PROCEDURE — 93248 EXT ECG>7D<15D REV&INTERPJ: CPT | Performed by: INTERNAL MEDICINE

## 2021-04-09 PROCEDURE — 1036F TOBACCO NON-USER: CPT | Performed by: INTERNAL MEDICINE

## 2021-04-09 PROCEDURE — 3017F COLORECTAL CA SCREEN DOC REV: CPT | Performed by: INTERNAL MEDICINE

## 2021-04-09 PROCEDURE — 93000 ELECTROCARDIOGRAM COMPLETE: CPT | Performed by: INTERNAL MEDICINE

## 2021-04-09 PROCEDURE — 99214 OFFICE O/P EST MOD 30 MIN: CPT | Performed by: INTERNAL MEDICINE

## 2021-04-09 PROCEDURE — G8427 DOCREV CUR MEDS BY ELIG CLIN: HCPCS | Performed by: INTERNAL MEDICINE

## 2021-04-09 RX ORDER — CLONAZEPAM 0.5 MG/1
TABLET ORAL NIGHTLY PRN
COMMUNITY
Start: 2021-03-15

## 2021-04-28 ENCOUNTER — TELEPHONE (OUTPATIENT)
Dept: CARDIOLOGY CLINIC | Age: 61
End: 2021-04-28

## 2021-06-07 ENCOUNTER — TELEPHONE (OUTPATIENT)
Dept: CARDIOLOGY CLINIC | Age: 61
End: 2021-06-07

## 2021-06-18 ENCOUNTER — OFFICE VISIT (OUTPATIENT)
Dept: INTERNAL MEDICINE CLINIC | Age: 61
End: 2021-06-18
Payer: MEDICARE

## 2021-06-18 VITALS
RESPIRATION RATE: 20 BRPM | OXYGEN SATURATION: 98 % | SYSTOLIC BLOOD PRESSURE: 132 MMHG | TEMPERATURE: 96.6 F | WEIGHT: 246 LBS | BODY MASS INDEX: 33.32 KG/M2 | DIASTOLIC BLOOD PRESSURE: 68 MMHG | HEART RATE: 50 BPM | HEIGHT: 72 IN

## 2021-06-18 DIAGNOSIS — G20 PARKINSON'S DISEASE (HCC): ICD-10-CM

## 2021-06-18 DIAGNOSIS — F33.1 MODERATE EPISODE OF RECURRENT MAJOR DEPRESSIVE DISORDER (HCC): Chronic | ICD-10-CM

## 2021-06-18 DIAGNOSIS — I47.1 ATRIAL TACHYCARDIA (HCC): Primary | ICD-10-CM

## 2021-06-18 DIAGNOSIS — M18.11 OSTEOARTHRITIS OF CARPOMETACARPAL (CMC) JOINT OF RIGHT THUMB, UNSPECIFIED OSTEOARTHRITIS TYPE: ICD-10-CM

## 2021-06-18 PROCEDURE — G8417 CALC BMI ABV UP PARAM F/U: HCPCS | Performed by: INTERNAL MEDICINE

## 2021-06-18 PROCEDURE — 3017F COLORECTAL CA SCREEN DOC REV: CPT | Performed by: INTERNAL MEDICINE

## 2021-06-18 PROCEDURE — 99213 OFFICE O/P EST LOW 20 MIN: CPT | Performed by: INTERNAL MEDICINE

## 2021-06-18 PROCEDURE — G8427 DOCREV CUR MEDS BY ELIG CLIN: HCPCS | Performed by: INTERNAL MEDICINE

## 2021-06-18 PROCEDURE — 1036F TOBACCO NON-USER: CPT | Performed by: INTERNAL MEDICINE

## 2021-06-18 RX ORDER — MELOXICAM 7.5 MG/1
TABLET ORAL
Qty: 30 TABLET | Refills: 5 | Status: SHIPPED | OUTPATIENT
Start: 2021-06-18 | End: 2021-12-07

## 2021-06-18 SDOH — ECONOMIC STABILITY: FOOD INSECURITY: WITHIN THE PAST 12 MONTHS, THE FOOD YOU BOUGHT JUST DIDN'T LAST AND YOU DIDN'T HAVE MONEY TO GET MORE.: NEVER TRUE

## 2021-06-18 SDOH — ECONOMIC STABILITY: FOOD INSECURITY: WITHIN THE PAST 12 MONTHS, YOU WORRIED THAT YOUR FOOD WOULD RUN OUT BEFORE YOU GOT MONEY TO BUY MORE.: NEVER TRUE

## 2021-06-18 ASSESSMENT — PATIENT HEALTH QUESTIONNAIRE - PHQ9
2. FEELING DOWN, DEPRESSED OR HOPELESS: 0
SUM OF ALL RESPONSES TO PHQ9 QUESTIONS 1 & 2: 0
SUM OF ALL RESPONSES TO PHQ QUESTIONS 1-9: 0
1. LITTLE INTEREST OR PLEASURE IN DOING THINGS: 0

## 2021-06-18 ASSESSMENT — SOCIAL DETERMINANTS OF HEALTH (SDOH): HOW HARD IS IT FOR YOU TO PAY FOR THE VERY BASICS LIKE FOOD, HOUSING, MEDICAL CARE, AND HEATING?: NOT HARD AT ALL

## 2021-06-18 NOTE — PROGRESS NOTES
Chief Complaint   Patient presents with    Hypertension     Vitals:    06/18/21 0919   BP: 132/68   Pulse: 50   Resp: 20   Temp: 96.6 °F (35.9 °C)   SpO2: 98%     PHQ Scores 6/18/2021 12/18/2020 6/17/2020 10/24/2018 9/26/2018 8/23/2018 8/18/2017   PHQ2 Score 0 0 0 2 3 4 0   PHQ9 Score 0 0 0 10 13 17 0     Interpretation of Total Score Depression Severity: 1-4 = Minimal depression, 5-9 = Mild depression, 10-14 = Moderate depression, 15-19 = Moderately severe depression, 20-27 = Severe depression    Physical Exam  Vitals reviewed. Constitutional:       General: He is not in acute distress. Appearance: He is well-developed. He is not diaphoretic. HENT:      Head: Normocephalic and atraumatic. Pulmonary:      Effort: Pulmonary effort is normal.   Neurological:      Mental Status: He is alert and oriented to person, place, and time. Cranial Nerves: No cranial nerve deficit. Psychiatric:         Behavior: Behavior normal.         Thought Content: Thought content normal.         Judgment: Judgment normal.       Assessment/Plan:  Ignacio ca was seen today for hypertension. Diagnoses and all orders for this visit:    Atrial tachycardia ([address]  Comments:  Chronic, controlled. Moderate episode of recurrent major depressive disorder (HCC)  Comments:  Chronic, controlled. Osteoarthritis of carpometacarpal (CMC) joint of right thumb, unspecified osteoarthritis type  -     meloxicam (MOBIC) 7.5 MG tablet; Take 1 tablet daily for 14 days, then off until pain returns    Parkinson's disease [facility])  Comments:  He is taking his medication as prescribed.          Stephanie Stapleton MD  FACP

## 2021-07-09 ENCOUNTER — TELEPHONE (OUTPATIENT)
Dept: CARDIOLOGY CLINIC | Age: 61
End: 2021-07-09

## 2021-07-09 NOTE — TELEPHONE ENCOUNTER
Call placed to patient 4 times each time no answer no voicemail. Tried calling [facility] phone is not in service. Samples are at the .

## 2021-07-09 NOTE — TELEPHONE ENCOUNTER
Spoke with the patient and advised him that samples are ready up front . Patient voiced understanding .  Call complete

## 2021-07-09 NOTE — TELEPHONE ENCOUNTER
Sample requested: xarelto    Strength: 20    Dosage:     Patient's call back number: only has 3 pills left . Would like to  today .

## 2021-08-31 NOTE — TELEPHONE ENCOUNTER
Sample requested: Briana Herzog        Strength:   20 MG     Dosage:  Daily     Patient's call back number: [phone]    PT HAS- DOSES LEFT

## 2021-09-11 PROBLEM — D75.89 MACROCYTOSIS WITHOUT ANEMIA: Chronic | Status: RESOLVED | Noted: 2017-08-18 | Resolved: 2021-09-11

## 2021-09-14 NOTE — PROGRESS NOTES
Cardiac Electrophysiology   Date: 9/15/2021     Chief Complaint:   Chief Complaint   Patient presents with    6 Month Follow-Up    Atrial Fibrillation        HPI: Abhishek Ventura is a 57 y.o. patient with a history of of atrial fibrillation, KATELIN, HTN, and parkinson's disease.      He also has history of prior atrial fibrillation/flutter ablation in late 90's at  by Dr. Luis Jackson. 10/28/2015 Cardioversion. Dr. Luba Owens      2/25/16, he had PVI and CAFE ablation with restoration of sinus rhythm with ablation. He returned with recurrent atrial fibrillation after being off Propafenone and was cardioverted on 4/6/16.      He presented to [facility] ED on 9/2020 with complaints of sudden palpitations while watching TV.     Admitted on 9/2020 with atrial fibrillation and RVR. He stated that his afib was worsening in the past few months prior to ablation. He had cardioversion on 8/20/2020 prior to admission.     S/p RFCA with PVI, focal left sided atrial tachycardia, and roof line (1/20/21).       Holter Monitor 04/09/2021 to 04/23/2021 Sinus rhythm, PAC burden 0.01%, no atrial fibrillation. 1st degree bloc. Max , Min HR 42, Average HR 63     Interval History: Today, Jesse Mullins presents for routine follow up. He is doing well from a cardiac perspective. He complains of dry skin and bruising from his xarelto. He was diagnosed with Parkinsons 4 years ago. He is having trouble sleeping. He is seeing a neurologist for his Parkinsons.      Past Medical History:   Diagnosis Date    Anxiety     Atrial fibrillation ([address]     Atrial flutter (HCC)     Cervical arthritis 3/30/2012    CTS (carpal tunnel syndrome) 3/13/2013    Essential tremor 2017    Hypertension     KATELIN (obstructive sleep apnea)     DOES NOT USE C-PAP    Osteoarthritis of back 11/6/2014    Osteoarthritis of right hip 11/6/2014    Parkinson disease [facility]     S/P ablation of atrial fibrillation 2/25/2016    Radiofrequency ablation of atrial fibrillation and pulmonary veins isolation Additional ablation of complex atrial fractionated electrogram     Sinusitis     Trigger finger 12/10/2014      Past Surgical History:   Procedure Laterality Date    ANKLE SURGERY  2015    ANKLE SURGERY Left     ATRIAL ABLATION SURGERY  1998    ATRIAL ABLATION SURGERY  2/25/16    RFCA of AF and PVI, additional ablation complex atrial fractioned electrogram    BACK SURGERY  1984    lumbar patino    CARDIOVERSION  4/6/16    CARPAL TUNNEL RELEASE Right 11/19/2013    RIGHT CARPAL TUNNEL RELEASE      CARPAL TUNNEL RELEASE Left 12-10-13     CARPAL TUNNEL RELEASE                COLONOSCOPY  10/29/2007    COLONOSCOPY N/A 7/30/2020    COLONOSCOPY POLYPECTOMY SNARE/COLD BIOPSY performed by Charu De La Cruz MD at [facility]    SALIVARY GLAND SURGERY      TOE SURGERY      TOTAL HIP ARTHROPLASTY Right 2016    right hip replacement       Allergies:  No Known Allergies    Medication:   Prior to Admission medications    Medication Sig Start Date End Date Taking? Authorizing Provider   metoprolol (LOPRESSOR) 100 MG tablet TAKE 1 TABLET BY MOUTH TWICE DAILY 6/28/21  Yes Susana Bennett MD   clonazePAM (KLONOPIN) 0.5 MG tablet nightly as needed.   3/15/21  Yes Historical Provider, MD   rivaroxaban (XARELTO) 20 MG TABS tablet Take 1 tablet by mouth daily (with breakfast) 1/20/21  Yes Rody Alcala MD   dilTIAZem (CARDIZEM CD) 180 MG extended release capsule Take 1 capsule by mouth daily 10/6/20  Yes Rody Alcala MD   fexofenadine (ALLEGRA) 180 MG tablet Take 180 mg by mouth daily   Yes Historical Provider, MD   carbidopa-levodopa (SINEMET)  MG per tablet Take 3 tablets by mouth 4 times daily  Patient taking differently: Take 3 tablets by mouth Taking 3 tablets by mouth 5 times daily 2/6/20  Yes Debbra Frankel, APRN - CNP   meloxicam (MOBIC) 7.5 MG tablet Take 1 tablet daily for 14 days, then off until pain returns  Patient not taking: Reported on 9/15/2021 6/18/21   Aura Larry MD       Social History:   reports that he quit smoking about 25 years ago. He has a 15.00 pack-year smoking history. He has never used smokeless tobacco. He reports previous alcohol use of about 20.0 standard drinks of alcohol per week. He reports previous drug use. Drug: Marijuana. Family History:  family history includes Alcohol Abuse in his father; High Blood Pressure in his mother; High Cholesterol in his mother; Other in an other family member. Reviewed. Denies family history of sudden cardiac death, arrhythmia, premature CAD    Review of System:    · General ROS: negative for - chills, fever   · Psychological ROS: negative for - anxiety or depression  · Ophthalmic ROS: negative for - eye pain or loss of vision  · ENT ROS: negative for - epistaxis, headaches, nasal discharge, sore throat   · Allergy and Immunology ROS: negative for - hives, nasal congestion   · Hematological and Lymphatic ROS: negative for - bleeding problems, blood clots, bruising or jaundice  · Endocrine ROS: negative for - skin changes, temperature intolerance or unexpected weight changes  · Respiratory ROS: negative for - cough, hemoptysis, pleuritic pain, SOB, sputum changes or wheezing  · Cardiovascular ROS: Per HPI. · Gastrointestinal ROS: negative for - abdominal pain, blood in stools, diarrhea, hematemesis, melena, nausea/vomiting or swallowing difficulty/pain  · Genito-Urinary ROS: negative for - dysuria or incontinence  · Musculoskeletal ROS: negative for - joint swelling or muscle pain  · Neurological ROS: negative for - confusion, dizziness, gait disturbance, headaches, numbness/tingling, seizures, speech problems, tremors, visual changes or weakness  · Dermatological ROS: negative for - rash    Physical Examination:  BP (!) 140/82   Pulse 60   Ht 6' (1.829 m)   Wt 245 lb 3.2 oz (111.2 kg)   SpO2 96%   BMI 33.26 kg/m²      · Constitutional: Oriented. No distress.    · Head: Normocephalic and atraumatic. · Mouth/Throat: Oropharynx is clear and moist.   · Eyes: Conjunctivae normal. EOM are normal.   · Neck: Normal range of motion. Neck supple. No rigidity. No JVD present. · Cardiovascular: Normal rate, regular rhythm, S1&S2 and intact distal pulses. · Pulmonary/Chest: Bilateral respiratory sounds. No wheezes. No rhonchi. · Abdominal: Soft. Bowel sounds present. No distension, No tenderness. · Musculoskeletal: No tenderness. No edema    · Lymphadenopathy: Has no cervical adenopathy. · Neurological: Alert and oriented. Cranial nerve appears intact, No Gross deficit   · Skin: Skin is warm and dry. No rash noted. · Psychiatric: Has a normal mood, affect and behavior     Labs:  Reviewed. ECG: personally reviewed and interpreted. Sinus bradycardia, first degree AV delay, . QRS and QTc normal.     Studies:   1. Event monitor: Holter Monitor 04/09/2021 to 04/23/2021 Sinus rhythm, PAC burden 0.01%, no atrial fibrillation. 1st degree block. Max , Min HR 42, Average HR 63     24 holter 12/07/2015 - atrial fibrillation 99.6 percent. Ventricular ectopy believed to be aberation. 2. Echo: 01/20/2021   Conclusions      Summary   Normal left ventricular cavity size and wall thickness. Global left ventricular function is normal with ejection fraction estimated   from 50 % to 55 %. No regional wall motion abnormalities noted. There is no evidence of mass or thrombus in the left atrium or appendage. 09/14/2020    Summary   -Limited exam per Dr. Seymour Thomas. Patient had a MASSIMO 5/2020.   -Normal left ventricle size, wall thickness, and systolic function with an   estimated ejection fraction of 55%.   -No regional wall motion abnormalities are seen.   -Trivial mitral regurgitation. 3. Stress Test:  none documented       4.  Cath: none documeted    I independently reviewed the ECG, MCOT, echocardiogram, stress test, and coronary angiography/PCI results and used them for my plan of care. [id number] Score for Atrial Fibrillation Stroke Risk   Risk   Factors  Component Value   C CHF No 0   H HTN Yes 1   A2 Age >= 76 No,  (63 y.o.) 0   D DM No 0   S2 Prior Stroke/TIA No 0   V Vascular Disease No 0   A Age 74-69 No,  (63 y.o.) 0   Sc Sex male 0    [id number]  Score  1   Score last updated 9/14/21 2:25 PM EDT    Click here for a link to the UpToDate guideline \"Atrial Fibrillation: Anticoagulation therapy to prevent embolization    Disclaimer: Risk Score calculation is dependent on accuracy of patient problem list and past encounter diagnosis. Assessment/Plan:      Paroxysmal Atrial Fibrillation    EKG today Sinus Bradycardia rate 57   Holter  04/2021 showed sinus rhythm, no atrial fibrillation      -S/p RFCA with PVI, left sided atrial tachycardia and roof line  01/20/2021 Dr. Fatemeh Juárez    -S/p DCCV for recurrent tachycardia 04/06/2016    Dr. Fatemeh Juárez    -S/p RFA of atrial fibrillation with pulmonary veins isolations. Dr. Fatemeh Juárez  02/25/2016    -S/p DCCV 10/28/2015 recurrent Afib- D.r Naveedorn      - continue Lopressor 100 mg BID   - continue Cardizem 180 mg Daily    - continue xarelto 20 mg daily    ~ creatinine clearance of 173    Ml/min based on creatinine of  0.7 01/20/2021      Mr. Anupama Tavera has a strong preference to be off of Xarelto. His stroke risk score is low based on CHADSVASC of one and he has no afib on his last monitor. We discussed that though his risk is low, there is still some risk of stroke. He also pointed out that when he goes into afib, he is symptomatic and would notify us. He can come off the xarelto for now. May  need to restart if recurrent or if he develops other risk factors or when he turns 71. We discussed risk of stroke and he is okay with the risk. - Discussed possible Loop recorder but not interested at this time.  Probably in six months after another repeat event monitor.    - Discussed CPAP for treatment of KATELIN to help minimize afib recurrence. HTN  - Borderline   -BP goal <130/80  -Home BP monitoring encouraged, printed information provided on how to accurately measure BP at home.  -Counseled to follow a low salt diet to assure blood pressure remains controlled for cardiovascular risk factor modification.   -The patient is counseled to get regular exercise 3-5 times per week and maintain a healthy weight reduce cardiovascular risk factors. Lopressor 100 mg BID   Follows with PCP    KATELIN   He  states he has not had a CPAP for 8-10 years. He has trouble tolerating the mask. He State she sleeps 2-4 hours each night. Refer to sleep medicine. We discussed  afib as a risk for afib    Obesity  Body mass index is 33.26 kg/m². -Excessive weight is complicating assessment and treatment. It is placing patient at risk for multiple co-morbidities as well as early death and contributing to the patient's presentation.  -Discussed weight management with diet and exercise        Plan:   · Stop xarelto   · f/u in 6 months   · Consider loop recorder to monitor recurrence  · Encouraged to re-establish with sleep medicine. Thank you for allowing me to participate in the care of [address] [place] [address]. All questions and concerns were addressed to the patient/family. Alternatives to my treatment were discussed. Scribe attestation: This note was scribed in the presence of Celine Moore MD, by Bello Garcia RN    Physician attestation: The scribe's documentation has been prepared under my direction and has been personally reviewed by me in its entirety. I confirm that the note above reflects all work, treatment, procedures, and medical decision making performed by me.      Celine Moore MD  Cardiac Electrophysiology  [facility]

## 2021-09-15 ENCOUNTER — OFFICE VISIT (OUTPATIENT)
Dept: CARDIOLOGY CLINIC | Age: 61
End: 2021-09-15
Payer: MEDICARE

## 2021-09-15 VITALS
HEART RATE: 60 BPM | DIASTOLIC BLOOD PRESSURE: 82 MMHG | OXYGEN SATURATION: 96 % | WEIGHT: 245.2 LBS | HEIGHT: 72 IN | BODY MASS INDEX: 33.21 KG/M2 | SYSTOLIC BLOOD PRESSURE: 140 MMHG

## 2021-09-15 DIAGNOSIS — I48.0 PAF (PAROXYSMAL ATRIAL FIBRILLATION) (HCC): Primary | ICD-10-CM

## 2021-09-15 DIAGNOSIS — G47.33 OSA (OBSTRUCTIVE SLEEP APNEA): ICD-10-CM

## 2021-09-15 DIAGNOSIS — E66.9 OBESITY (BMI 30.0-34.9): ICD-10-CM

## 2021-09-15 DIAGNOSIS — I10 ESSENTIAL HYPERTENSION: Chronic | ICD-10-CM

## 2021-09-15 PROCEDURE — 93000 ELECTROCARDIOGRAM COMPLETE: CPT | Performed by: INTERNAL MEDICINE

## 2021-09-15 PROCEDURE — 99214 OFFICE O/P EST MOD 30 MIN: CPT | Performed by: INTERNAL MEDICINE

## 2021-09-30 RX ORDER — DILTIAZEM HYDROCHLORIDE 180 MG/1
180 CAPSULE, COATED, EXTENDED RELEASE ORAL DAILY
Qty: 90 CAPSULE | Refills: 3 | Status: SHIPPED | OUTPATIENT
Start: 2021-09-30 | End: 2022-09-12

## 2021-12-07 ENCOUNTER — OFFICE VISIT (OUTPATIENT)
Dept: PULMONOLOGY | Age: 61
End: 2021-12-07
Payer: MEDICARE

## 2021-12-07 VITALS
HEART RATE: 47 BPM | SYSTOLIC BLOOD PRESSURE: 98 MMHG | WEIGHT: 241.4 LBS | OXYGEN SATURATION: 98 % | BODY MASS INDEX: 32.7 KG/M2 | DIASTOLIC BLOOD PRESSURE: 68 MMHG | HEIGHT: 72 IN

## 2021-12-07 DIAGNOSIS — G47.52 RBD (REM BEHAVIORAL DISORDER): ICD-10-CM

## 2021-12-07 DIAGNOSIS — I48.0 PAF (PAROXYSMAL ATRIAL FIBRILLATION) (HCC): Chronic | ICD-10-CM

## 2021-12-07 DIAGNOSIS — E66.9 NON MORBID OBESITY, UNSPECIFIED OBESITY TYPE: Chronic | ICD-10-CM

## 2021-12-07 DIAGNOSIS — I10 ESSENTIAL HYPERTENSION: Chronic | ICD-10-CM

## 2021-12-07 DIAGNOSIS — G20 PARKINSON'S DISEASE (HCC): Chronic | ICD-10-CM

## 2021-12-07 DIAGNOSIS — G47.33 OBSTRUCTIVE SLEEP APNEA (ADULT) (PEDIATRIC): Primary | ICD-10-CM

## 2021-12-07 DIAGNOSIS — F33.1 MODERATE EPISODE OF RECURRENT MAJOR DEPRESSIVE DISORDER (HCC): Chronic | ICD-10-CM

## 2021-12-07 PROBLEM — G20.A1 PARKINSON'S DISEASE: Chronic | Status: ACTIVE | Noted: 2020-08-19

## 2021-12-07 PROCEDURE — 99204 OFFICE O/P NEW MOD 45 MIN: CPT | Performed by: INTERNAL MEDICINE

## 2021-12-07 PROCEDURE — G8427 DOCREV CUR MEDS BY ELIG CLIN: HCPCS | Performed by: INTERNAL MEDICINE

## 2021-12-07 PROCEDURE — 1036F TOBACCO NON-USER: CPT | Performed by: INTERNAL MEDICINE

## 2021-12-07 PROCEDURE — 3017F COLORECTAL CA SCREEN DOC REV: CPT | Performed by: INTERNAL MEDICINE

## 2021-12-07 PROCEDURE — G8484 FLU IMMUNIZE NO ADMIN: HCPCS | Performed by: INTERNAL MEDICINE

## 2021-12-07 PROCEDURE — G8417 CALC BMI ABV UP PARAM F/U: HCPCS | Performed by: INTERNAL MEDICINE

## 2021-12-07 ASSESSMENT — SLEEP AND FATIGUE QUESTIONNAIRES
HOW LIKELY ARE YOU TO NOD OFF OR FALL ASLEEP WHILE SITTING AND TALKING TO SOMEONE: 0
HOW LIKELY ARE YOU TO NOD OFF OR FALL ASLEEP WHILE WATCHING TV: 2
HOW LIKELY ARE YOU TO NOD OFF OR FALL ASLEEP WHEN YOU ARE A PASSENGER IN A CAR FOR AN HOUR WITHOUT A BREAK: 0
HOW LIKELY ARE YOU TO NOD OFF OR FALL ASLEEP WHILE SITTING QUIETLY AFTER LUNCH WITHOUT ALCOHOL: 0
HOW LIKELY ARE YOU TO NOD OFF OR FALL ASLEEP IN A CAR, WHILE STOPPED FOR A FEW MINUTES IN TRAFFIC: 0
ESS TOTAL SCORE: 7
HOW LIKELY ARE YOU TO NOD OFF OR FALL ASLEEP WHILE LYING DOWN TO REST IN THE AFTERNOON WHEN CIRCUMSTANCES PERMIT: 2
HOW LIKELY ARE YOU TO NOD OFF OR FALL ASLEEP WHILE SITTING AND READING: 0
NECK CIRCUMFERENCE (INCHES): 18
HOW LIKELY ARE YOU TO NOD OFF OR FALL ASLEEP WHILE SITTING INACTIVE IN A PUBLIC PLACE: 3

## 2021-12-07 NOTE — PROGRESS NOTES
Marquis Rausch MD, Kalyn Lorenzo, [facility]  Tiffanie Kehrt CNP Jeraline Saunas CNP [address]  Jorge Vigil [address] [facility], [address] [facility] [phone]   [facility] [address] Jorge Vigil. [address] [place] [address]. Ifeanyi Adams [address] [phone]     [address] [place], [place] [address] [address] [place] [address]  Dept: [phone]  Loc: [phone]    Assessment:      Visit Diagnoses and Associated Orders     Obstructive sleep apnea (adult) (pediatric)   (New Problem)  -  Primary    needs work-up    Baseline Diagnostic Sleep Study [[id number] Custom]   - Future Order         RBD (REM behavioral disorder)   (New Problem)      needs work-up    Baseline Diagnostic Sleep Study [[id number] Custom]   - Future Order         PAF (paroxysmal atrial fibrillation) (HCC)   (Stable)           Essential hypertension   (Stable)           Parkinson's disease (HCC)   (Stable)      Baseline Diagnostic Sleep Study [[id number] Custom]   - Future Order         Moderate episode of recurrent major depressive disorder (HCC)   (Stable)           Non morbid obesity, unspecified obesity type   (Not Stable)                  Plan:      Since no old records are available the patient will need repeat testing. Given the diagnosis REM behavior disorder and Parkinson's the patient needs an in lab study. Reviewed KATELIN (highest likelihood Dx): pathophysiology, diagnosis, complications and treatment. Instructed him not to drive if drowsy. Continue medications per her PCP and other physicians. Limit caffeine use after 3pm. Standard of care is to do in-lab PSG but insurance is mandating an inferior HST. 1 wk follow up after study to review his results. The chronic medical conditions listed are directly related to the primary diagnosis listed above. The management of the primary diagnosis affects the secondary diagnosis and vice versa.     Referral note from patient's cardiologist dated 9/15/2021 showing the patient with a history of A. fib and possible sleep apnea. Personally reviewed EKG tracing from 9/15/2021 showing normal sinus rhythm. Echo from 2021 showed an EF of 55%. This information was analyzed to assess complexity and medical decision making in regards to further testing and management. Continue meds for: a fib, HTN, Parkinson's and depression. Pt would medically benefit from wt loss for KATELIN (diet, exercise, surgical). Orders Placed This Encounter   Procedures    Baseline Diagnostic Sleep Study          Subjective:     Patient ID: Salma Shepard is a 57 y.o. male. Chief Complaint   Patient presents with    Sleep Apnea       HPI:      Salma Shepard is a 57 y.o. male referred by Hamida Wilson MD for a sleep evaluation. He complains of: Diagnosed with sleep apnea over 10 years ago by his ENT at that time. No old records are available. Tried CPAP but was unable to tolerate. Has issues with snoring, nonrestorative sleep, nocturia, and daytime sleepiness. He has been diagnosed REM behavior disorder by neurology at [facility] and been on clonazepam 0.5 mg which seems to help with his movements at nighttime but they are still present at times. Previous Report(s) Reviewed: historical medical records, office notes, andreferral letter(s). Pertinent data has been documented.     [place] - Total score: 7    Caffeine Intake - Coffee: 1 cup(s) per day    Social History     Socioeconomic History    Marital status:      Spouse name: Not on file    Number of children: 1    Years of education: Not on file    Highest education level: Not on file   Occupational History    Occupation: Retired in      Comment: heavy equipment / [employer]    Tobacco Use    Smoking status: Former Smoker     Packs/day: 1.00     Years: 15.00     Pack years: 15.00     Quit date: 1996     Years since quittin.5    Smokeless tobacco: Never Used   Vaping Use    Vaping Use: Never used   Substance and Sexual Activity    Alcohol use: Not Currently     Alcohol/week: 20.0 standard drinks     Types: 20 Standard drinks or equivalent per week     Comment: weekends ; quit 09/2020    Drug use: Not Currently     Types: Marijuana Leandra Quang)     Comment: occ    Sexual activity: Not on file   Other Topics Concern    Not on file   Social History Narrative    G[facility] up in [facility]    No hx of abuse     in 2001 after 11 yrs of marriage    Has 1 daughter    Has 2 brothers, he is the middle child     Social Determinants of Health     Financial Resource Strain: Low Risk     Difficulty of Paying Living Expenses: Not hard at all   Food Insecurity: No Food Insecurity    Worried About [address] in the Last Year: Never true    [address] in the Last Year: Never true   Transportation Needs:     Lack of Transportation (Medical): Not on file    Lack of Transportation (Non-Medical):  Not on file   Physical Activity:     Days of Exercise per Week: Not on file    Minutes of Exercise per Session: Not on file   Stress:     Feeling of Stress : Not on file   Social Connections:     Frequency of Communication with Friends and Family: Not on file    Frequency of Social Gatherings with Friends and Family: Not on file    Attends Mu-ism Services: Not on file    Active Member of [address] [place] [address] or Organizations: Not on file    Attends Club or Organization Meetings: Not on file    Marital Status: Not on file   Intimate Partner Violence:     Fear of Current or Ex-Partner: Not on file    Emotionally Abused: Not on file    Physically Abused: Not on file    Sexually Abused: Not on file   Housing Stability:     Unable to Pay for Housing in the Last Year: Not on file    Number of [place] in the Last Year: Not on file    Unstable Housing in the Last Year: Not on file        Current Outpatient Medications   Medication Instructions    carbidopa-levodopa (SINEMET)  MG per tablet 3 tablets, Oral, 4 TIMES DAILY    clonazePAM (KLONOPIN) 0.5 MG tablet NIGHTLY PRN    dilTIAZem (CARDIZEM CD) 180 mg, Oral, DAILY    fexofenadine (ALLEGRA) 180 mg, Oral, DAILY    metoprolol (LOPRESSOR) 100 MG tablet TAKE 1 TABLET BY MOUTH TWICE DAILY          Objective:     Vitals:  Weight BMI   Wt Readings from Last 3 Encounters:   12/07/21 241 lb 6.4 oz (109.5 kg)   09/15/21 245 lb 3.2 oz (111.2 kg)   06/18/21 246 lb (111.6 kg)    Body mass index is 32.74 kg/m². BP HR SaO2   BP Readings from Last 3 Encounters:   12/07/21 98/68   09/15/21 (!) 140/82   06/18/21 132/68    Pulse Readings from Last 3 Encounters:   12/07/21 (!) 47   09/15/21 60   06/18/21 50    SpO2 Readings from Last 3 Encounters:   12/07/21 98%   09/15/21 96%   06/18/21 98%        Physical Exam  Vitals reviewed. Constitutional:       General: He is not in acute distress. Appearance: Normal appearance. He is well-developed. He is not toxic-appearing or diaphoretic. HENT:      Head: Normocephalic and atraumatic. Not macrocephalic and not microcephalic. Right Ear: External ear normal.      Left Ear: External ear normal.      Nose: Septal deviation and mucosal edema present. No nasal deformity. Mouth/Throat:      Lips: Pink. Mouth: Mucous membranes are moist.      Tongue: No lesions. Palate: No mass. Pharynx: Uvula midline. Uvula swelling present. No oropharyngeal exudate. Tonsils: No tonsillar exudate or tonsillar abscesses. Comments: Tonsils: normal size  Eyes:      General: Lids are normal.      Extraocular Movements: Extraocular movements intact. Conjunctiva/sclera: Conjunctivae normal.      Pupils: Pupils are equal, round, and reactive to light. Neck:      Vascular: No JVD. Trachea: Trachea normal.      Comments: Neck Circ: 18 inches    Cardiovascular:      Rate and Rhythm: Normal rate and regular rhythm. Heart sounds: Normal heart sounds. Pulmonary:      Effort: Pulmonary effort is normal.      Breath sounds: Normal breath sounds.    Abdominal: General: Bowel sounds are normal.   Musculoskeletal:      Cervical back: Normal range of motion. Comments: No evidence of cyanosis or clubbing of nails   Skin:     General: Skin is warm. Nails: There is no clubbing. Neurological:      Mental Status: He is alert. Psychiatric:         Attention and Perception: Attention normal.         Mood and Affect: Mood and affect normal.         Speech: Speech normal.         Behavior: Behavior normal. Behavior is cooperative. Thought Content:  Thought content normal.         Electronically signed by Stanislaw Ho MD on12/7/2021 at 2:05 PM

## 2021-12-07 NOTE — LETTER
[facility] Sleep Medicine  [id number] [facility]  [place] [facility] [address]  Phone: [phone]  Fax: [phone]           Cheri Everett MD      December 7, 2021     Patient: Brett Ramos   MR Number: [id number]   YOB: 1960   Date of Visit: 12/7/2021       Dear Dr. Felisa Ragsdale:    Thank you for referring Kalyn Oh to me for evaluation/treatment. Below are the relevant portions of my assessment and plan of care. Visit Diagnoses and Associated Orders     Obstructive sleep apnea (adult) (pediatric)   (New Problem)  -  Primary    needs work-up    Baseline Diagnostic Sleep Study [[id number] Custom]   - Future Order         RBD (REM behavioral disorder)   (New Problem)      needs work-up    Baseline Diagnostic Sleep Study [[id number] Custom]   - Future Order         PAF (paroxysmal atrial fibrillation) (HCC)   (Stable)           Essential hypertension   (Stable)           Parkinson's disease (HCC)   (Stable)      Baseline Diagnostic Sleep Study [[id number] Custom]   - Future Order         Moderate episode of recurrent major depressive disorder (HCC)   (Stable)           Non morbid obesity, unspecified obesity type   (Not Stable)                 Since no old records are available the patient will need repeat testing. Given the diagnosis REM behavior disorder and Parkinson's the patient needs an in lab study. Reviewed KATELIN (highest likelihood Dx): pathophysiology, diagnosis, complications and treatment. Instructed him not to drive if drowsy. Continue medications per her PCP and other physicians. Limit caffeine use after 3pm. Standard of care is to do in-lab PSG but insurance is mandating an inferior HST. 1 wk follow up after study to review his results. The chronic medical conditions listed are directly related to the primary diagnosis listed above. The management of the primary diagnosis affects the secondary diagnosis and vice versa.     Referral note from patient's cardiologist dated 9/15/2021

## 2021-12-09 ENCOUNTER — TELEPHONE (OUTPATIENT)
Dept: INTERNAL MEDICINE CLINIC | Age: 61
End: 2021-12-09

## 2021-12-09 ENCOUNTER — TELEPHONE (OUTPATIENT)
Dept: SLEEP CENTER | Age: 61
End: 2021-12-09

## 2021-12-09 NOTE — TELEPHONE ENCOUNTER
Called to schedule psg per Ron Jeff     Left vm for the pt to return my call    [facility] insurance   Not sure about shots

## 2021-12-09 NOTE — TELEPHONE ENCOUNTER
Spoke w/patient and suggested he contact the sleep center to discuss the time parameters required for his COVID testing/results. Once he confirmed that patient can go to any of the pharmacies or testing sites discussed.

## 2021-12-09 NOTE — TELEPHONE ENCOUNTER
----- Message from Rowland Lanes sent at 12/9/2021 11:19 AM EST -----  Subject: Referral Request    QUESTIONS   Reason for referral request? Pt needs to schedule a covid test before   12/29/21 sleep clinic appt. Has the physician seen you for this condition before? No   Preferred Specialist (if applicable)? Do you already have an appointment scheduled? No  Additional Information for Provider?   ---------------------------------------------------------------------------  --------------  CALL BACK INFO  What is the best way for the office to contact you? OK to leave message on   voicemail  Preferred Call Back Phone Number?  [phone]

## 2021-12-13 DIAGNOSIS — G47.33 OSA (OBSTRUCTIVE SLEEP APNEA): Primary | ICD-10-CM

## 2021-12-15 ENCOUNTER — OFFICE VISIT (OUTPATIENT)
Dept: INTERNAL MEDICINE CLINIC | Age: 61
End: 2021-12-15
Payer: MEDICARE

## 2021-12-15 VITALS
DIASTOLIC BLOOD PRESSURE: 72 MMHG | HEIGHT: 72 IN | SYSTOLIC BLOOD PRESSURE: 130 MMHG | OXYGEN SATURATION: 97 % | BODY MASS INDEX: 32.64 KG/M2 | TEMPERATURE: 98.1 F | WEIGHT: 241 LBS | HEART RATE: 51 BPM

## 2021-12-15 DIAGNOSIS — M10.9 ACUTE GOUT INVOLVING TOE OF LEFT FOOT, UNSPECIFIED CAUSE: ICD-10-CM

## 2021-12-15 DIAGNOSIS — M10.9 ACUTE GOUT INVOLVING TOE OF LEFT FOOT, UNSPECIFIED CAUSE: Primary | ICD-10-CM

## 2021-12-15 LAB
ALBUMIN SERPL-MCNC: 4.6 G/DL (ref 3.4–5)
ANION GAP SERPL CALCULATED.3IONS-SCNC: 12 MMOL/L (ref 3–16)
BASOPHILS ABSOLUTE: 0 K/UL (ref 0–0.2)
BASOPHILS RELATIVE PERCENT: 0.2 %
BUN BLDV-MCNC: 11 MG/DL (ref 7–20)
CALCIUM SERPL-MCNC: 10.3 MG/DL (ref 8.3–10.6)
CHLORIDE BLD-SCNC: 100 MMOL/L (ref 99–110)
CO2: 26 MMOL/L (ref 21–32)
CREAT SERPL-MCNC: 0.7 MG/DL (ref 0.8–1.3)
EOSINOPHILS ABSOLUTE: 0.4 K/UL (ref 0–0.6)
EOSINOPHILS RELATIVE PERCENT: 5.7 %
GFR AFRICAN AMERICAN: >60
GFR NON-AFRICAN AMERICAN: >60
GLUCOSE BLD-MCNC: 91 MG/DL (ref 70–99)
HCT VFR BLD CALC: 47.1 % (ref 40.5–52.5)
HEMOGLOBIN: 15.8 G/DL (ref 13.5–17.5)
LYMPHOCYTES ABSOLUTE: 1.4 K/UL (ref 1–5.1)
LYMPHOCYTES RELATIVE PERCENT: 21.2 %
MCH RBC QN AUTO: 31.8 PG (ref 26–34)
MCHC RBC AUTO-ENTMCNC: 33.5 G/DL (ref 31–36)
MCV RBC AUTO: 94.7 FL (ref 80–100)
MONOCYTES ABSOLUTE: 0.7 K/UL (ref 0–1.3)
MONOCYTES RELATIVE PERCENT: 10.2 %
NEUTROPHILS ABSOLUTE: 4 K/UL (ref 1.7–7.7)
NEUTROPHILS RELATIVE PERCENT: 62.7 %
PDW BLD-RTO: 15.9 % (ref 12.4–15.4)
PHOSPHORUS: 3.5 MG/DL (ref 2.5–4.9)
PLATELET # BLD: 198 K/UL (ref 135–450)
PMV BLD AUTO: 9.5 FL (ref 5–10.5)
POTASSIUM SERPL-SCNC: 5.6 MMOL/L (ref 3.5–5.1)
RBC # BLD: 4.97 M/UL (ref 4.2–5.9)
SODIUM BLD-SCNC: 138 MMOL/L (ref 136–145)
URIC ACID, SERUM: 5.2 MG/DL (ref 3.5–7.2)
WBC # BLD: 6.4 K/UL (ref 4–11)

## 2021-12-15 PROCEDURE — 99214 OFFICE O/P EST MOD 30 MIN: CPT | Performed by: INTERNAL MEDICINE

## 2021-12-15 PROCEDURE — G8417 CALC BMI ABV UP PARAM F/U: HCPCS | Performed by: INTERNAL MEDICINE

## 2021-12-15 PROCEDURE — G8484 FLU IMMUNIZE NO ADMIN: HCPCS | Performed by: INTERNAL MEDICINE

## 2021-12-15 PROCEDURE — 1036F TOBACCO NON-USER: CPT | Performed by: INTERNAL MEDICINE

## 2021-12-15 PROCEDURE — G8427 DOCREV CUR MEDS BY ELIG CLIN: HCPCS | Performed by: INTERNAL MEDICINE

## 2021-12-15 PROCEDURE — 3017F COLORECTAL CA SCREEN DOC REV: CPT | Performed by: INTERNAL MEDICINE

## 2021-12-15 RX ORDER — COLCHICINE 0.6 MG/1
0.6 CAPSULE ORAL DAILY
Qty: 30 CAPSULE | Refills: 0 | Status: SHIPPED | OUTPATIENT
Start: 2021-12-15 | End: 2022-03-15 | Stop reason: ALTCHOICE

## 2021-12-15 ASSESSMENT — PATIENT HEALTH QUESTIONNAIRE - PHQ9
SUM OF ALL RESPONSES TO PHQ9 QUESTIONS 1 & 2: 0
SUM OF ALL RESPONSES TO PHQ QUESTIONS 1-9: 0
SUM OF ALL RESPONSES TO PHQ QUESTIONS 1-9: 0
2. FEELING DOWN, DEPRESSED OR HOPELESS: 0
SUM OF ALL RESPONSES TO PHQ QUESTIONS 1-9: 0
1. LITTLE INTEREST OR PLEASURE IN DOING THINGS: 0

## 2021-12-15 NOTE — PROGRESS NOTES
Subjective:      Patient ID: Negin Tomlinson is a 57 y.o. male. Chief Complaint   Patient presents with    Swelling     swelling in big toe, has been swollen for 3 days       Toe Pain   Incident onset: 3 days ag. The incident occurred at home. There was no injury mechanism. Pain location: right toe. The quality of the pain is described as aching. The pain is at a severity of 4/10. The pain is moderate. The pain has been fluctuating since onset. Associated symptoms include numbness (big toe is numb at baseline). Pertinent negatives include no inability to bear weight, loss of motion, loss of sensation, muscle weakness or tingling. He reports no foreign bodies present. The symptoms are aggravated by movement and palpation. He has tried elevation for the symptoms. The treatment provided no relief. Review of Systems   Musculoskeletal: Positive for arthralgias and joint swelling. Neurological: Positive for numbness (big toe is numb at baseline). Negative for tingling. No Known Allergies    Current Outpatient Medications   Medication Sig Dispense Refill    metoprolol (LOPRESSOR) 100 MG tablet TAKE 1 TABLET BY MOUTH TWICE DAILY 60 tablet 1    dilTIAZem (CARDIZEM CD) 180 MG extended release capsule TAKE 1 CAPSULE BY MOUTH DAILY 90 capsule 3    clonazePAM (KLONOPIN) 0.5 MG tablet nightly as needed.  fexofenadine (ALLEGRA) 180 MG tablet Take 180 mg by mouth daily      carbidopa-levodopa (SINEMET)  MG per tablet Take 3 tablets by mouth 4 times daily (Patient taking differently: Take 3 tablets by mouth Taking 3 tablets by mouth 5 times daily) 120 tablet 1     No current facility-administered medications for this visit. Vitals:    12/15/21 1007   BP: 130/72   Site: Left Upper Arm   Position: Sitting   Pulse: 51   Temp: 98.1 °F (36.7 °C)   TempSrc: Temporal   SpO2: 97%   Weight: 241 lb (109.3 kg)   Height: 6' (1.829 m)     Body mass index is 32.69 kg/m².      Wt Readings from Last 3 Encounters:   12/15/21 241 lb (109.3 kg)   12/07/21 241 lb 6.4 oz (109.5 kg)   09/15/21 245 lb 3.2 oz (111.2 kg)     BP Readings from Last 3 Encounters:   12/15/21 130/72   12/07/21 98/68   09/15/21 (!) 140/82       Objective:   Physical Exam  Cardiovascular:      Pulses:           Dorsalis pedis pulses are 1+ on the right side and 1+ on the left side. Musculoskeletal:      Right foot: No deformity, foot drop or prominent metatarsal heads. Left foot: Decreased range of motion. Deformity present. No bunion, Charcot foot, foot drop or prominent metatarsal heads. Feet:    Feet:      Right foot:      Skin integrity: Skin integrity normal.      Toenail Condition: Right toenails are normal.      Left foot:      Skin integrity: Warmth present. Toenail Condition: Left toenails are abnormally thick. Assessment/Plan:  Katja Monson was seen today for swelling. Diagnoses and all orders for this visit:    Acute gout involving toe of left foot, unspecified cause  -     CBC WITH AUTO DIFFERENTIAL; Future  -     URIC ACID; Future  -     Renal Function Panel; Future      No follow-ups on file.       Mali Jones MD

## 2021-12-15 NOTE — PATIENT INSTRUCTIONS
Patient Education        Gout: Care Instructions  Overview     Gout is a form of arthritis caused by a buildup of uric acid crystals in a joint. It causes sudden attacks of pain, swelling, redness, and stiffness, usually in one joint, especially the big toe. Gout usually comes on without a cause. But it can be brought on by drinking alcohol (especially beer), eating or drinking things made with high-fructose corn syrup, or eating seafood or red meat. Taking certain medicines, such as diuretics, can also trigger an attack of gout. Taking your medicines as prescribed and following up with your doctor regularly can help you avoid gout attacks in the future. Follow-up care is a key part of your treatment and safety. Be sure to make and go to all appointments, and call your doctor if you are having problems. It's also a good idea to know your test results and keep a list of the medicines you take. How can you care for yourself at home? · If the joint is swollen, put ice or a cold pack on the area for 10 to 20 minutes at a time. Put a thin cloth between the ice and your skin. · Prop up the sore limb on a pillow when you ice it or anytime you sit or lie down during the next 3 days. Try to keep it above the level of your heart. This can help reduce swelling. · Rest sore joints. Avoid activities that put weight or strain on the joints for a few days. Take short rest breaks from your regular activities during the day. · Take your medicines exactly as prescribed. Call your doctor if you think you are having a problem with your medicine. · Take pain medicines exactly as directed. ? If the doctor gave you a prescription medicine for pain, take it as prescribed. ? If you are not taking a prescription pain medicine, ask your doctor if you can take an over-the-counter medicine. · Eat less seafood and red meat. · Avoid foods or drinks that are made with high-fructose corn syrup.   · Check with your doctor before drinking alcohol. · Losing weight, if you are overweight, may help reduce attacks of gout. But do not go on a diet that causes rapid weight loss. Losing a lot of weight in a short amount of time can cause a gout attack. When should you call for help? Call your doctor now or seek immediate medical care if:    · You have a fever.     · The joint is so painful you cannot use it.     · You have sudden, unexplained swelling, redness, warmth, or severe pain in one or more joints. Watch closely for changes in your health, and be sure to contact your doctor if:    · You have joint pain.     · Your symptoms get worse or are not improving after 2 or 3 days. Where can you learn more? Go to https://GTRAN.FitVia. org and sign in to your [employer] account. Enter Y899 in the [employer] box to learn more about \"Gout: Care Instructions. \"     If you do not have an account, please click on the \"Sign Up Now\" link. Current as of: April 30, 2021               Content Version: 13.0  © 6298-3621 [employer]. Care instructions adapted under license by [facility] ([facility]). If you have questions about a medical condition or this instruction, always ask your healthcare professional. [place] [address] any warranty or liability for your use of this information. Patient Education        colchicine  Pronunciation:  EARL bradley  Brand:  Briseida Amaro  What is the most important information I should know about colchicine? Serious drug interactions can occur when certain medicines are used together with colchicine. Tell each of your healthcare providers about all medicines you use now, and any medicine you start or stop using. What is colchicine? Colchicine affects the way the body responds to uric acid crystals, which reduces swelling and pain.   Because colchicine was developed prior to federal regulations requiring FDA review of all marketed drug products, not all uses for colchicine have been approved by the FDA. The Colcrys  brand of colchicine is FDA-approved to treat or prevent gout in adults, and to treat a genetic condition called Familial Mediterranean Fever in adults and children who are at least 3years old. The Mitigare brand of colchicine is FDA-approved to prevent gout flares in adults. Generic forms of colchicine have been used to treat or prevent attacks of gout, or to treat symptoms of Behcets syndrome (such as swelling, redness, warmth, and pain). Colchicine is not a cure for gouty arthritis or Behcets syndrome, and it will not prevent these diseases from progressing. Colchicine should not be used as a routine pain medication for other conditions. Colchicine may also be used for purposes not listed in this medication guide. What should I discuss with my healthcare provider before taking colchicine? You should not use colchicine if you are allergic to it. Some medicines can cause unwanted or dangerous effects when used with colchicine, especially if you have liver or kidney disease. Your doctor may need to change your treatment plan if you use any of the following drugs:  · cyclosporine;  · nefazodone;  · tipranavir;  · clarithromycin or telithromycin;  · itraconazole or ketoconazole; or  · HIV or AIDS medicine --atazanavir, darunavir, fosamprenavir, indinavir, lopinavir, nelfinavir, ritonavir, or saquinavir. To make sure colchicine is safe for you, tell your doctor if you have:  · liver disease;  · kidney disease; or  · if you take digoxin, or cholesterol-lowering medications. It is not known whether this medicine will harm an unborn baby. Tell your doctor if you are pregnant or plan to become pregnant. Colchicine can pass into breast milk and may harm a nursing baby. Tell your doctor if you are breast-feeding a baby. How should I take colchicine? Do not purchase colchicine on the Internet or from vendors outside of the [place].  Using this medication improperly or without the advice of a doctor can result in serious side effects or death. Follow all directions on your prescription label. Do not take this medicine in larger or smaller amounts or for longer than recommended. Colchicine can be taken with or without food. To treat a gout attack, for best results take colchicine at the first sign of the attack. The longer you wait to start taking the medication, the less effective it may be. You may need to take a second lower dose of colchicine 1 hour after the first dose if you still have gout pain. Follow your doctor's instructions. Your dose will depend on the reason you are taking this medicine. Colchicine doses for gout and Mediterranean fever are different. Do not stop using colchicine unless your doctor tells you to, even if you feel fine. Call your doctor if your symptoms do not improve, or if they get worse. If you use this medicine long-term, you may need frequent medical tests. Store at room temperature away from moisture, heat, and light. Keep the bottle tightly closed when not in use. What happens if I miss a dose? Take the missed dose as soon as you remember. Skip the missed dose if it is almost time for your next scheduled dose. Do not take extra medicine to make up the missed dose. What happens if I overdose? Seek emergency medical attention or call the Poison Help line at 1-810.637.3568. An overdose of colchicine can be fatal.  Overdose symptoms may include diarrhea, nausea, vomiting, stomach pain, muscle weakness, little or no urinating, numbness or tingling, weak pulse, slow heart rate, weak or shallow breathing, or fainting. What should I avoid while taking colchicine? Grapefruit and grapefruit juice may interact with colchicine and lead to unwanted side effects. Avoid the use of grapefruit products while taking colchicine. What are the possible side effects of colchicine?   Get emergency medical help if you have signs of an allergic reaction: hives; difficult breathing; swelling of your face, lips, tongue, or throat. Call your doctor at once if you have:  · muscle pain or weakness;  · numbness or tingly feeling in your fingers or toes;  · pale or gray appearance of your lips, tongue, or hands;  · severe or ongoing vomiting or diarrhea;  · fever, chills, body aches, flu symptoms; or  · easy bruising, unusual bleeding, feeling weak or tired. Common side effects may include:  · nausea, vomiting, stomach pain; or  · diarrhea. This is not a complete list of side effects and others may occur. Call your doctor for medical advice about side effects. You may report side effects to FDA at 4-954-DNC-4251. What other drugs will affect colchicine? Many drugs can interact with colchicine, and some drugs should not be used together. This includes prescription and over-the-counter medicines, vitamins, and herbal products. Not all possible interactions are listed in this medication guide. Tell your doctor about all medicines you use, and those you start or stop using during your treatment with colchicine. Give a list of all your medicines to any healthcare provider who treats you. Where can I get more information? Your pharmacist can provide more information about colchicine. Remember, keep this and all other medicines out of the reach of children, never share your medicines with others, and use this medication only for the indication prescribed. Every effort has been made to ensure that the information provided by [address] is accurate, up-to-date, and complete, but no guarantee is made to that effect. Drug information contained herein may be time sensitive. [facility] information has been compiled for use by healthcare practitioners and consumers in the [place] and therefore [employer] does not warrant that uses outside of the [place] are appropriate, unless specifically indicated otherwise.  [employer]

## 2021-12-16 ENCOUNTER — TELEPHONE (OUTPATIENT)
Dept: INTERNAL MEDICINE CLINIC | Age: 61
End: 2021-12-16

## 2021-12-16 DIAGNOSIS — M79.89 TOE SWELLING: Primary | ICD-10-CM

## 2021-12-16 NOTE — TELEPHONE ENCOUNTER
Patient called in regarding blood results 12/15/2021    Patient calling to following up on status    Phone: [phone]

## 2021-12-20 ENCOUNTER — OFFICE VISIT (OUTPATIENT)
Dept: INTERNAL MEDICINE CLINIC | Age: 61
End: 2021-12-20
Payer: MEDICARE

## 2021-12-20 VITALS
DIASTOLIC BLOOD PRESSURE: 72 MMHG | TEMPERATURE: 96.8 F | SYSTOLIC BLOOD PRESSURE: 124 MMHG | WEIGHT: 242 LBS | HEART RATE: 52 BPM | BODY MASS INDEX: 32.82 KG/M2 | OXYGEN SATURATION: 98 %

## 2021-12-20 DIAGNOSIS — M79.89 TOE SWELLING: Primary | ICD-10-CM

## 2021-12-20 PROCEDURE — 1036F TOBACCO NON-USER: CPT | Performed by: INTERNAL MEDICINE

## 2021-12-20 PROCEDURE — 3017F COLORECTAL CA SCREEN DOC REV: CPT | Performed by: INTERNAL MEDICINE

## 2021-12-20 PROCEDURE — 99213 OFFICE O/P EST LOW 20 MIN: CPT | Performed by: INTERNAL MEDICINE

## 2021-12-20 PROCEDURE — G8484 FLU IMMUNIZE NO ADMIN: HCPCS | Performed by: INTERNAL MEDICINE

## 2021-12-20 PROCEDURE — G8417 CALC BMI ABV UP PARAM F/U: HCPCS | Performed by: INTERNAL MEDICINE

## 2021-12-20 PROCEDURE — G8427 DOCREV CUR MEDS BY ELIG CLIN: HCPCS | Performed by: INTERNAL MEDICINE

## 2021-12-20 ASSESSMENT — PATIENT HEALTH QUESTIONNAIRE - PHQ9
SUM OF ALL RESPONSES TO PHQ QUESTIONS 1-9: 0
SUM OF ALL RESPONSES TO PHQ QUESTIONS 1-9: 0
1. LITTLE INTEREST OR PLEASURE IN DOING THINGS: 0
SUM OF ALL RESPONSES TO PHQ QUESTIONS 1-9: 0
SUM OF ALL RESPONSES TO PHQ9 QUESTIONS 1 & 2: 0
2. FEELING DOWN, DEPRESSED OR HOPELESS: 0

## 2021-12-20 NOTE — PROGRESS NOTES
Chief Complaint   Patient presents with    Hypertension       Assessment/Plan:  Christina Archibald was seen today for hypertension. Diagnoses and all orders for this visit:    Toe swelling  Comments: It could still be gout. I think he needs x-rays but he already has a podiatrist.  He was referred there for follow up on his foot. Orders:  -     External Referral To Podiatry      Vitals:    12/20/21 1034   BP: 124/72   Pulse: 52   Temp: 96.8 °F (36 °C)   SpO2: 98%       Physical Exam  Vitals reviewed. Constitutional:       General: He is not in acute distress. Appearance: He is well-developed. He is not diaphoretic. HENT:      Head: Normocephalic and atraumatic. Pulmonary:      Effort: Pulmonary effort is normal.   Neurological:      Mental Status: He is alert and oriented to person, place, and time. Cranial Nerves: No cranial nerve deficit. Psychiatric:         Behavior: Behavior normal.         Thought Content: Thought content normal.         Judgment: Judgment normal.       Media Information             Document Information    Clinical Consent:  Wound      12/20/2021 10:53   Attached To:    Office Visit on 12/20/21 with José Dumont MD     Source Information    José Dumont MD  [facility]         [facility] Scores 12/20/2021 12/15/2021 6/18/2021 12/18/2020 6/17/2020 10/24/2018 9/26/2018   PHQ2 Score 0 0 0 0 0 2 3   PHQ9 Score 0 0 0 0 0 10 13     Interpretation of Total Score Depression Severity: 1-4 = Minimal depression, 5-9 = Mild depression, 10-14 = Moderate depression, 15-19 = Moderately severe depression, 20-27 = Severe depression    Hero Andre MD  [address]

## 2021-12-24 ENCOUNTER — HOSPITAL ENCOUNTER (OUTPATIENT)
Age: 61
Discharge: HOME | End: 2021-12-24
Payer: MEDICARE

## 2021-12-24 PROCEDURE — U0003 INFECTIOUS AGENT DETECTION BY NUCLEIC ACID (DNA OR RNA); SEVERE ACUTE RESPIRATORY SYNDROME CORONAVIRUS 2 (SARS-COV-2) (CORONAVIRUS DISEASE [COVID-19]), AMPLIFIED PROBE TECHNIQUE, MAKING USE OF HIGH THROUGHPUT TECHNOLOGIES AS DESCRIBED BY CMS-2020-01-R: HCPCS

## 2021-12-24 PROCEDURE — U0005 INFEC AGEN DETEC AMPLI PROBE: HCPCS

## 2021-12-25 LAB — SARS-COV-2: NOT DETECTED

## 2021-12-26 RX ORDER — METOPROLOL TARTRATE 100 MG/1
TABLET ORAL
Qty: 180 TABLET | Refills: 1 | Status: SHIPPED | OUTPATIENT
Start: 2021-12-26 | End: 2022-06-09

## 2021-12-26 RX ORDER — METOPROLOL TARTRATE 100 MG/1
TABLET ORAL
Qty: 60 TABLET | Refills: 1 | Status: SHIPPED | OUTPATIENT
Start: 2021-12-26 | End: 2021-12-26

## 2021-12-29 ENCOUNTER — HOSPITAL ENCOUNTER (OUTPATIENT)
Dept: SLEEP CENTER | Age: 61
Discharge: HOME | End: 2021-12-29
Payer: MEDICARE

## 2021-12-29 DIAGNOSIS — G47.52 RBD (REM BEHAVIORAL DISORDER): ICD-10-CM

## 2021-12-29 DIAGNOSIS — G20 PARKINSON'S DISEASE (HCC): Chronic | ICD-10-CM

## 2021-12-29 DIAGNOSIS — G47.33 OBSTRUCTIVE SLEEP APNEA (ADULT) (PEDIATRIC): ICD-10-CM

## 2021-12-29 PROCEDURE — 95810 POLYSOM 6/> YRS 4/> PARAM: CPT

## 2022-01-02 PROCEDURE — 95810 POLYSOM 6/> YRS 4/> PARAM: CPT | Performed by: INTERNAL MEDICINE

## 2022-01-04 ENCOUNTER — TELEPHONE (OUTPATIENT)
Dept: PULMONOLOGY | Age: 62
End: 2022-01-04

## 2022-01-04 NOTE — TELEPHONE ENCOUNTER
Pt calling for results of PSG. Pt does not want to schedule a titration at this time. Pt states that he tried a pap unit \" years ago\" and could not tolerate it. Explained to pt that the units and the masks are different compared to [employer] and he may be more comfortable with use. Copy of study mailed to pt and pt to call back when he is ready to move forward.

## 2022-01-05 NOTE — TELEPHONE ENCOUNTER
Robb [place],    Just a follow up on [facility]. .. this was the message from my RT when she called him to review his study. \"Pt calling for results of PSG. Pt does not want to schedule a titration at this time. Pt states that he tried a pap unit \" years ago\" and could not tolerate it. Explained to pt that the units and the masks are different compared to [employer] and he may be more comfortable with use. Copy of study mailed to pt and pt to call back when he is ready to move forward. \"    Hopefully you may be able to talk with him and convince him to get the proper treatment for his obstructive sleep apnea. Thanks.     Sanam Ash MD

## 2022-01-07 ENCOUNTER — TELEPHONE (OUTPATIENT)
Dept: PULMONOLOGY | Age: 62
End: 2022-01-07

## 2022-01-07 NOTE — TELEPHONE ENCOUNTER
Patient called and has decided he should maybe schedule titration study. Transferred patient to Sleep Lab scheduling.

## 2022-01-11 ENCOUNTER — TELEPHONE (OUTPATIENT)
Dept: PULMONOLOGY | Age: 62
End: 2022-01-11

## 2022-01-11 DIAGNOSIS — Z20.822 ENCOUNTER FOR PREOPERATIVE SCREENING LABORATORY TESTING FOR COVID-19 VIRUS: Primary | ICD-10-CM

## 2022-01-11 DIAGNOSIS — Z01.812 ENCOUNTER FOR PREOPERATIVE SCREENING LABORATORY TESTING FOR COVID-19 VIRUS: Primary | ICD-10-CM

## 2022-01-17 ENCOUNTER — HOSPITAL ENCOUNTER (OUTPATIENT)
Age: 62
Discharge: HOME | End: 2022-01-17
Payer: MEDICARE

## 2022-01-17 LAB — SARS-COV-2: NOT DETECTED

## 2022-01-17 PROCEDURE — U0005 INFEC AGEN DETEC AMPLI PROBE: HCPCS

## 2022-01-17 PROCEDURE — U0003 INFECTIOUS AGENT DETECTION BY NUCLEIC ACID (DNA OR RNA); SEVERE ACUTE RESPIRATORY SYNDROME CORONAVIRUS 2 (SARS-COV-2) (CORONAVIRUS DISEASE [COVID-19]), AMPLIFIED PROBE TECHNIQUE, MAKING USE OF HIGH THROUGHPUT TECHNOLOGIES AS DESCRIBED BY CMS-2020-01-R: HCPCS

## 2022-01-19 DIAGNOSIS — G47.33 OBSTRUCTIVE SLEEP APNEA (ADULT) (PEDIATRIC): Primary | ICD-10-CM

## 2022-01-20 ENCOUNTER — HOSPITAL ENCOUNTER (OUTPATIENT)
Dept: SLEEP CENTER | Age: 62
Discharge: HOME | End: 2022-01-20
Payer: MEDICARE

## 2022-01-20 DIAGNOSIS — G47.33 OBSTRUCTIVE SLEEP APNEA (ADULT) (PEDIATRIC): ICD-10-CM

## 2022-01-20 PROCEDURE — 95811 POLYSOM 6/>YRS CPAP 4/> PARM: CPT

## 2022-01-25 PROCEDURE — 95811 POLYSOM 6/>YRS CPAP 4/> PARM: CPT | Performed by: INTERNAL MEDICINE

## 2022-01-27 ENCOUNTER — TELEPHONE (OUTPATIENT)
Dept: PULMONOLOGY | Age: 62
End: 2022-01-27

## 2022-01-27 NOTE — TELEPHONE ENCOUNTER
Pt has concerns about trying cpap. Pt stated he tried about 10 years ago and could not tolerate. Pt would prefer to speak with provider before anything is ordered.

## 2022-01-31 ENCOUNTER — OFFICE VISIT (OUTPATIENT)
Dept: INTERNAL MEDICINE CLINIC | Age: 62
End: 2022-01-31
Payer: MEDICARE

## 2022-01-31 DIAGNOSIS — J01.90 ACUTE NON-RECURRENT SINUSITIS, UNSPECIFIED LOCATION: Primary | ICD-10-CM

## 2022-01-31 PROCEDURE — 99442 PR PHYS/QHP TELEPHONE EVALUATION 11-20 MIN: CPT | Performed by: INTERNAL MEDICINE

## 2022-01-31 RX ORDER — FLUTICASONE PROPIONATE 50 MCG
SPRAY, SUSPENSION (ML) NASAL
Qty: 1 EACH | Refills: 5 | Status: SHIPPED | OUTPATIENT
Start: 2022-01-31

## 2022-01-31 RX ORDER — AMOXICILLIN AND CLAVULANATE POTASSIUM 875; 125 MG/1; MG/1
1 TABLET, FILM COATED ORAL 2 TIMES DAILY
Qty: 14 TABLET | Refills: 0 | Status: SHIPPED | OUTPATIENT
Start: 2022-01-31 | End: 2022-02-07

## 2022-01-31 NOTE — PROGRESS NOTES
Chago Park is a 57 y.o. male evaluated via telephone on 1/31/2022. Consent:  He and/or health care decision maker is aware that that he may receive a bill for this telephone service, which includes applicable co-pays, depending on his insurance coverage, and has provided verbal consent to proceed. Documentation:  I communicated with the patient and/or health care decision maker about: having post nasal drip, sore throat. No fever. He has sinus pressure at the roof of his throat and bridge of his nose. Taking generic Allegra. No Flonase. Symptoms two weeks     Details of this discussion including any medical advice provided:     1. Acute non-recurrent sinusitis, unspecified location  -     amoxicillin-clavulanate (AUGMENTIN) 875-125 MG per tablet; Take 1 tablet by mouth 2 times daily for 7 days, Disp-14 tablet, R-0Normal  -     fluticasone (FLONASE) 50 MCG/ACT nasal spray; SHAKE LIQUID AND USE 1 SPRAY IN EACH NOSTRIL DAILY, Disp-1 each, R-5Normal      I affirm this is a Patient Initiated Episode with a Patient who has not had a related appointment within my department in the past 7 days or scheduled within the next 24 hours. Patient identification was verified at the start of the visit: Yes    Total Time: minutes: 11-20 minutes    Chago Park was evaluated through a synchronous (real-time) audio encounter. The patient was located at home in a state where the provider was licensed to provide care.     Note: not billable if this call serves to triage the patient into an appointment for the relevant concern      Vickie Baeza MD

## 2022-02-15 ENCOUNTER — OFFICE VISIT (OUTPATIENT)
Dept: PULMONOLOGY | Age: 62
End: 2022-02-15
Payer: MEDICARE

## 2022-02-15 VITALS
SYSTOLIC BLOOD PRESSURE: 122 MMHG | DIASTOLIC BLOOD PRESSURE: 72 MMHG | WEIGHT: 245 LBS | OXYGEN SATURATION: 97 % | TEMPERATURE: 97.3 F | HEART RATE: 78 BPM | HEIGHT: 72 IN | BODY MASS INDEX: 33.18 KG/M2

## 2022-02-15 DIAGNOSIS — G47.33 OSA (OBSTRUCTIVE SLEEP APNEA): Chronic | ICD-10-CM

## 2022-02-15 DIAGNOSIS — E66.9 OBESITY (BMI 30.0-34.9): Chronic | ICD-10-CM

## 2022-02-15 DIAGNOSIS — I10 ESSENTIAL HYPERTENSION: Chronic | ICD-10-CM

## 2022-02-15 DIAGNOSIS — G20 PARKINSON'S DISEASE (HCC): Chronic | ICD-10-CM

## 2022-02-15 DIAGNOSIS — I48.0 PAF (PAROXYSMAL ATRIAL FIBRILLATION) (HCC): Chronic | ICD-10-CM

## 2022-02-15 PROBLEM — E66.811 OBESITY (BMI 30.0-34.9): Chronic | Status: ACTIVE | Noted: 2020-02-05

## 2022-02-15 PROCEDURE — G8417 CALC BMI ABV UP PARAM F/U: HCPCS | Performed by: INTERNAL MEDICINE

## 2022-02-15 PROCEDURE — G8427 DOCREV CUR MEDS BY ELIG CLIN: HCPCS | Performed by: INTERNAL MEDICINE

## 2022-02-15 PROCEDURE — 1036F TOBACCO NON-USER: CPT | Performed by: INTERNAL MEDICINE

## 2022-02-15 PROCEDURE — 3017F COLORECTAL CA SCREEN DOC REV: CPT | Performed by: INTERNAL MEDICINE

## 2022-02-15 PROCEDURE — 99214 OFFICE O/P EST MOD 30 MIN: CPT | Performed by: INTERNAL MEDICINE

## 2022-02-15 PROCEDURE — G8484 FLU IMMUNIZE NO ADMIN: HCPCS | Performed by: INTERNAL MEDICINE

## 2022-02-15 ASSESSMENT — SLEEP AND FATIGUE QUESTIONNAIRES
HOW LIKELY ARE YOU TO NOD OFF OR FALL ASLEEP WHILE SITTING INACTIVE IN A PUBLIC PLACE: 2
HOW LIKELY ARE YOU TO NOD OFF OR FALL ASLEEP WHILE SITTING QUIETLY AFTER LUNCH WITHOUT ALCOHOL: 2
HOW LIKELY ARE YOU TO NOD OFF OR FALL ASLEEP IN A CAR, WHILE STOPPED FOR A FEW MINUTES IN TRAFFIC: 0
HOW LIKELY ARE YOU TO NOD OFF OR FALL ASLEEP WHILE SITTING AND TALKING TO SOMEONE: 0
HOW LIKELY ARE YOU TO NOD OFF OR FALL ASLEEP WHILE WATCHING TV: 2
ESS TOTAL SCORE: 12
HOW LIKELY ARE YOU TO NOD OFF OR FALL ASLEEP WHILE LYING DOWN TO REST IN THE AFTERNOON WHEN CIRCUMSTANCES PERMIT: 3
HOW LIKELY ARE YOU TO NOD OFF OR FALL ASLEEP WHEN YOU ARE A PASSENGER IN A CAR FOR AN HOUR WITHOUT A BREAK: 2
HOW LIKELY ARE YOU TO NOD OFF OR FALL ASLEEP WHILE SITTING AND READING: 1

## 2022-02-15 NOTE — LETTER
[facility]  [id number] [address] [facility]  [place] [address]  Phone: [phone]  Fax: [phone]    Da Rodríguez MD    February 15, 2022     Ben Tee, [id number] [facility]    Patient: Katlin David   MR Number: [id number]   YOB: 1960   Date of Visit: 2/15/2022       Dear Ben Tee: Thank you for referring Chapo Archibald to me for evaluation/treatment. Below are the relevant portions of my assessment and plan of care. 1. KATELIN (obstructive sleep apnea)  Assessment & Plan:  New Problem - needs treatment:  Reviewed sleep study. Limit caffeine use after 3pm.    2. PAF (paroxysmal atrial fibrillation) ([facility])  Assessment & Plan:  Chronic- Stable. Discussed the importance of treating sleep apnea as part of the management of this disorder. Cont any meds per PCP and other physicians. 3. Essential hypertension  Assessment & Plan:  Chronic- Stable. Discussed the importance of treating sleep apnea as part of the management of this disorder. Cont any meds per PCP and other physicians. 4. Parkinson's disease ([address]  Assessment & Plan:  Chronic- Stable. Discussed the importance of treating sleep apnea as part of the management of this disorder. Cont any meds per PCP and other physicians. 5. Obesity (BMI 30.0-34. 9)  Assessment & Plan:  Chronic-not stable:  Discussed importance of treating obstructive sleep apnea and getting sufficient sleep to assist with weight control. Encouraged him to work on weight loss through diet and exercise. Recommended DASH or Mediterranean diets. Reviewed, analyzed, and documented physiologic data from patient's PAP machine. This information was analyzed to assess complexity and medical decision making in regards to further testing and management.     Diagnoses of KATELIN (obstructive sleep apnea), PAF (paroxysmal atrial fibrillation) ([address] Essential hypertension, Parkinson's disease ([address] and Obesity (BMI 30.0-34.9) were pertinent to this visit. The chronic medical conditions listed are directly related to the primary diagnosis listed above. The management of the primary diagnosis affects the secondary diagnosis and vice versa. If you have questions, please do not hesitate to call me. I look forward to following Evangelista Manjarrez along with you.     Sincerely,      Mk Aguilera MD

## 2022-02-15 NOTE — PROGRESS NOTES
Nitin Tidwell MD, [facility], [facility]  Merly Casillas CNP [address]  [address] [facility], [facility] Joe Crowley E [phone]   [facility] [address]  [place]. [address] [place] [address]. Ifeanyi Adams 37 [phone]     [address] [address] [phone]      Assessment/Plan:      1. KATELIN (obstructive sleep apnea)  Assessment & Plan:  New Problem - needs treatment:  Reviewed sleep study. Limit caffeine use after 3pm.   Discussed several options on how to treat his sleep apnea including surgery versus oral appliance versus pressure therapy. Given his medical history and the severity of hypoxia the recommended treatment would be pressure therapy. The patient is reluctantly willing to give it a shot. We will send a prescription for a machine based on the results of his sleep testing and see him back in a follow-up to measure compliance and efficacy. 2. PAF (paroxysmal atrial fibrillation) ([facility])  Assessment & Plan:  Chronic- Stable. Discussed the importance of treating sleep apnea as part of the management of this disorder. Cont any meds per PCP and other physicians. 3. Essential hypertension  Assessment & Plan:  Chronic- Stable. Discussed the importance of treating sleep apnea as part of the management of this disorder. Cont any meds per PCP and other physicians. 4. Parkinson's disease ([address]  Assessment & Plan:  Chronic- Stable. Discussed the importance of treating sleep apnea as part of the management of this disorder. Cont any meds per PCP and other physicians. 5. Obesity (BMI 30.0-34. 9)  Assessment & Plan:  Chronic-not stable:  Discussed importance of treating obstructive sleep apnea and getting sufficient sleep to assist with weight control. Encouraged him to work on weight loss through diet and exercise. Recommended DASH or Mediterranean diets.       Reviewed, analyzed, and documented physiologic data from patient's PAP machine. This information was analyzed to assess complexity and medical decision making in regards to further testing and management. Diagnoses of KATELIN (obstructive sleep apnea), PAF (paroxysmal atrial fibrillation) ([facility] Essential hypertension, Parkinson's disease ([facility] and Obesity (BMI 30.0-34.9) were pertinent to this visit. The chronic medical conditions listed are directly related to the primary diagnosis listed above. The management of the primary diagnosis affects the secondary diagnosis and vice versa. Subjective:   Subjective   Patient ID: Beto Nicole is a 57 y.o. male. Chief Complaint   Patient presents with    Sleep Apnea       HPI:    PSG done on 2021 showed a CMS AHI-5/HR but an RDI-42.9/HR with a low sat of 83% of the time below 90 of 218 minutes. CPAP titration done on 2022. Did not have his machine ordered wanted to discuss results of his testing first.  He has concerns that he tried a machine over 10 years ago was not able to tolerate it. He feels he tosses and turns a lot at night which may cause issues with him using his machine. We reviewed his sleep study in depth and discussed different treatment options and why pressure therapy would be the recommended initial treatment.     [place] - Total score: 12    Social History     Socioeconomic History    Marital status:      Spouse name: Not on file    Number of children: 1    Years of education: Not on file    Highest education level: Not on file   Occupational History    Occupation: Retired in      Comment: heavy equipment / [employer]    Tobacco Use    Smoking status: Former Smoker     Packs/day: 1.00     Years: 15.00     Pack years: 15.00     Quit date: 1996     Years since quittin.7    Smokeless tobacco: Never Used   Vaping Use    Vaping Use: Never used   Substance and Sexual Activity    Alcohol use: Not Currently     Alcohol/week: 20.0 standard drinks     Types: 20 Standard drinks or equivalent per week     Comment: weekends ; quit 09/2020    Drug use: Not Currently     Types: Marijuana Jen Ruelas)     Comment: occ    Sexual activity: Not on file   Other Topics Concern    Not on file   Social History Narrative    Grew up in [facility]    No hx of abuse     in 2001 after 11 yrs of marriage    Has 1 daughter    Has 2 brothers, he is the middle child     Social Determinants of Health     Financial Resource Strain: Low Risk     Difficulty of Paying Living Expenses: Not hard at all   Food Insecurity: No Food Insecurity    Worried About [facility] in the Last Year: Never true    [facility] in the Last Year: Never true   Transportation Needs:     Lack of Transportation (Medical): Not on file    Lack of Transportation (Non-Medical):  Not on file   Physical Activity:     Days of Exercise per Week: Not on file    Minutes of Exercise per Session: Not on file   Stress:     Feeling of Stress : Not on file   Social Connections:     Frequency of Communication with Friends and Family: Not on file    Frequency of Social Gatherings with Friends and Family: Not on file    Attends Amish Services: Not on file    Active Member of [address] [place] [address] or Organizations: Not on file    Attends Club or Organization Meetings: Not on file    Marital Status: Not on file   Intimate Partner Violence:     Fear of Current or Ex-Partner: Not on file    Emotionally Abused: Not on file    Physically Abused: Not on file    Sexually Abused: Not on file   Housing Stability:     Unable to Pay for Housing in the Last Year: Not on file    Number of [place] in the Last Year: Not on file    Unstable Housing in the Last Year: Not on file       Current Outpatient Medications   Medication Instructions    carbidopa-levodopa (SINEMET)  MG per tablet 3 tablets, Oral, 4 TIMES DAILY    clonazePAM (KLONOPIN) 0.5 MG tablet NIGHTLY PRN    colchicine

## 2022-02-15 NOTE — ASSESSMENT & PLAN NOTE
New Problem - needs treatment:  Reviewed sleep study. Limit caffeine use after 3pm.   Discussed several options on how to treat his sleep apnea including surgery versus oral appliance versus pressure therapy. Given his medical history and the severity of hypoxia the recommended treatment would be pressure therapy. The patient is reluctantly willing to give it a shot. We will send a prescription for a machine based on the results of his sleep testing and see him back in a follow-up to measure compliance and efficacy.

## 2022-03-14 NOTE — PROGRESS NOTES
[facility]   Electrophysiology Follow up   Date: 3/15/2022  I had the privilege of visiting Ricardo Kline in the office. CC: PAF  HPI: Ricardo Kline is a 57 y.o. male with a PMH of atrial fibrillation, KATELIN, HTN, and parkinson's disease. He also has history of prior atrial fibrillation/flutter ablation in late 90's at [facility] by Dr. Abhijit Naranjo.      2/25/16, he had PVI and CAFE ablation with restoration of sinus rhythm with ablation. He returned with recurrent atrial fibrillation after being off Propafenone and was cardioverted on 4/6/16.      He presented to [facility] ED on 9/2020 with complaints of sudden palpitations while watching TV.     Admitted on 9/2020 with atrial fibrillation and RVR. He stated that his afib was worsening in the past few months prior to ablation. He had cardioversion on 8/2020 prior to admission. S/p RFCA with PVI, focal left sided atrial tachycardia, and roof line (1/20/21). Holter Monitor 04/09/2021 to 04/23/2021 Sinus rhythm, PAC burden 0.01%, no atrial fibrillation. 1st degree bloc. Max , Min HR 42, Average HR 61     Karri presents to the office in follow up. He is feeling well from a cardiac standpoint and denies any recurrence atrial fibrillation since his ablation. He has not been very active as he has some foot pain but plans to increase activity with bike riding once the weather is better. He is in normal rhythm today. Patient denies lightheadedness, dizziness, chest pain, palpitations, orthopnea, edema, presyncope or syncope.        Assessment and plan:     -PAF   ECG today shows Sinus Rhythm 1st degree AV block    Denies recurrence of atrial fibrillation since his second ablation    Discussed symptoms of Atrial fibrillation and he knows the symptoms and is aware when he has episodes   Encouraged monitoring of his heart rate   Continue lopressor 100 mg BID, Diltiazem 180 mg daily     Holter Monitor 04/09/2021 to 04/23/2021 Sinus rhythm, PAC burden 0.01%, no atrial fibrillation. 1st degree bloc. Max , Min HR 42, Average HR 63    S/p RFCA with PVI, focal left sided atrial tachycardia, and roof line (1/20/21). s/p DCCV 4/6/2016   S/p RFCA with PVI and CFAE ablation (2/25/16)  [id number] score: 1 (HTN) ; QYY1ZC2 Vasc score and anticoagulation discussed. No longer on AC as his score is low      -HTN  Not well controlled: 140/82  Will improve with diet and exercise, will follow up with PCP if this remains elevated   BP goal <130/80  Home BP monitoring encouraged, printed information provided on how to accurately measure BP at home. Counseled to follow a low salt diet to assure blood pressure remains controlled for cardiovascular risk factor modification. The patient is counseled to get regular exercise 3-5 times per week and maintain a healthy weight reduce cardiovascular risk factors. -Obesity  Body mass index is 33.36 kg/m². Excessive weight is complicating assessment and treatment. It is placing patient at risk for multiple co-morbidities as well as early death and contributing to the patient's presentation.    discussed weight management with diet and exercise    Discussed increased activity once weather is better and he can ride his bike        -KATELIN   Positive for sleep apnea   Awaiting machine  Encourage to use machine to prevent long term effects of untreated KATELIN        Patient Active Problem List    Diagnosis Date Noted    Atrial tachycardia ([facility]     Parkinson's disease ([facility] 08/19/2020    Syncope and collapse 08/19/2020    Obesity (BMI 30.0-34.9) 02/05/2020    Moderate episode of recurrent major depressive disorder ([address] 09/11/2018    Adjustment disorder with anxious mood 09/05/2018    Disabling essential tremor 12/01/2017    PAF (paroxysmal atrial fibrillation) ([address] 02/25/2016    KATELIN (obstructive sleep apnea) 12/19/2014    Essential hypertension 11/16/2011     Diagnostic studies:   ECG 3/15/22  SR first degree AV block   419 QTcH, 92 QRS      MASSIMO 1/20/2021   Summary   Normal left ventricular cavity size and wall thickness. Global left ventricular function is normal with ejection fraction estimated   from 50 % to 55 %. No regional wall motion abnormalities noted. There is no evidence of mass or thrombus in the left atrium or appendage. Limited echo 9/14/2020   Summary   -Limited exam per Dr. Karlene Jimenez. Patient had a MASSIMO 5/2020.   -Normal left ventricle size, wall thickness, and systolic function with an   estimated ejection fraction of 55%.   -No regional wall motion abnormalities are seen.   -Trivial mitral regurgitation    I independently reviewed the cardiac diagnostic studies, ECG and relevant imaging studies. Lab Results   Component Value Date    LVEF 53 01/20/2021     Lab Results   Component Value Date    TSH 2.03 06/17/2020         Physical Examination:  Vitals:    03/15/22 1352   BP: (!) 140/82   Pulse: 55   SpO2: 98%      Wt Readings from Last 3 Encounters:   03/15/22 246 lb (111.6 kg)   02/15/22 245 lb (111.1 kg)   12/20/21 242 lb (109.8 kg)       · Constitutional: Oriented. No distress. · Head: Normocephalic and atraumatic. · Mouth/Throat: Oropharynx is clear and moist.   · Eyes: Conjunctivae normal. EOM are normal.   · Neck: Neck supple. No JVD present. · Cardiovascular: Normal rate, regular rhythm, S1&S2. · Pulmonary/Chest: Bilateral respiratory sounds. No rhonchi. · Abdominal: Soft. No tenderness. · Musculoskeletal: No tenderness. No edema    · Lymphadenopathy: Has no cervical adenopathy. · Neurological: Alert and oriented. Follows command, No Gross deficit   · Skin: Skin is warm, No rash noted. · Psychiatric: Has a normal behavior       Review of System:  [x] Full ROS obtained and negative except as mentioned in HPI    Prior to Admission medications    Medication Sig Start Date End Date Taking?  Authorizing Provider   fluticasone (FLONASE) 50 MCG/ACT nasal spray SHAKE LIQUID AND USE 1 SPRAY IN EACH NOSTRIL DAILY 1/31/22  Yes Hollis Isabel MD   metoprolol (LOPRESSOR) 100 MG tablet TAKE 1 TABLET BY MOUTH TWICE DAILY 12/26/21  Yes Missael Monterroso MD   dilTIAZem [facility] CD) 180 MG extended release capsule TAKE 1 CAPSULE BY MOUTH DAILY 9/30/21  Yes EMILIA Grigsby CNP   clonazePAM (KLONOPIN) 0.5 MG tablet nightly as needed. 3/15/21  Yes Historical Provider, MD   fexofenadine (ALLEGRA) 180 MG tablet Take 180 mg by mouth daily   Yes Historical Provider, MD   carbidopa-levodopa (SINEMET)  MG per tablet Take 3 tablets by mouth 4 times daily  Patient taking differently: Take 3 tablets by mouth Taking 3 tablets by mouth 5 times daily 2/6/20  Yes EMILIA Grigsby CNP       No Known Allergies    Social History:  Reviewed. reports that he quit smoking about 25 years ago. He has a 15.00 pack-year smoking history. He has never used smokeless tobacco. He reports previous alcohol use of about 20.0 standard drinks of alcohol per week. He reports previous drug use. Drug: Marijuana Ely Dueñas). Family History:  Reviewed. Reviewed. No family history of SCD. Relevant and available labs, and cardiovascular diagnostics reviewed. Reviewed. I independently reviewed relevant and available cardiac diagnostic tests ECG, CXR, Echo, Stress test, Device interrogation, Holter, CT scan. - The patient is counseled to follow a low salt diet to assure blood pressure remains controlled for cardiovascular risk factor modification.   - The patient is counseled to avoid excess caffeine, and energy drinks as this may exacerbated ectopy and arrhythmia. - The patient is counseled to get regular exercise 3-5 times per week to control cardiovascular risk factors. - The patient is counseled to lose weigt to control cardiovascular risk factors. - The patient is counseled to avoid tobacco use. All questions and concerns were addressed to the patient/family. Alternatives to my treatment were discussed. I have discussed the above stated plan and the patient verbalized understanding and agreed with the plan. Scribe attestation: This note was scribed in the presence of Phil Han MD by Scot Bush RN  Physician Attestation: I, Dr. Phil Han, confirm that the scribe's documentation has been prepared under my direction and personally reviewed by me in its entirety. I also confirm that the note above accurately reflects all work, treatment, procedures, and medical decision making performed by me. NOTE: This report was transcribed using voice recognition software. Every effort was made to ensure accuracy, however, inadvertent computerized transcription errors may be present.      Phil Han MD, MPH  [place] [address]   Office: [phone]  Fax: [phone]

## 2022-03-15 ENCOUNTER — OFFICE VISIT (OUTPATIENT)
Dept: CARDIOLOGY CLINIC | Age: 62
End: 2022-03-15
Payer: MEDICARE

## 2022-03-15 VITALS
HEIGHT: 72 IN | WEIGHT: 246 LBS | OXYGEN SATURATION: 98 % | SYSTOLIC BLOOD PRESSURE: 140 MMHG | DIASTOLIC BLOOD PRESSURE: 82 MMHG | HEART RATE: 55 BPM | BODY MASS INDEX: 33.32 KG/M2

## 2022-03-15 DIAGNOSIS — G47.33 OSA (OBSTRUCTIVE SLEEP APNEA): Chronic | ICD-10-CM

## 2022-03-15 DIAGNOSIS — I10 ESSENTIAL HYPERTENSION: Chronic | ICD-10-CM

## 2022-03-15 DIAGNOSIS — E66.9 OBESITY (BMI 30.0-34.9): Chronic | ICD-10-CM

## 2022-03-15 DIAGNOSIS — I48.0 PAF (PAROXYSMAL ATRIAL FIBRILLATION) (HCC): Primary | Chronic | ICD-10-CM

## 2022-03-15 PROCEDURE — G8484 FLU IMMUNIZE NO ADMIN: HCPCS | Performed by: INTERNAL MEDICINE

## 2022-03-15 PROCEDURE — 3017F COLORECTAL CA SCREEN DOC REV: CPT | Performed by: INTERNAL MEDICINE

## 2022-03-15 PROCEDURE — 1036F TOBACCO NON-USER: CPT | Performed by: INTERNAL MEDICINE

## 2022-03-15 PROCEDURE — 99214 OFFICE O/P EST MOD 30 MIN: CPT | Performed by: INTERNAL MEDICINE

## 2022-03-15 PROCEDURE — 93000 ELECTROCARDIOGRAM COMPLETE: CPT | Performed by: INTERNAL MEDICINE

## 2022-03-15 PROCEDURE — G8427 DOCREV CUR MEDS BY ELIG CLIN: HCPCS | Performed by: INTERNAL MEDICINE

## 2022-03-15 PROCEDURE — G8417 CALC BMI ABV UP PARAM F/U: HCPCS | Performed by: INTERNAL MEDICINE

## 2022-05-11 ENCOUNTER — TELEPHONE (OUTPATIENT)
Dept: PULMONOLOGY | Age: 62
End: 2022-05-11

## 2022-05-11 NOTE — TELEPHONE ENCOUNTER
Spoke with pt. Pt states he was not called for CPAP unit and said \" I was not going to polo down someone trying to sell me something\". Order to be sent to [facility]. F/U rescheduled.

## 2022-05-11 NOTE — PROGRESS NOTES
Karri Rivera         : 1960  [facility]    Diagnosis: [x] KATELIN (G47.33) [] CSA (G47.31) [] Apnea (G47.30)   Length of Need: [x] 18 Months [] 99 Months [] Other:    Machine (VELASQUEZ!): [] Respironics Dream Station   2   Auto [x] ResMed AirSense     Auto S11 [] Other:     [x]  CPAP () [] Bilevel ()   Mode: [x] Auto [] Spontaneous    Mode: [] Auto [] Spontaneous      P min 7 cmH2O  P max 17 cmH2O      Comfort Settings:   - Ramp Pressure: 4 cmH2O                                        - Ramp time: 15 min                                     -  Flex/EPR - 3 full time                                    - For ResMed Bilevel (TiMax-4 sec   TiMin- 0.2 sec)     Humidifier: [x] Heated ()        [x] Water chamber replacement ()/ 1 per 6 months        Mask:   [x] Nasal () /1 per 3 months [] Full Face () /1 per 3 months   [x] Patient choice -Size and fit mask [] Patient Choice - Size and fit mask   [] Dispense:  [] Dispense:    [x] Headgear () / 1 per 3 months [] Headgear () / 1 per 3 months   [x] Replacement Nasal Cushion ()/2 per month [] Interface Replacement ()/1 per month   [] Replacement Nasal Pillows ()/2 per month         Tubing: [x] Heated ()/1 per 3 months    [] Standard ()/1 per 3 months [] Other:           Filters: [x] Non-disposable ()/1 per 6 months     [x] Ultra-Fine, Disposable ()/2 per month        Miscellaneous: [] Chin Strap ()/ 1 per 6 months [] O2 bleed-in:       LPM   [] Oximetry on CPAP/Bilevel []  Other:    [x] Modem: ()         Start Order Date: 22    MEDICAL JUSTIFICATION:  I, the undersigned, certify that the above prescribed supplies are medically necessary for this patients wellbeing. In my opinion, the supplies are both reasonable and necessary in reference to accepted standards of medicalpractice in treatment of this patients condition.     Daniel Zheng MD      NPI: [id number]       Order Signed Date: 22    Electronically signed by Wagner Lama MD on 2022 at 10:49 AM    Cindy Hernandez  1960  [facility] [place] [address]  [phone] (home)   [phone] (mobile)      Insurance Info (confirm with patient if correct):  Payor/Plan Subscr  Sex Relation Sub.  Ins. ID Effective Group Num

## 2022-06-09 RX ORDER — METOPROLOL TARTRATE 100 MG/1
TABLET ORAL
Qty: 60 TABLET | Refills: 0 | Status: SHIPPED | OUTPATIENT
Start: 2022-06-09 | End: 2022-07-11

## 2022-06-09 RX ORDER — METOPROLOL TARTRATE 100 MG/1
TABLET ORAL
Qty: 180 TABLET | OUTPATIENT
Start: 2022-06-09

## 2022-07-11 RX ORDER — METOPROLOL TARTRATE 100 MG/1
TABLET ORAL
Qty: 60 TABLET | Refills: 0 | Status: SHIPPED | OUTPATIENT
Start: 2022-07-11 | End: 2022-08-16

## 2022-07-11 RX ORDER — METOPROLOL TARTRATE 100 MG/1
TABLET ORAL
Qty: 180 TABLET | OUTPATIENT
Start: 2022-07-11

## 2022-07-12 ENCOUNTER — OFFICE VISIT (OUTPATIENT)
Dept: PULMONOLOGY | Age: 62
End: 2022-07-12
Payer: MEDICARE

## 2022-07-12 VITALS
HEIGHT: 72 IN | HEART RATE: 69 BPM | OXYGEN SATURATION: 97 % | WEIGHT: 242.8 LBS | DIASTOLIC BLOOD PRESSURE: 60 MMHG | BODY MASS INDEX: 32.89 KG/M2 | SYSTOLIC BLOOD PRESSURE: 110 MMHG | TEMPERATURE: 98.4 F

## 2022-07-12 DIAGNOSIS — E66.9 OBESITY (BMI 30.0-34.9): Chronic | ICD-10-CM

## 2022-07-12 DIAGNOSIS — G47.33 OSA (OBSTRUCTIVE SLEEP APNEA): Chronic | ICD-10-CM

## 2022-07-12 DIAGNOSIS — I10 ESSENTIAL HYPERTENSION: Chronic | ICD-10-CM

## 2022-07-12 DIAGNOSIS — G20 PARKINSON'S DISEASE (HCC): Chronic | ICD-10-CM

## 2022-07-12 DIAGNOSIS — I48.0 PAF (PAROXYSMAL ATRIAL FIBRILLATION) (HCC): Chronic | ICD-10-CM

## 2022-07-12 PROCEDURE — 99214 OFFICE O/P EST MOD 30 MIN: CPT | Performed by: INTERNAL MEDICINE

## 2022-07-12 PROCEDURE — 3017F COLORECTAL CA SCREEN DOC REV: CPT | Performed by: INTERNAL MEDICINE

## 2022-07-12 PROCEDURE — G8427 DOCREV CUR MEDS BY ELIG CLIN: HCPCS | Performed by: INTERNAL MEDICINE

## 2022-07-12 PROCEDURE — 1036F TOBACCO NON-USER: CPT | Performed by: INTERNAL MEDICINE

## 2022-07-12 PROCEDURE — G8417 CALC BMI ABV UP PARAM F/U: HCPCS | Performed by: INTERNAL MEDICINE

## 2022-07-12 ASSESSMENT — SLEEP AND FATIGUE QUESTIONNAIRES
HOW LIKELY ARE YOU TO NOD OFF OR FALL ASLEEP WHILE LYING DOWN TO REST IN THE AFTERNOON WHEN CIRCUMSTANCES PERMIT: 2
ESS TOTAL SCORE: 16
HOW LIKELY ARE YOU TO NOD OFF OR FALL ASLEEP WHILE SITTING QUIETLY AFTER LUNCH WITHOUT ALCOHOL: 2
HOW LIKELY ARE YOU TO NOD OFF OR FALL ASLEEP IN A CAR, WHILE STOPPED FOR A FEW MINUTES IN TRAFFIC: 2
HOW LIKELY ARE YOU TO NOD OFF OR FALL ASLEEP WHILE WATCHING TV: 2
HOW LIKELY ARE YOU TO NOD OFF OR FALL ASLEEP WHILE SITTING INACTIVE IN A PUBLIC PLACE: 2
HOW LIKELY ARE YOU TO NOD OFF OR FALL ASLEEP WHILE SITTING AND TALKING TO SOMEONE: 2
HOW LIKELY ARE YOU TO NOD OFF OR FALL ASLEEP WHILE SITTING AND READING: 2
HOW LIKELY ARE YOU TO NOD OFF OR FALL ASLEEP WHEN YOU ARE A PASSENGER IN A CAR FOR AN HOUR WITHOUT A BREAK: 2

## 2022-07-12 NOTE — PROGRESS NOTES
Mariam Malone [facility]  [id number] [place] [address], [address] [facility]- [phone]   [facility] [address] Julio Cesar Wade. [address] [place] [address]. Ifeanyi Adams 37 [phone]     [address] [id number] [phone]      Assessment/Plan:      1. KATELIN (obstructive sleep apnea)  Assessment & Plan:  Chronic-Stable: Reviewed and analyzed results of physiologic download from patient's machine and reviewed with patient. Supplies and parts as needed for his machine. These are medically necessary. Limit caffeine use after 3pm. Based on the analyzed data will continue with current settings. 2. PAF (paroxysmal atrial fibrillation) ([facility])  Assessment & Plan:  Chronic- Stable. Discussed the importance of treating sleep apnea as part of the management of this disorder. Cont any meds per PCP and other physicians. 3. Essential hypertension  Assessment & Plan:  Chronic- Stable. Discussed the importance of treating sleep apnea as part of the management of this disorder. Cont any meds per PCP and other physicians. 4. Parkinson's disease ([facility] [address]  Assessment & Plan:  Chronic- Stable. Discussed the importance of treating sleep apnea as part of the management of this disorder. Cont any meds per PCP and other physicians. Patient should discuss with his neurologist about possibly weaning down the clonazepam to see if his REM behavior disorder symptoms have improved with treating just for sleep apnea. If he still struggling to sleep would recommend a trial of a dual Orexin receptor agonist medication for sleep since more current data shows improved benefit with insomnia with Parkinson's/Alzheimer's with this class of medications compared to trazodone or benzodiazepines. 5. Obesity (BMI 30.0-34. 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[employer]    Tobacco Use    Smoking status: Former Smoker     Packs/day: 1.00     Years: 15.00     Pack years: 15.00     Quit date: 1996     Years since quittin.1    Smokeless tobacco: Never Used   Vaping Use    Vaping Use: Never used   Substance and Sexual Activity    Alcohol use: Not Currently     Alcohol/week: 20.0 standard drinks     Types: 20 Standard drinks or equivalent per week     Comment: weekends ; quit 2020    Drug use: Not Currently     Types: Marijuana Yamila Kos)     Comment: occ    Sexual activity: Not on file   Other Topics Concern    Not on file   Social History Narrative    Grew up in [facility]    No hx of abuse     in  after 11 yrs of marriage    Has 1 daughter    Has 2 brothers, he is the middle child     Social Determinants of Health     Financial Resource Strain:     Difficulty of Paying Living Expenses: Not on file   Food Insecurity:     Worried About [address] [employer] in the Last Year: Not on file    Cathy of Food in the Last Year: Not on file   Transportation Needs:     Lack of Transportation (Medical): Not on file    Lack of Transportation (Non-Medical):  Not on file   Physical Activity:     Days of Exercise per Week: Not on file    Minutes of Exercise per Session: Not on file   Stress:     Feeling of Stress : Not on file   Social Connections:     Frequency of Communication with Friends and Family: Not on file    Frequency of Social Gatherings with Friends and Family: Not on file    Attends Mandaeism Services: Not on file    Active Member of Clubs or Organizations: Not on file    Attends Club or Organization Meetings: Not on file   Lakshmi Peraza Marital Status: Not on file   Intimate Partner Violence:     Fear of Current or Ex-Partner: Not on file    Emotionally Abused: Not on file    Physically Abused: Not on file    Sexually Abused: Not on file   Housing Stability:     Unable to Pay for Housing in the Last Year: Not on file    Number of Places Lived in the Last Year: Not on file    Unstable Housing in the Last Year: Not on file       Current Outpatient Medications   Medication Instructions    carbidopa-levodopa (SINEMET)  MG per tablet 3 tablets, Oral, 4 TIMES DAILY    clonazePAM (KLONOPIN) 0.5 MG tablet NIGHTLY PRN    dilTIAZem (CARDIZEM CD) 180 mg, Oral, DAILY    fexofenadine (ALLEGRA) 180 mg, Oral, DAILY    fluticasone (FLONASE) 50 MCG/ACT nasal spray SHAKE LIQUID AND USE 1 SPRAY IN EACH NOSTRIL DAILY    metoprolol (LOPRESSOR) 100 MG tablet TAKE 1 TABLET BY MOUTH TWICE DAILY          Vitals:  Weight BMI   Wt Readings from Last 3 Encounters:   07/12/22 242 lb 12.8 oz (110.1 kg)   03/15/22 246 lb (111.6 kg)   02/15/22 245 lb (111.1 kg)    Body mass index is 32.93 kg/m².      BP HR SaO2   BP Readings from Last 3 Encounters:   07/12/22 110/60   03/15/22 (!) 140/82   02/15/22 122/72    Pulse Readings from Last 3 Encounters:   07/12/22 69   03/15/22 55   02/15/22 78    SpO2 Readings from Last 3 Encounters:   07/12/22 97%   03/15/22 98%   02/15/22 97%        Electronically signed by Frantz Duncan MD on 7/12/2022 at 1:25 PM

## 2022-07-12 NOTE — ASSESSMENT & PLAN NOTE
Chronic- Stable. Discussed the importance of treating sleep apnea as part of the management of this disorder. Cont any meds per PCP and other physicians. Patient should discuss with his neurologist about possibly weaning down the clonazepam to see if his REM behavior disorder symptoms have improved with treating just for sleep apnea. If he still struggling to sleep would recommend a trial of a dual Orexin receptor agonist medication for sleep since more current data shows improved benefit with insomnia with Parkinson's/Alzheimer's with this class of medications compared to trazodone or benzodiazepines.

## 2022-07-12 NOTE — ASSESSMENT & PLAN NOTE
Chronic-Stable: Reviewed and analyzed results of physiologic download from patient's machine and reviewed with patient. Supplies and parts as needed for his machine. These are medically necessary. Limit caffeine use after 3pm. Based on the analyzed data will continue with current settings.

## 2022-07-12 NOTE — LETTER
[facility] Sleep Medicine  [address]  [address] [place] [address]  Phone: [phone]  Fax: [phone]    Sonia Hernandez MD    July 12, 2022     Cody Clinton, [address] [facility]    Patient: Masha Moura   MR Number: [id number]   YOB: 1960   Date of Visit: 7/12/2022       Dear Cody Clinton: Thank you for referring Varun Castellano to me for evaluation/treatment. Below are the relevant portions of my assessment and plan of care. 1. KATELIN (obstructive sleep apnea)  Assessment & Plan:  Chronic-Stable: Reviewed and analyzed results of physiologic download from patient's machine and reviewed with patient. Supplies and parts as needed for his machine. These are medically necessary. Limit caffeine use after 3pm. Based on the analyzed data will continue with current settings. 2. PAF (paroxysmal atrial fibrillation) ([facility])  Assessment & Plan:  Chronic- Stable. Discussed the importance of treating sleep apnea as part of the management of this disorder. Cont any meds per PCP and other physicians. 3. Essential hypertension  Assessment & Plan:  Chronic- Stable. Discussed the importance of treating sleep apnea as part of the management of this disorder. Cont any meds per PCP and other physicians. 4. Parkinson's disease ([facility] [address]  Assessment & Plan:  Chronic- Stable. Discussed the importance of treating sleep apnea as part of the management of this disorder. Cont any meds per PCP and other physicians. Patient should discuss with his neurologist about possibly weaning down the clonazepam to see if his REM behavior disorder symptoms have improved with treating just for sleep apnea.   If he still struggling to sleep would recommend a trial of a dual Orexin receptor agonist medication for sleep since more current data shows improved benefit with insomnia with Parkinson's/Alzheimer's with this class of medications compared to trazodone or

## 2022-07-22 ENCOUNTER — TELEPHONE (OUTPATIENT)
Dept: INTERNAL MEDICINE CLINIC | Age: 62
End: 2022-07-22

## 2022-07-22 DIAGNOSIS — U07.1 COVID-19: Primary | ICD-10-CM

## 2022-07-22 DIAGNOSIS — J40 BRONCHITIS: ICD-10-CM

## 2022-07-22 NOTE — TELEPHONE ENCOUNTER
Spoke to patient. His congestion is better than yesterday. He will use OTC medications for the meantime. He will call next week if not any better to be seen if Covid-19 results are negative.

## 2022-07-22 NOTE — TELEPHONE ENCOUNTER
Pt called saying he has bad cough, congestion, thinks it might be bronchitis. Irritation  Did take Covid test on 7/21. No results yet.

## 2022-07-25 NOTE — TELEPHONE ENCOUNTER
Pt did test positive for Covid 7/21 cough, explosive diarrhea, thinks he has a uti/ incontinence as well.  Wants to no if he can have something to help with symptoms

## 2022-07-26 ENCOUNTER — TELEPHONE (OUTPATIENT)
Dept: PULMONOLOGY | Age: 62
End: 2022-07-26

## 2022-07-26 ENCOUNTER — TELEPHONE (OUTPATIENT)
Dept: INTERNAL MEDICINE CLINIC | Age: 62
End: 2022-07-26

## 2022-07-26 RX ORDER — LEVOFLOXACIN 500 MG/1
500 TABLET, FILM COATED ORAL DAILY
Qty: 7 TABLET | Refills: 0 | Status: SHIPPED | OUTPATIENT
Start: 2022-07-26 | End: 2022-08-02

## 2022-07-26 NOTE — TELEPHONE ENCOUNTER
Pt called and stating he recently got covid and hasn't been able to use his machine because of it. He was wondering if that would affect his usage. He said he got his machine 2-3 months ago. I told him it would affect it if he doesn't use it regularly and that he should try to use it when he can.

## 2022-07-26 NOTE — TELEPHONE ENCOUNTER
Patient was recently diagnosed with covid-19   last Friday and wants to know if they should be seen or if they can be   prescribed any medication       Please advise

## 2022-07-26 NOTE — TELEPHONE ENCOUNTER
----- Message from Lorraine Álvarez sent at 7/26/2022  9:08 AM EDT -----  Subject: Message to Provider    QUESTIONS  Information for Provider? Patient was recently diagnosed with covid-19   last Friday and wants to know if they should be seen or if they can be   prescribed any medication  ---------------------------------------------------------------------------  --------------  Brooke PRATER  [phone]; OK to leave message on voicemail  ---------------------------------------------------------------------------  --------------  SCRIPT ANSWERS  Relationship to Patient?  Self

## 2022-08-05 ENCOUNTER — TELEPHONE (OUTPATIENT)
Dept: INTERNAL MEDICINE CLINIC | Age: 62
End: 2022-08-05

## 2022-08-05 NOTE — TELEPHONE ENCOUNTER
His symptoms are consistent with Raynauds syndrome. I don't think he has side effects from Paxlovid as he suspected. Covid-19 infection?      Mesha De La Rosa MD  FACP

## 2022-08-05 NOTE — TELEPHONE ENCOUNTER
ECC Red Flag for numbness on finger tips on both hands. Patient is complaining of pain now with the numbness. The numbness started last week. The pain started 2-3 days ago.  Patient was Dx with COVID last week and he is concerned is has something to do with the Paxlovid

## 2022-08-16 RX ORDER — METOPROLOL TARTRATE 100 MG/1
TABLET ORAL
Qty: 60 TABLET | Refills: 3 | Status: SHIPPED | OUTPATIENT
Start: 2022-08-16

## 2022-08-28 ENCOUNTER — HOSPITAL ENCOUNTER (EMERGENCY)
Age: 62
Discharge: HOME | End: 2022-08-28
Attending: EMERGENCY MEDICINE
Payer: MEDICARE

## 2022-08-28 ENCOUNTER — APPOINTMENT (OUTPATIENT)
Dept: CT IMAGING | Age: 62
End: 2022-08-28
Payer: MEDICARE

## 2022-08-28 VITALS
BODY MASS INDEX: 30.61 KG/M2 | OXYGEN SATURATION: 97 % | SYSTOLIC BLOOD PRESSURE: 107 MMHG | WEIGHT: 226 LBS | TEMPERATURE: 97.8 F | HEIGHT: 72 IN | DIASTOLIC BLOOD PRESSURE: 62 MMHG | HEART RATE: 53 BPM | RESPIRATION RATE: 23 BRPM

## 2022-08-28 DIAGNOSIS — R55 NEAR SYNCOPE: Primary | ICD-10-CM

## 2022-08-28 LAB
A/G RATIO: 1.4 (ref 1.1–2.2)
ALBUMIN SERPL-MCNC: 4 G/DL (ref 3.4–5)
ALP BLD-CCNC: 117 U/L (ref 40–129)
ALT SERPL-CCNC: <5 U/L (ref 10–40)
ANION GAP SERPL CALCULATED.3IONS-SCNC: 7 MMOL/L (ref 3–16)
ANISOCYTOSIS: ABNORMAL
AST SERPL-CCNC: <5 U/L (ref 15–37)
BASOPHILS ABSOLUTE: 0 K/UL (ref 0–0.2)
BASOPHILS RELATIVE PERCENT: 0.4 %
BILIRUB SERPL-MCNC: 0.5 MG/DL (ref 0–1)
BUN BLDV-MCNC: 13 MG/DL (ref 7–20)
CALCIUM SERPL-MCNC: 9.7 MG/DL (ref 8.3–10.6)
CHLORIDE BLD-SCNC: 101 MMOL/L (ref 99–110)
CO2: 26 MMOL/L (ref 21–32)
CREAT SERPL-MCNC: 0.8 MG/DL (ref 0.8–1.3)
EOSINOPHILS ABSOLUTE: 0.2 K/UL (ref 0–0.6)
EOSINOPHILS RELATIVE PERCENT: 3.3 %
GFR AFRICAN AMERICAN: >60
GFR NON-AFRICAN AMERICAN: >60
GLUCOSE BLD-MCNC: 95 MG/DL (ref 70–99)
HCT VFR BLD CALC: 35.3 % (ref 40.5–52.5)
HEMOGLOBIN: 11.8 G/DL (ref 13.5–17.5)
LYMPHOCYTES ABSOLUTE: 1.2 K/UL (ref 1–5.1)
LYMPHOCYTES RELATIVE PERCENT: 19.7 %
MCH RBC QN AUTO: 30.3 PG (ref 26–34)
MCHC RBC AUTO-ENTMCNC: 33.5 G/DL (ref 31–36)
MCV RBC AUTO: 90.4 FL (ref 80–100)
MONOCYTES ABSOLUTE: 0.6 K/UL (ref 0–1.3)
MONOCYTES RELATIVE PERCENT: 9.3 %
NEUTROPHILS ABSOLUTE: 4 K/UL (ref 1.7–7.7)
NEUTROPHILS RELATIVE PERCENT: 67.3 %
OVALOCYTES: ABNORMAL
PDW BLD-RTO: 19.2 % (ref 12.4–15.4)
PLATELET # BLD: 193 K/UL (ref 135–450)
PLATELET SLIDE REVIEW: ADEQUATE
PMV BLD AUTO: 9.4 FL (ref 5–10.5)
POLYCHROMASIA: ABNORMAL
POTASSIUM SERPL-SCNC: 4.7 MMOL/L (ref 3.5–5.1)
RBC # BLD: 3.91 M/UL (ref 4.2–5.9)
SLIDE REVIEW: ABNORMAL
SODIUM BLD-SCNC: 134 MMOL/L (ref 136–145)
TOTAL PROTEIN: 6.8 G/DL (ref 6.4–8.2)
TROPONIN: <0.01 NG/ML
WBC # BLD: 5.9 K/UL (ref 4–11)

## 2022-08-28 PROCEDURE — 80053 COMPREHEN METABOLIC PANEL: CPT

## 2022-08-28 PROCEDURE — 2580000003 HC RX 258: Performed by: EMERGENCY MEDICINE

## 2022-08-28 PROCEDURE — 93005 ELECTROCARDIOGRAM TRACING: CPT | Performed by: EMERGENCY MEDICINE

## 2022-08-28 PROCEDURE — 99284 EMERGENCY DEPT VISIT MOD MDM: CPT

## 2022-08-28 PROCEDURE — 84484 ASSAY OF TROPONIN QUANT: CPT

## 2022-08-28 PROCEDURE — 85025 COMPLETE CBC W/AUTO DIFF WBC: CPT

## 2022-08-28 PROCEDURE — 70450 CT HEAD/BRAIN W/O DYE: CPT

## 2022-08-28 RX ORDER — 0.9 % SODIUM CHLORIDE 0.9 %
1000 INTRAVENOUS SOLUTION INTRAVENOUS ONCE
Status: COMPLETED | OUTPATIENT
Start: 2022-08-28 | End: 2022-08-28

## 2022-08-28 RX ORDER — GABAPENTIN 100 MG/1
CAPSULE ORAL
COMMUNITY
Start: 2022-08-26

## 2022-08-28 RX ORDER — LANOLIN ALCOHOL/MO/W.PET/CERES
100 CREAM (GRAM) TOPICAL DAILY
COMMUNITY
Start: 2022-08-26

## 2022-08-28 RX ADMIN — SODIUM CHLORIDE 1000 ML: 9 INJECTION, SOLUTION INTRAVENOUS at 13:12

## 2022-08-28 ASSESSMENT — PAIN - FUNCTIONAL ASSESSMENT: PAIN_FUNCTIONAL_ASSESSMENT: NONE - DENIES PAIN

## 2022-08-28 NOTE — DISCHARGE INSTRUCTIONS
Do not take any more of your blood pressure medications today. Drink plenty of fluids. Return for any other problems or worsening of symptoms.

## 2022-08-28 NOTE — ED PROVIDER NOTES
[address] [facility]  [facility]      Pt Name: Erica Medina  MRN: [id number]  [place] 1960  Date ofevaluation: 8/28/2022  Provider: France Krishnamurthy MD    CHIEF COMPLAINT       Chief Complaint   Patient presents with    Dizziness     From friend's house via EMS cc dizziness, staring off into space, BP 90/60 in EMS, blood sugar 110, fall onto ground during this episode but denies injury from fall. Hx of similar episode in the past.       HPI    HISTORY OF PRESENT ILLNESS   (Location/Symptom, Timing/Onset,Context/Setting, Quality, Duration, Modifying Factors, Severity)  Note limiting factors. Erica Medina is a 60 y.o. male who presents to the emergency department after falling. This is a 74-year-old male who was in his girlfriend's backyard when he felt like he was seeing some stars or bright lights and fell forward. He did not lose consciousness according to his girlfriend. He denies any head pain. He denies any neck pain. Again, he did not lose consciousness. According to his girlfriend he was just staring around. The patient arrived he was mildly hypotensive. The patient's only new medication is gabapentin he was recently started on. NursingNotes were reviewed. Review of Systems    REVIEW OF SYSTEMS    (2-9 systems for level 4, 10 or more for level 5)     Review of Systems   Constitutional: Negative for fever. HENT: Negative for rhinorrhea and sore throat. Eyes: Negative for redness. Respiratory: Negative for shortness of breath. Cardiovascular: Negative for chest pain. Gastrointestinal: Negative for abdominal pain. Genitourinary: Negative for flank pain. Neurological: Negative for headaches. Hematological: Negative for adenopathy. Psychiatric/Behavioral: Negative for confusion. Except as noted above the remainder of the review of systems was reviewed and negative.        PAST MEDICAL HISTORY     Past Medical History:   Diagnosis Date    Anxiety     Atrial fibrillation ([facility] [address]     Atrial flutter (HCC)     Cervical arthritis 3/30/2012    CTS (carpal tunnel syndrome) 3/13/2013    Essential tremor 2017    Hypertension     KATELIN (obstructive sleep apnea)     DOES NOT USE C-PAP    Osteoarthritis of back 11/6/2014    Osteoarthritis of right hip 11/6/2014    Parkinson disease ([facility] [address]     S/P ablation of atrial fibrillation 2/25/2016    Radiofrequency ablation of atrial fibrillation and pulmonary veins isolation Additional ablation of complex atrial fractionated electrogram     Sinusitis     Trigger finger 12/10/2014         SURGICALHISTORY       Past Surgical History:   Procedure Laterality Date    ANKLE SURGERY  2015    ANKLE SURGERY Left     ATRIAL ABLATION SURGERY  1998    ATRIAL ABLATION SURGERY  2/25/16    RFCA of AF and PVI, additional ablation complex atrial fractioned electrogram    BACK SURGERY  1984    lumbar patino    CARDIOVERSION  4/6/16    CARPAL TUNNEL RELEASE Right 11/19/2013    RIGHT CARPAL TUNNEL RELEASE      CARPAL TUNNEL RELEASE Left 12-10-13     CARPAL TUNNEL RELEASE                COLONOSCOPY  10/29/2007    COLONOSCOPY N/A 7/30/2020    COLONOSCOPY POLYPECTOMY SNARE/COLD BIOPSY performed by Iris Mireles MD at [facility] HIP ARTHROPLASTY Right 2016    right hip replacement         CURRENT MEDICATIONS       Previous Medications    CARBIDOPA-LEVODOPA (SINEMET)  MG PER TABLET    Take 3 tablets by mouth 4 times daily    CLONAZEPAM (KLONOPIN) 0.5 MG TABLET    nightly as needed.      DILTIAZEM (CARDIZEM CD) 180 MG EXTENDED RELEASE CAPSULE    TAKE 1 CAPSULE BY MOUTH DAILY    FEXOFENADINE (ALLEGRA) 180 MG TABLET    Take 180 mg by mouth daily    FLUTICASONE (FLONASE) 50 MCG/ACT NASAL SPRAY    SHAKE LIQUID AND USE 1 SPRAY IN EACH NOSTRIL DAILY    GABAPENTIN (NEURONTIN) 100 MG CAPSULE METOPROLOL (LOPRESSOR) 100 MG TABLET    TAKE 1 TABLET BY MOUTH TWICE DAILY    THIAMINE 100 MG TABLET    Take 100 mg by mouth daily       ALLERGIES     Patient has no known allergies. FAMILY HISTORY       Family History   Problem Relation Age of Onset    High Blood Pressure Mother     High Cholesterol Mother     Alcohol Abuse Father     Other Other         afib    Heart Disease Neg Hx     Mental Illness Neg Hx           SOCIAL HISTORY       Social History     Socioeconomic History    Marital status:      Spouse name: None    Number of children: 1    Years of education: None    Highest education level: None   Occupational History    Occupation: Retired in      Comment: heavy equipment / [employer]    Tobacco Use    Smoking status: Former     Packs/day: 1.00     Years: 15.00     Pack years: 15.00     Types: Cigarettes     Quit date: 1996     Years since quittin.2    Smokeless tobacco: Never   Vaping Use    Vaping Use: Never used   Substance and Sexual Activity    Alcohol use: Not Currently     Alcohol/week: 20.0 standard drinks     Types: 20 Standard drinks or equivalent per week     Comment: weekends ; quit 2020    Drug use: Not Currently     Types: Marijuana Claudio Guzman)     Comment: occ   Social History Narrative    Grew up in [address] [facility]    No hx of abuse     in  after 11 yrs of marriage    Has 1 daughter    Has 2 brothers, he is the middle child       SCREENINGS    Robert Coma Scale  Eye Opening: Spontaneous  Best Verbal Response: Oriented  Best Motor Response: Obeys commands  Robert Coma Scale Score: 15        PHYSICAL EXAM    (up to 7 for level 4, 8 or more for level 5)     ED Triage Vitals [22 1222]   BP Temp Temp Source Heart Rate Resp SpO2 Height Weight   (!) 95/59 97.8 °F (36.6 °C) Oral 51 20 95 % 6' (1.829 m) 226 lb (102.5 kg)       Physical Exam:      General Appearance:  Alert, cooperative, appears stated age.    Head:  Normocephalic, without obvious abnormality, atraumatic. Eyes:  conjunctiva/corneas clear, EOM's intact. Sclera anicteric. ENT: Mucous memories are moist and pink   Neck: Supple, symmetrical, trachea midline, no adenopathy. No jugular venous distention. Lungs:   Clear to auscultation bilaterally   Chest Wall:  No pain to palpation   Heart:   Genitourinary: Regular rate and rhythm with no murmurs or gallops. Slightly bradycardic. Deferred   Abdomen:   Soft and benign. No guarding or rebound. No pulsatile masses. Extremities: No clubbing cyanosis or edema   Pulses: Good throughout   Skin:  No rashes or lesions to exposed skin. Neurologic: Alert and oriented X 3. DIAGNOSTIC RESULTS     EKG: All EKG's are interpreted by the Emergency Department Physician who either signs or Co-signsthis chart in the absence of a cardiologist.    Sinus bradycardia at a rate of 50 beats a minute with no acute ST elevations or depressions or pathologic Q waves. First-degree block. RADIOLOGY:   Non-plain filmimages such as CT, Ultrasound and MRI are read by the radiologist. Plain radiographic images are visualized and preliminarily interpreted by the emergency physician with the below findings:    See below    Interpretation per the Radiologist below, if available at the time ofthis note: All incidental findings were discussed with the patient. CT HEAD WO CONTRAST   Final Result   No acute intracranial abnormality.                ED BEDSIDE ULTRASOUND:   Performed by ED Physician - none    LABS:  Labs Reviewed   CBC WITH AUTO DIFFERENTIAL - Abnormal; Notable for the following components:       Result Value    RBC 3.91 (*)     Hemoglobin 11.8 (*)     Hematocrit 35.3 (*)     RDW 19.2 (*)     Anisocytosis Occasional (*)     Polychromasia Occasional (*)     Ovalocytes Occasional (*)     All other components within normal limits   COMPREHENSIVE METABOLIC PANEL - Abnormal; Notable for the following components:    Sodium 134 (*) ALT <5 (*)     AST <5 (*)     All other components within normal limits   TROPONIN       All other labs were within normal range or not returned as of this dictation. EMERGENCY DEPARTMENT COURSE and DIFFERENTIAL DIAGNOSIS/MDM:   Vitals:    Vitals:    08/28/22 1315 08/28/22 1345 08/28/22 1400 08/28/22 1405   BP: 95/63 118/66 113/62 99/62   Pulse: 53 50 (!) 49 52   Resp: 21 18 21 15   Temp:       TempSrc:       SpO2: 95% 100% 98% 98%   Weight:       Height:               MDM      The patient has remained stable throughout his hospital course. He was somewhat bradycardic. The patient did handle his orthostatics well and without difficulty. His work-up included a CT of the head that was negative and laboratory results are unremarkable. I am told the patient not to take his blood pressure medication tonight. He currently feels fine. He will be discharged with appropriate follow-up and instructed to return if worse. REASSESSMENT              CONSULTS:  None    PROCEDURES:  Unless otherwise noted below, none     Procedures    FINAL IMPRESSION      1. Near syncope          DISPOSITION/PLAN   DISPOSITION Decision To Discharge 08/28/2022 02:59:26 PM      PATIENT REFERREDTO:  Guy Wright, [facility] [address] [facility]  [phone]    Call in 1 day        DISCHARGEMEDICATIONS:  New Prescriptions    No medications on file     Controlled Substances Monitoring:     RX Monitoring 2/7/2017   Attestation The Prescription Monitoring Report for this patient was reviewed today.    Periodic Controlled Substance Monitoring -       (Please note that portions of this note were completed with a voice recognition program.  Efforts were made to edit the dictations but occasionally words are mis-transcribed.)    John Liriano MD (electronically signed)  Attending Emergency Physician          John Liriano MD  08/28/22 1773

## 2022-08-29 ENCOUNTER — OFFICE VISIT (OUTPATIENT)
Dept: INTERNAL MEDICINE CLINIC | Age: 62
End: 2022-08-29
Payer: MEDICARE

## 2022-08-29 VITALS
TEMPERATURE: 97.3 F | DIASTOLIC BLOOD PRESSURE: 76 MMHG | OXYGEN SATURATION: 97 % | WEIGHT: 228 LBS | SYSTOLIC BLOOD PRESSURE: 128 MMHG | HEART RATE: 96 BPM | BODY MASS INDEX: 30.92 KG/M2

## 2022-08-29 DIAGNOSIS — T67.1XXD HEAT SYNCOPE, SUBSEQUENT ENCOUNTER: Primary | ICD-10-CM

## 2022-08-29 DIAGNOSIS — E87.1 HYPONATREMIA: ICD-10-CM

## 2022-08-29 DIAGNOSIS — E86.0 DEHYDRATION: ICD-10-CM

## 2022-08-29 LAB
EKG ATRIAL RATE: 50 BPM
EKG DIAGNOSIS: NORMAL
EKG P AXIS: 16 DEGREES
EKG P-R INTERVAL: 234 MS
EKG Q-T INTERVAL: 442 MS
EKG QRS DURATION: 82 MS
EKG QTC CALCULATION (BAZETT): 402 MS
EKG R AXIS: -2 DEGREES
EKG T AXIS: 15 DEGREES
EKG VENTRICULAR RATE: 50 BPM

## 2022-08-29 PROCEDURE — 93010 ELECTROCARDIOGRAM REPORT: CPT | Performed by: INTERNAL MEDICINE

## 2022-08-29 PROCEDURE — 99213 OFFICE O/P EST LOW 20 MIN: CPT | Performed by: INTERNAL MEDICINE

## 2022-08-29 RX ORDER — CYANOCOBALAMIN 1000 UG/ML
1000 INJECTION INTRAMUSCULAR; SUBCUTANEOUS WEEKLY
COMMUNITY
Start: 2022-08-29 | End: 2023-02-07

## 2022-08-29 RX ORDER — FOLIC ACID 1 MG/1
1 TABLET ORAL DAILY
COMMUNITY
Start: 2022-08-29

## 2022-08-29 SDOH — ECONOMIC STABILITY: FOOD INSECURITY: WITHIN THE PAST 12 MONTHS, THE FOOD YOU BOUGHT JUST DIDN'T LAST AND YOU DIDN'T HAVE MONEY TO GET MORE.: NEVER TRUE

## 2022-08-29 SDOH — ECONOMIC STABILITY: FOOD INSECURITY: WITHIN THE PAST 12 MONTHS, YOU WORRIED THAT YOUR FOOD WOULD RUN OUT BEFORE YOU GOT MONEY TO BUY MORE.: NEVER TRUE

## 2022-08-29 ASSESSMENT — PATIENT HEALTH QUESTIONNAIRE - PHQ9
10. IF YOU CHECKED OFF ANY PROBLEMS, HOW DIFFICULT HAVE THESE PROBLEMS MADE IT FOR YOU TO DO YOUR WORK, TAKE CARE OF THINGS AT HOME, OR GET ALONG WITH OTHER PEOPLE: 1
SUM OF ALL RESPONSES TO PHQ QUESTIONS 1-9: 9
SUM OF ALL RESPONSES TO PHQ QUESTIONS 1-9: 9
7. TROUBLE CONCENTRATING ON THINGS, SUCH AS READING THE NEWSPAPER OR WATCHING TELEVISION: 0
SUM OF ALL RESPONSES TO PHQ QUESTIONS 1-9: 9
2. FEELING DOWN, DEPRESSED OR HOPELESS: 0
SUM OF ALL RESPONSES TO PHQ9 QUESTIONS 1 & 2: 0
6. FEELING BAD ABOUT YOURSELF - OR THAT YOU ARE A FAILURE OR HAVE LET YOURSELF OR YOUR FAMILY DOWN: 0
9. THOUGHTS THAT YOU WOULD BE BETTER OFF DEAD, OR OF HURTING YOURSELF: 0
8. MOVING OR SPEAKING SO SLOWLY THAT OTHER PEOPLE COULD HAVE NOTICED. OR THE OPPOSITE, BEING SO FIGETY OR RESTLESS THAT YOU HAVE BEEN MOVING AROUND A LOT MORE THAN USUAL: 0
1. LITTLE INTEREST OR PLEASURE IN DOING THINGS: 0
5. POOR APPETITE OR OVEREATING: 3
4. FEELING TIRED OR HAVING LITTLE ENERGY: 3
SUM OF ALL RESPONSES TO PHQ QUESTIONS 1-9: 9
3. TROUBLE FALLING OR STAYING ASLEEP: 3

## 2022-08-29 ASSESSMENT — SOCIAL DETERMINANTS OF HEALTH (SDOH): HOW HARD IS IT FOR YOU TO PAY FOR THE VERY BASICS LIKE FOOD, HOUSING, MEDICAL CARE, AND HEATING?: NOT HARD AT ALL

## 2022-08-29 NOTE — PROGRESS NOTES
Chief Complaint   Patient presents with    Loss of Consciousness     Patient comes to the office after having a syncopal episode 1 day prior to today's visit. Patient blood pressure in the emergency room was very low and he was noted to be bradycardic. He has been taking diltiazem and metoprolol. His heart rate has been controlled in the context of paroxysmal atrial fibrillation. He was in sinus rhythm yesterday. Patient reports that he had been working in his pool outside in extremely hot weather and this event occurred thereafter. Assessment/Plan:  Jefferson Saucedo was seen today for loss of consciousness. Diagnoses and all orders for this visit:    Heat syncope, subsequent encounter  Comments:  Hold Metoprolol. Take Diltiazem    Hyponatremia  Comments:  Repeat basic metabolic profile in 1 week. Orders:  -     Basic Metabolic Panel; Future    Dehydration  Comments:  Drinkn plenty of fluids. Vitals:    08/29/22 1530   BP: 128/76   Pulse: 96   Temp: 97.3 °F (36.3 °C)   SpO2: 97%       Physical Exam  Vitals reviewed. Constitutional:       General: He is not in acute distress. Appearance: He is well-developed. He is not diaphoretic. HENT:      Head: Normocephalic and atraumatic. Cardiovascular:      Rate and Rhythm: Normal rate and regular rhythm. Pulses: Normal pulses. Heart sounds: No murmur heard. No friction rub. No gallop. Pulmonary:      Effort: Pulmonary effort is normal. No respiratory distress. Breath sounds: Normal breath sounds. No stridor. No wheezing, rhonchi or rales. Chest:      Chest wall: No tenderness. Neurological:      Mental Status: He is alert and oriented to person, place, and time. Cranial Nerves: No cranial nerve deficit. Psychiatric:         Behavior: Behavior normal.         Thought Content:  Thought content normal.         Judgment: Judgment normal.       PHQ Scores 8/29/2022 12/20/2021 12/15/2021 6/18/2021 12/18/2020 6/17/2020 10/24/2018 PHQ2 Score 0 0 0 0 0 0 2   PHQ9 Score 9 0 0 0 0 0 10     Interpretation of Total Score Depression Severity: 1-4 = Minimal depression, 5-9 = Mild depression, 10-14 = Moderate depression, 15-19 = Moderately severe depression, 20-27 = Severe depression    MD Lachelle Ornelas

## 2022-09-06 DIAGNOSIS — E87.1 HYPONATREMIA: ICD-10-CM

## 2022-09-06 LAB
ANION GAP SERPL CALCULATED.3IONS-SCNC: 15 MMOL/L (ref 3–16)
BUN BLDV-MCNC: 11 MG/DL (ref 7–20)
CALCIUM SERPL-MCNC: 9.7 MG/DL (ref 8.3–10.6)
CHLORIDE BLD-SCNC: 100 MMOL/L (ref 99–110)
CO2: 22 MMOL/L (ref 21–32)
CREAT SERPL-MCNC: 0.8 MG/DL (ref 0.8–1.3)
GFR AFRICAN AMERICAN: >60
GFR NON-AFRICAN AMERICAN: >60
GLUCOSE BLD-MCNC: 91 MG/DL (ref 70–99)
POTASSIUM SERPL-SCNC: 4.6 MMOL/L (ref 3.5–5.1)
SODIUM BLD-SCNC: 137 MMOL/L (ref 136–145)

## 2022-09-07 NOTE — TELEPHONE ENCOUNTER
The pharmacy sent a \"message to prescriber\" requesting a refill for Trazodone for a 90 day supply. Please advise.      Last OV : 12/11/2017    Last RF : 12/11/2017 Xelreemaz Pregnancy And Lactation Text: This medication is Pregnancy Category D and is not considered safe during pregnancy.  The risk during breast feeding is also uncertain.

## 2022-09-12 RX ORDER — DILTIAZEM HYDROCHLORIDE 180 MG/1
180 CAPSULE, COATED, EXTENDED RELEASE ORAL DAILY
Qty: 90 CAPSULE | Refills: 3 | Status: SHIPPED | OUTPATIENT
Start: 2022-09-12

## 2022-09-26 ENCOUNTER — TELEPHONE (OUTPATIENT)
Dept: CARDIOLOGY CLINIC | Age: 62
End: 2022-09-26

## 2022-09-26 DIAGNOSIS — R55 SYNCOPE, UNSPECIFIED SYNCOPE TYPE: Primary | ICD-10-CM

## 2022-09-26 DIAGNOSIS — Z86.79 H/O ATRIAL FIBRILLATION WITHOUT CURRENT MEDICATION: ICD-10-CM

## 2022-09-26 NOTE — TELEPHONE ENCOUNTER
Please have him come in and have a monitor placed. If Afib is intermittent he may not be in it for EKG. 30 days - with follow up scheduled 6-8 weeks.  If he has afib we will call him and he  may  need to be restarted on DOAC

## 2022-09-26 NOTE — TELEPHONE ENCOUNTER
Pt states he is having intermittent AFIB over the last 2 days. Pt denies SOB, Dizziness. Pt would like to see if he can come in for EKG. Please advise.

## 2022-09-27 ENCOUNTER — NURSE ONLY (OUTPATIENT)
Dept: CARDIOLOGY CLINIC | Age: 62
End: 2022-09-27

## 2022-09-27 DIAGNOSIS — R55 SYNCOPE AND COLLAPSE: Primary | ICD-10-CM

## 2022-09-27 NOTE — PROGRESS NOTES
Patient came in today for 30 day monitor placement. All information was given and shown with understanding.

## 2022-09-29 ENCOUNTER — TELEPHONE (OUTPATIENT)
Dept: CARDIOLOGY CLINIC | Age: 62
End: 2022-09-29

## 2022-09-29 NOTE — TELEPHONE ENCOUNTER
Urgent MCOT - afib rate 136. Reviewed with NPSR. Resume xarelto -    Spoke with Leigh Gerard - he did notice this episode and states he is used to it. Adivised to resume Xarelto and continue to wear monitor. He will call with increasing episodes or worsening symptoms. Will wait for monitor results and schedule follow up if high burden or worsening symptoms.

## 2022-09-30 ENCOUNTER — TELEPHONE (OUTPATIENT)
Dept: CARDIOLOGY CLINIC | Age: 62
End: 2022-09-30

## 2022-09-30 NOTE — TELEPHONE ENCOUNTER
Medication Samples    Medication: rivaroxaban (XARELTO)      Dosage of the medication: 20mg    How are you taking this medication (QD, BID, TID, QID, PRN):   Take 1 tablet by mouth daily (with breakfast)     in the office or Mail to your home?  in the office - If there are any samples please call. Thank you    NOTE:   Per Pt the med is expensive. The cost per Pt is $300 for 90 days.   Please advise

## 2022-09-30 NOTE — TELEPHONE ENCOUNTER
Samples 4 bottles  Lot#; [id number]  Exp 3/2025    Placed at  and call placed to notify patient, he will  this afternoon

## 2022-11-01 PROCEDURE — 93228 REMOTE 30 DAY ECG REV/REPORT: CPT | Performed by: INTERNAL MEDICINE

## 2022-11-07 ENCOUNTER — TELEPHONE (OUTPATIENT)
Dept: ORTHOPEDIC SURGERY | Age: 62
End: 2022-11-07

## 2022-11-07 ENCOUNTER — OFFICE VISIT (OUTPATIENT)
Dept: ORTHOPEDIC SURGERY | Age: 62
End: 2022-11-07
Payer: MEDICARE

## 2022-11-07 ENCOUNTER — TELEPHONE (OUTPATIENT)
Dept: CARDIOLOGY CLINIC | Age: 62
End: 2022-11-07

## 2022-11-07 VITALS — HEIGHT: 72 IN | BODY MASS INDEX: 31.56 KG/M2 | WEIGHT: 233 LBS

## 2022-11-07 DIAGNOSIS — M25.561 RIGHT KNEE PAIN, UNSPECIFIED CHRONICITY: Primary | ICD-10-CM

## 2022-11-07 PROCEDURE — 1036F TOBACCO NON-USER: CPT | Performed by: ORTHOPAEDIC SURGERY

## 2022-11-07 PROCEDURE — G8484 FLU IMMUNIZE NO ADMIN: HCPCS | Performed by: ORTHOPAEDIC SURGERY

## 2022-11-07 PROCEDURE — 99203 OFFICE O/P NEW LOW 30 MIN: CPT | Performed by: ORTHOPAEDIC SURGERY

## 2022-11-07 PROCEDURE — G8417 CALC BMI ABV UP PARAM F/U: HCPCS | Performed by: ORTHOPAEDIC SURGERY

## 2022-11-07 PROCEDURE — G8427 DOCREV CUR MEDS BY ELIG CLIN: HCPCS | Performed by: ORTHOPAEDIC SURGERY

## 2022-11-07 PROCEDURE — 20610 DRAIN/INJ JOINT/BURSA W/O US: CPT | Performed by: ORTHOPAEDIC SURGERY

## 2022-11-07 PROCEDURE — 3017F COLORECTAL CA SCREEN DOC REV: CPT | Performed by: ORTHOPAEDIC SURGERY

## 2022-11-07 RX ORDER — LIDOCAINE HYDROCHLORIDE 10 MG/ML
20 INJECTION, SOLUTION INFILTRATION; PERINEURAL ONCE
Status: COMPLETED | OUTPATIENT
Start: 2022-11-07 | End: 2022-11-07

## 2022-11-07 RX ORDER — TRIAMCINOLONE ACETONIDE 40 MG/ML
40 INJECTION, SUSPENSION INTRA-ARTICULAR; INTRAMUSCULAR ONCE
Status: COMPLETED | OUTPATIENT
Start: 2022-11-07 | End: 2022-11-07

## 2022-11-07 RX ADMIN — TRIAMCINOLONE ACETONIDE 40 MG: 40 INJECTION, SUSPENSION INTRA-ARTICULAR; INTRAMUSCULAR at 15:42

## 2022-11-07 RX ADMIN — LIDOCAINE HYDROCHLORIDE 20 ML: 10 INJECTION, SOLUTION INFILTRATION; PERINEURAL at 15:41

## 2022-11-07 NOTE — TELEPHONE ENCOUNTER
Pt dropped off appeal forms that he received. He states someone here told him to bring them in, but couldn't remember who called him. Forms are back in the EP room by the door. Please call pt to advise. Thank you.

## 2022-11-15 ENCOUNTER — OFFICE VISIT (OUTPATIENT)
Dept: PULMONOLOGY | Age: 62
End: 2022-11-15
Payer: MEDICARE

## 2022-11-15 VITALS
OXYGEN SATURATION: 97 % | BODY MASS INDEX: 31.42 KG/M2 | SYSTOLIC BLOOD PRESSURE: 135 MMHG | DIASTOLIC BLOOD PRESSURE: 62 MMHG | WEIGHT: 232 LBS | HEART RATE: 83 BPM | HEIGHT: 72 IN

## 2022-11-15 DIAGNOSIS — E66.9 OBESITY (BMI 30.0-34.9): Chronic | ICD-10-CM

## 2022-11-15 DIAGNOSIS — I10 ESSENTIAL HYPERTENSION: Chronic | ICD-10-CM

## 2022-11-15 DIAGNOSIS — G47.33 OSA (OBSTRUCTIVE SLEEP APNEA): Chronic | ICD-10-CM

## 2022-11-15 DIAGNOSIS — I48.0 PAF (PAROXYSMAL ATRIAL FIBRILLATION) (HCC): Chronic | ICD-10-CM

## 2022-11-15 DIAGNOSIS — G20 PARKINSON'S DISEASE (HCC): Chronic | ICD-10-CM

## 2022-11-15 PROCEDURE — 3017F COLORECTAL CA SCREEN DOC REV: CPT | Performed by: INTERNAL MEDICINE

## 2022-11-15 PROCEDURE — G8484 FLU IMMUNIZE NO ADMIN: HCPCS | Performed by: INTERNAL MEDICINE

## 2022-11-15 PROCEDURE — 3074F SYST BP LT 130 MM HG: CPT | Performed by: INTERNAL MEDICINE

## 2022-11-15 PROCEDURE — 99214 OFFICE O/P EST MOD 30 MIN: CPT | Performed by: INTERNAL MEDICINE

## 2022-11-15 PROCEDURE — G8427 DOCREV CUR MEDS BY ELIG CLIN: HCPCS | Performed by: INTERNAL MEDICINE

## 2022-11-15 PROCEDURE — 3078F DIAST BP <80 MM HG: CPT | Performed by: INTERNAL MEDICINE

## 2022-11-15 PROCEDURE — G8417 CALC BMI ABV UP PARAM F/U: HCPCS | Performed by: INTERNAL MEDICINE

## 2022-11-15 PROCEDURE — 1036F TOBACCO NON-USER: CPT | Performed by: INTERNAL MEDICINE

## 2022-11-15 ASSESSMENT — SLEEP AND FATIGUE QUESTIONNAIRES
HOW LIKELY ARE YOU TO NOD OFF OR FALL ASLEEP WHILE SITTING INACTIVE IN A PUBLIC PLACE: 0
HOW LIKELY ARE YOU TO NOD OFF OR FALL ASLEEP WHILE SITTING AND TALKING TO SOMEONE: 0
HOW LIKELY ARE YOU TO NOD OFF OR FALL ASLEEP WHILE SITTING AND READING: 1
HOW LIKELY ARE YOU TO NOD OFF OR FALL ASLEEP WHILE WATCHING TV: 1
HOW LIKELY ARE YOU TO NOD OFF OR FALL ASLEEP WHILE LYING DOWN TO REST IN THE AFTERNOON WHEN CIRCUMSTANCES PERMIT: 1
ESS TOTAL SCORE: 5
HOW LIKELY ARE YOU TO NOD OFF OR FALL ASLEEP WHILE SITTING QUIETLY AFTER LUNCH WITHOUT ALCOHOL: 1
HOW LIKELY ARE YOU TO NOD OFF OR FALL ASLEEP IN A CAR, WHILE STOPPED FOR A FEW MINUTES IN TRAFFIC: 0
HOW LIKELY ARE YOU TO NOD OFF OR FALL ASLEEP WHEN YOU ARE A PASSENGER IN A CAR FOR AN HOUR WITHOUT A BREAK: 1

## 2022-11-15 NOTE — PROGRESS NOTES
Sudheer Kiser [facility]  [address] [facility], [address] [facility]- [phone]   [facility] Jorge Vigil. [address] [place] [address]. Ifeanyi Adams 37 [phone]     [address] [address] [phone]      Assessment/Plan:      1. KATELIN (obstructive sleep apnea)  Assessment & Plan:  Chronic-Stable: Reviewed and analyzed results of physiologic download from patient's machine and reviewed with patient. Supplies and parts as needed for his machine. These are medically necessary. Limit caffeine use after 3pm. Based on the analyzed data will continue with current settings. Encourage patient uses machine as much as possible. 2. Essential hypertension  Assessment & Plan:  Chronic- Stable. Discussed the importance of treating sleep apnea as part of the management of this disorder. Cont any meds per PCP and other physicians. 3. PAF (paroxysmal atrial fibrillation) ([facility])  Assessment & Plan:  Chronic- Stable. Discussed the importance of treating sleep apnea as part of the management of this disorder. Cont any meds per PCP and other physicians. 4. Parkinson's disease ([facility] [address]  Assessment & Plan:  Chronic- Stable. Discussed the importance of treating sleep apnea as part of the management of this disorder. Cont any meds per PCP and other physicians. Patient should discuss with his neurologist about possibly weaning down the clonazepam to see if his REM behavior disorder symptoms have improved with treating just for sleep apnea. If he still struggling to sleep would recommend a trial of a dual Orexin receptor agonist medication for sleep since more current data shows improved benefit with insomnia with Parkinson's/Alzheimer's with this class of medications compared to trazodone or benzodiazepines. 5. Obesity (BMI 30.0-34. 9)  Assessment & Plan:  Chronic-not stable:  Discussed importance of treating obstructive sleep apnea and getting sufficient sleep to assist with weight control. Encouraged him to work on weight loss through diet and exercise. Recommended DASH or Mediterranean diets. Reviewed, analyzed, and documented physiologic data from patient's PAP machine. This information was analyzed to assess complexity and medical decision making in regards to further testing and management. Diagnoses of KATELIN (obstructive sleep apnea), Essential hypertension, PAF (paroxysmal atrial fibrillation) ([facility] [address] Parkinson's disease ([facility] [address] and Obesity (BMI 30.0-34.9) were pertinent to this visit. The chronic medical conditions listed are directly related to the primary diagnosis listed above. The management of the primary diagnosis affects the secondary diagnosis and vice versa. Subjective:   Subjective   Patient ID: Jones Collet is a 60 y.o. male. Chief Complaint   Patient presents with    Sleep Apnea       HPI:  Machine Modem/Download Info:  Compliance (hours/night): 4.5 hrs/night  % of nights >= 4 hrs: 60 %  Download AHI (/hour): 0.2 /HR  Average CPAP Pressure : 8.4 cmH2O APAP - Settings  Pressure Min: 7 cmH2O  Pressure Max: 17 cmH2O                 Comfort Settings  Flex/EPR (0-3): 0       Was trying to do better with his machine but then got COVID in August and was off his machine for several weeks. He was quite sick and lost significant weight. He started using his machine again and feels he is doing much better this time. He is feeling more rested and sleeping better overall. He still taking 0.5 mg of clonazepam from his neurologist for REM behavior disorder and his Parkinson's but feels overall he is the most stable he has been in quite some time.     [facility]    [place] Sleepiness Score: 5    Social History     Socioeconomic History    Marital status:      Spouse name: Not on file    Number of children: 1    Years of education: Not on file Highest education level: Not on file   Occupational History    Occupation: Retired in      Comment: heavy equipment / [employer]    Tobacco Use    Smoking status: Former     Packs/day: 1.00     Years: 15.00     Pack years: 15.00     Types: Cigarettes     Quit date: 1996     Years since quittin.5    Smokeless tobacco: Never   Vaping Use    Vaping Use: Never used   Substance and Sexual Activity    Alcohol use: Not Currently     Alcohol/week: 20.0 standard drinks     Types: 20 Standard drinks or equivalent per week     Comment: weekends ; quit 2020    Drug use: Not Currently     Types: Marijuana Laveda Musc[place])     Comment: occ    Sexual activity: Not on file   Other Topics Concern    Not on file   Social History Narrative    Grew up in [facility]    No hx of abuse     in  after 6 yrs of marriage    Has 1 daughter    Has 2 brothers, he is the middle child     Social Determinants of Health     Financial Resource Strain: Low Risk     Difficulty of Paying Living Expenses: Not hard at all   Food Insecurity: No Food Insecurity    Worried About Running Out of Food in the Last Year: Never true    Ran Out of Food in the Last Year: Never true   Transportation Needs: Not on file   Physical Activity: Not on file   Stress: Not on file   Social Connections: Not on file   Intimate Partner Violence: Not on file   Housing Stability: Not on file       Current Outpatient Medications   Medication Instructions    carbidopa-levodopa (SINEMET)  MG per tablet 3 tablets, Oral, 4 TIMES DAILY    clonazePAM (KLONOPIN) 0.5 MG tablet NIGHTLY PRN    cyanocobalamin 1,000 mcg, IntraMUSCular, WEEKLY    dilTIAZem (CARDIZEM CD) 180 mg, Oral, DAILY    fexofenadine (ALLEGRA) 180 mg, Oral, DAILY    fluticasone (FLONASE) 50 MCG/ACT nasal spray SHAKE LIQUID AND USE 1 SPRAY IN EACH NOSTRIL DAILY    folic acid (FOLVITE) 1 mg, Oral, DAILY    gabapentin (NEURONTIN) 100 MG capsule No dose, route, or frequency recorded. metoprolol (LOPRESSOR) 100 MG tablet TAKE 1 TABLET BY MOUTH TWICE DAILY    rivaroxaban (XARELTO) 20 mg, Oral, DAILY WITH BREAKFAST    thiamine 100 mg, Oral, DAILY          Vitals:  Weight BMI   Wt Readings from Last 3 Encounters:   11/15/22 232 lb (105.2 kg)   11/07/22 233 lb (105.7 kg)   08/29/22 228 lb (103.4 kg)    Body mass index is 31.46 kg/m².      BP HR SaO2   BP Readings from Last 3 Encounters:   11/15/22 135/62   08/29/22 128/76   08/28/22 107/62    Pulse Readings from Last 3 Encounters:   11/15/22 83   08/29/22 96   08/28/22 53    SpO2 Readings from Last 3 Encounters:   11/15/22 97%   08/29/22 97%   08/28/22 97%        Electronically signed by Ele Reza MD on 11/15/2022 at 11:14 AM

## 2022-11-15 NOTE — ASSESSMENT & PLAN NOTE
Chronic-Stable: Reviewed and analyzed results of physiologic download from patient's machine and reviewed with patient. Supplies and parts as needed for his machine. These are medically necessary. Limit caffeine use after 3pm. Based on the analyzed data will continue with current settings. Encourage patient uses machine as much as possible.

## 2022-11-15 NOTE — LETTER
[facility]  [address]  [address] [place] [address]  Phone: [phone]  Fax: [phone]    Cady Herrera MD    November 15, 2022     Dwight Abraham, [address] [facility]    Patient: Ariela Cedeño   MR Number: [id number]   YOB: 1960   Date of Visit: 11/15/2022       Dear Dwight Abraham: Thank you for referring Devin Cabral to me for evaluation/treatment. Below are the relevant portions of my assessment and plan of care. 1. KATELIN (obstructive sleep apnea)  Assessment & Plan:  Chronic-Stable: Reviewed and analyzed results of physiologic download from patient's machine and reviewed with patient. Supplies and parts as needed for his machine. These are medically necessary. Limit caffeine use after 3pm. Based on the analyzed data will continue with current settings. Encourage patient uses machine as much as possible. 2. Essential hypertension  Assessment & Plan:  Chronic- Stable. Discussed the importance of treating sleep apnea as part of the management of this disorder. Cont any meds per PCP and other physicians. 3. PAF (paroxysmal atrial fibrillation) ([facility])  Assessment & Plan:  Chronic- Stable. Discussed the importance of treating sleep apnea as part of the management of this disorder. Cont any meds per PCP and other physicians. 4. Parkinson's disease ([facility] [address]  Assessment & Plan:  Chronic- Stable. Discussed the importance of treating sleep apnea as part of the management of this disorder. Cont any meds per PCP and other physicians. Patient should discuss with his neurologist about possibly weaning down the clonazepam to see if his REM behavior disorder symptoms have improved with treating just for sleep apnea.   If he still struggling to sleep would recommend a trial of a dual Orexin receptor agonist medication for sleep since more current data shows improved benefit with insomnia with Parkinson's/Alzheimer's with this class of medications compared to trazodone or benzodiazepines. 5. Obesity (BMI 30.0-34. 9)  Assessment & Plan:  Chronic-not stable:  Discussed importance of treating obstructive sleep apnea and getting sufficient sleep to assist with weight control. Encouraged him to work on weight loss through diet and exercise. Recommended DASH or Mediterranean diets. Reviewed, analyzed, and documented physiologic data from patient's PAP machine. This information was analyzed to assess complexity and medical decision making in regards to further testing and management. Diagnoses of KATELIN (obstructive sleep apnea), Essential hypertension, PAF (paroxysmal atrial fibrillation) ([facility] [address] Parkinson's disease ([facility] [address] and Obesity (BMI 30.0-34.9) were pertinent to this visit. The chronic medical conditions listed are directly related to the primary diagnosis listed above. The management of the primary diagnosis affects the secondary diagnosis and vice versa. If you have questions, please do not hesitate to call me. I look forward to following Kelly Peterson along with you.     Sincerely,      Piero Ramos MD

## 2022-11-21 ENCOUNTER — OFFICE VISIT (OUTPATIENT)
Dept: INTERNAL MEDICINE CLINIC | Age: 62
End: 2022-11-21
Payer: MEDICARE

## 2022-11-21 VITALS
WEIGHT: 233 LBS | HEART RATE: 68 BPM | TEMPERATURE: 97 F | DIASTOLIC BLOOD PRESSURE: 84 MMHG | OXYGEN SATURATION: 97 % | SYSTOLIC BLOOD PRESSURE: 122 MMHG | BODY MASS INDEX: 31.6 KG/M2

## 2022-11-21 DIAGNOSIS — M17.11 PRIMARY OSTEOARTHRITIS OF RIGHT KNEE: ICD-10-CM

## 2022-11-21 DIAGNOSIS — G20 PARKINSON'S DISEASE (HCC): Primary | Chronic | ICD-10-CM

## 2022-11-21 DIAGNOSIS — I10 ESSENTIAL HYPERTENSION: Chronic | ICD-10-CM

## 2022-11-21 DIAGNOSIS — E66.9 OBESITY (BMI 30.0-34.9): Chronic | ICD-10-CM

## 2022-11-21 DIAGNOSIS — I48.0 PAF (PAROXYSMAL ATRIAL FIBRILLATION) (HCC): Chronic | ICD-10-CM

## 2022-11-21 PROCEDURE — G8417 CALC BMI ABV UP PARAM F/U: HCPCS | Performed by: INTERNAL MEDICINE

## 2022-11-21 PROCEDURE — 3017F COLORECTAL CA SCREEN DOC REV: CPT | Performed by: INTERNAL MEDICINE

## 2022-11-21 PROCEDURE — 1036F TOBACCO NON-USER: CPT | Performed by: INTERNAL MEDICINE

## 2022-11-21 PROCEDURE — G8427 DOCREV CUR MEDS BY ELIG CLIN: HCPCS | Performed by: INTERNAL MEDICINE

## 2022-11-21 PROCEDURE — G8484 FLU IMMUNIZE NO ADMIN: HCPCS | Performed by: INTERNAL MEDICINE

## 2022-11-21 PROCEDURE — 3074F SYST BP LT 130 MM HG: CPT | Performed by: INTERNAL MEDICINE

## 2022-11-21 PROCEDURE — 3078F DIAST BP <80 MM HG: CPT | Performed by: INTERNAL MEDICINE

## 2022-11-21 PROCEDURE — 99213 OFFICE O/P EST LOW 20 MIN: CPT | Performed by: INTERNAL MEDICINE

## 2022-11-21 ASSESSMENT — PATIENT HEALTH QUESTIONNAIRE - PHQ9
SUM OF ALL RESPONSES TO PHQ9 QUESTIONS 1 & 2: 0
SUM OF ALL RESPONSES TO PHQ QUESTIONS 1-9: 0
SUM OF ALL RESPONSES TO PHQ QUESTIONS 1-9: 0
1. LITTLE INTEREST OR PLEASURE IN DOING THINGS: 0
2. FEELING DOWN, DEPRESSED OR HOPELESS: 0
SUM OF ALL RESPONSES TO PHQ QUESTIONS 1-9: 0
SUM OF ALL RESPONSES TO PHQ QUESTIONS 1-9: 0

## 2022-11-21 NOTE — PROGRESS NOTES
Chief Complaint   Patient presents with    Hypotension       Assessment/Plan:  Masoud Edwards was seen today for hypotension. Diagnoses and all orders for this visit:    Parkinson's disease ([address]  Comments:  Chronic, stable. On Sinemet    Essential hypertension  Comments:  Chronic, controlled    Obesity (BMI 30.0-34. 9)  Comments:  He had some weight after Covid-19    Primary osteoarthritis of right knee  Comments:  Tylenol and ice. Stationary bicycle    PAF (paroxysmal atrial fibrillation) ([address]  Comments:  He is on Xarelto      Vitals:    11/21/22 1340   BP: 122/84   Pulse: 68   Temp: 97 °F (36.1 °C)   SpO2: 97%     Physical Exam  Vitals reviewed. Constitutional:       General: He is not in acute distress. Appearance: He is well-developed. He is not diaphoretic. HENT:      Head: Normocephalic and atraumatic. Cardiovascular:      Rate and Rhythm: Normal rate and regular rhythm. Pulses: Normal pulses. Heart sounds: Normal heart sounds. No murmur heard. No friction rub. No gallop. Pulmonary:      Effort: Pulmonary effort is normal. No respiratory distress. Breath sounds: Normal breath sounds. No stridor. No wheezing, rhonchi or rales. Chest:      Chest wall: No tenderness. Neurological:      Mental Status: He is alert and oriented to person, place, and time. Cranial Nerves: No cranial nerve deficit. Psychiatric:         Behavior: Behavior normal.         Thought Content:  Thought content normal.         Judgment: Judgment normal.       PHQ Scores 11/21/2022 8/29/2022 12/20/2021 12/15/2021 6/18/2021 12/18/2020 6/17/2020   PHQ2 Score 0 0 0 0 0 0 0   PHQ9 Score 0 9 0 0 0 0 0     Interpretation of Total Score Depression Severity: 1-4 = Minimal depression, 5-9 = Mild depression, 10-14 = Moderate depression, 15-19 = Moderately severe depression, 20-27 = Severe depression    MD Geetha Bashir

## 2022-11-22 ENCOUNTER — TELEPHONE (OUTPATIENT)
Dept: CARDIOLOGY CLINIC | Age: 62
End: 2022-11-22

## 2022-11-22 NOTE — TELEPHONE ENCOUNTER
Call placed to Elvis Garcia.  Discussed the results of his monitor and scheduled him for 12/13 with LOEV

## 2022-11-30 ENCOUNTER — TELEPHONE (OUTPATIENT)
Dept: CARDIOLOGY CLINIC | Age: 62
End: 2022-11-30

## 2022-11-30 NOTE — TELEPHONE ENCOUNTER
Jenn called to request the Pt medical records for the denial of the review for the appeal.   Will be sending the release for medical records.   Please advise

## 2022-11-30 NOTE — TELEPHONE ENCOUNTER
Sample requested:   rivaroxaban (XARELTO) 20 MG TABS tablet     Strength:     Dosage: 1 a day    Patient's call back number: [phone]

## 2022-11-30 NOTE — TELEPHONE ENCOUNTER
Spoke to Adia cintron at [address] [facility] and faxed over pt's last 2 office visit notes. Adia cintron will call office back if anything else is needed.

## 2022-12-02 NOTE — TELEPHONE ENCOUNTER
He should be able to get an emergency supply from his pharmacy. If he has not looked into financial assistance for Xarelto, then he should see what the cost of alternatives are. May need to consider coumadin if DOACs are cost prohibitive.     Lisa Mena, EMILIA-CNP

## 2022-12-02 NOTE — TELEPHONE ENCOUNTER
Called and spoke to pt. He states it cost $130 for a 30 day supply. He has rx at the pharmacy that he is going to . He has never been given a co-pay card. He will pick one up when he comes in for his upcoming appt.     EMILIA Hill-CNP

## 2022-12-02 NOTE — TELEPHONE ENCOUNTER
Spoke to pt and advised him of message below, pt wants nurse or provider to call and explain the difference between coumadin and xarelto and also what he needs to do to start it amongst other questions. Please call and advise.

## 2022-12-02 NOTE — TELEPHONE ENCOUNTER
Pt called back regarding samples. Was told we do not have any at this time. Pt said he took his last one today and to advise what to do. Unable to afford script.

## 2022-12-12 NOTE — PROGRESS NOTES
[address]   Electrophysiology Follow up   Date: 12/13/2022  I had the privilege of visiting Tiffanie Flowers in the office. CC: PAF  HPI: Tiffanie Flowers is a 60 y.o. male with a PMH of atrial fibrillation, KATELIN, HTN, and parkinson's disease. He also has history of prior atrial fibrillation/flutter ablation in late 90's at  by Dr. Mauricio Cano. 2/25/16, he had PVI and CAFE ablation with restoration of sinus rhythm with ablation. He returned with recurrent atrial fibrillation after being off Propafenone and was cardioverted on 4/6/16. He presented to [facility] ED on 9/2020 with complaints of sudden palpitations while watching TV. Admitted on 9/2020 with atrial fibrillation and RVR. He stated that his afib was worsening in the past few months prior to ablation. He had cardioversion on 8/2020 prior to admission. S/p RFCA with PVI, focal left sided atrial tachycardia, and roof line (1/20/21). Holter Monitor 04/09/2021 to 04/23/2021 Sinus rhythm, PAC burden 0.01%, no atrial fibrillation. 1st degree bloc. Max , Min HR 42, Average HR 63     Monitor 09/2022 -42% atrial fibrillation. He states Dr. Simba Cesarery  him off metopolol just before he had recurrent episodes due to episodes of heat syncope with bradycardia       Mortimer Matin presents today in follow up to monitor. He had an episode of heat syncope with bradycardia. this summer. He metoprolol was held. He developed recurrent atrial fibrillation. He resume his blood thinner. He does feel his episodes of atrial fibrillation   and he has episodes of SOB     Assessment and plan:     -PAF   Recurrent episodes. Holter with 40% burden. S/p RFCA with PVI and CFAE ablation (2/25/16)  S/p RFCA with PVI, focal left sided atrial tachycardia, and roof line (1/20/21). Today we discussed treatment options which include redo ablation, conversion method with ALFONSO clip versus antiarrhythmic therapy.    He states that his brother had maze procedure and he would like to have his left atrial appendage clip so he can stop anticoagulation if possible. Will refer him to Dr. Giles Amaral. He needs coronary evaluation if decision is made to proceed with surgery. Continue xarelto 20 mg        Lopressor was held 08/2022 by PCP for heat syncope and bradycardia    -HTN  controlled: Will improve with diet and exercise, will follow up with PCP if this remains elevated   BP goal <130/80  Home BP monitoring encouraged, printed information provided on how to accurately measure BP at home. Counseled to follow a low salt diet to assure blood pressure remains controlled for cardiovascular risk factor modification. The patient is counseled to get regular exercise 3-5 times per week and maintain a healthy weight reduce cardiovascular risk factors. - continue current medication        -Obesity: Body mass index is 31.25 kg/m². Excessive weight is complicating assessment and treatment. It is placing patient at risk for multiple co-morbidities as well as early death and contributing to the patient's presentation.    discussed weight management with diet and exercise    Discussed increased activity once weather is better and he can ride his bike        -KATELIN   Does not tolerate CPAP   Encourage to use machine to prevent long term effects of untreated KATELIN      Patient Active Problem List    Diagnosis Date Noted    Atrial tachycardia ([facility] [address]     Parkinson's disease ([facility] [address] 08/19/2020    Syncope and collapse 08/19/2020    Obesity (BMI 30.0-34.9) 02/05/2020    Moderate episode of recurrent major depressive disorder ([address] 09/11/2018    Adjustment disorder with anxious mood 09/05/2018    Disabling essential tremor 12/01/2017    PAF (paroxysmal atrial fibrillation) ([facility] [address] 02/25/2016    KATELIN (obstructive sleep apnea) 12/19/2014    Essential hypertension 11/16/2011     Diagnostic studies:   ECG 12/13/22  SR , QTcH 388 ,QRS 90       MASSIMO 1/20/2021   Summary   Normal left ventricular cavity size and wall thickness. Global left ventricular function is normal with ejection fraction estimated   from 50 % to 55 %. No regional wall motion abnormalities noted. There is no evidence of mass or thrombus in the left atrium or appendage. Limited echo 9/14/2020   Summary   -Limited exam per Dr. Star Monroe. Patient had a MASSIMO 5/2020.   -Normal left ventricle size, wall thickness, and systolic function with an   estimated ejection fraction of 55%.   -No regional wall motion abnormalities are seen.   -Trivial mitral regurgitation    I independently reviewed the cardiac diagnostic studies, ECG and relevant imaging studies. Lab Results   Component Value Date    LVEF 53 01/20/2021     Lab Results   Component Value Date    TSH 2.03 06/17/2020       Physical Examination:  Vitals:    12/13/22 1121   BP: 124/82   Pulse: 80   SpO2: 98%        Wt Readings from Last 3 Encounters:   12/13/22 230 lb 6.4 oz (104.5 kg)   11/21/22 233 lb (105.7 kg)   11/15/22 232 lb (105.2 kg)       Constitutional: Oriented. No distress. Head: Normocephalic and atraumatic. Mouth/Throat: Oropharynx is clear and moist.   Eyes: Conjunctivae normal. EOM are normal.   Neck: Neck supple. No JVD present. Cardiovascular: Normal rate, regular rhythm, S1&S2. Pulmonary/Chest: Bilateral respiratory sounds. No rhonchi. Abdominal: Soft. No tenderness. Musculoskeletal: No tenderness. No edema    Lymphadenopathy: Has no cervical adenopathy. Neurological: Alert and oriented. Follows command, No Gross deficit   Skin: Skin is warm, No rash noted. Psychiatric: Has a normal behavior       Review of System:  [x] Full ROS obtained and negative except as mentioned in HPI    Prior to Admission medications    Medication Sig Start Date End Date Taking?  Authorizing Provider   vitamin B-12 (CYANOCOBALAMIN) 1000 MCG tablet TAKE 1 TABLET BY MOUTH DAILY 12/2/22  Yes Historical Provider, MD   rivaroxaban (XARELTO) 20 MG TABS tablet Take 1 tablet by mouth daily (with breakfast) 9/29/22  Yes Elmer Momin MD   dilTIAZem (CARDIZEM CD) 180 MG extended release capsule TAKE 1 CAPSULE BY MOUTH DAILY 9/12/22  Yes EMILIA Hill CNP   fluticasone (FLONASE) 50 MCG/ACT nasal spray SHAKE LIQUID AND USE 1 SPRAY IN EACH NOSTRIL DAILY 1/31/22  Yes Rhona Lara MD   clonazePAM (KLONOPIN) 0.5 MG tablet nightly as needed. 3/15/21  Yes Historical Provider, MD   fexofenadine (ALLEGRA) 180 MG tablet Take 180 mg by mouth daily   Yes Historical Provider, MD   carbidopa-levodopa (SINEMET)  MG per tablet Take 3 tablets by mouth 4 times daily  Patient taking differently: Take 3 tablets by mouth Taking 3 tablets by mouth 5 times daily 2/6/20  Yes EMILIA Hill CNP       No Known Allergies    Social History:  Reviewed. reports that he quit smoking about 26 years ago. His smoking use included cigarettes. He has a 15.00 pack-year smoking history. He has never used smokeless tobacco. He reports that he does not currently use alcohol after a past usage of about 20.0 standard drinks per week. He reports current drug use. Drug: Marijuana [place]). Family History:  Reviewed. Reviewed. No family history of SCD. Relevant and available labs, and cardiovascular diagnostics reviewed. Reviewed. I independently reviewed relevant and available cardiac diagnostic tests ECG, CXR, Echo, Stress test, Device interrogation, Holter, CT scan. - The patient is counseled to follow a low salt diet to assure blood pressure remains controlled for cardiovascular risk factor modification.   - The patient is counseled to avoid excess caffeine, and energy drinks as this may exacerbated ectopy and arrhythmia. - The patient is counseled to get regular exercise 3-5 times per week to control cardiovascular risk factors. - The patient is counseled to lose weigt to control cardiovascular risk factors. - The patient is counseled to avoid tobacco use. All questions and concerns were addressed to the patient/family. Alternatives to my treatment were discussed. I have discussed the above stated plan and the patient verbalized understanding and agreed with the plan. Scribe attestation: This note was scribed in the presence of Britney Ríos MD by Dahlia Spring RN    Physician Attestation: I, Dr. Britney Ríos, confirm that the scribe's documentation has been prepared under my direction and personally reviewed by me in its entirety. I also confirm that the note above accurately reflects all work, treatment, procedures, and medical decision making performed by me. NOTE: This report was transcribed using voice recognition software. Every effort was made to ensure accuracy, however, inadvertent computerized transcription errors may be present.      Britney Ríos MD, MPH  [place] [address]   Office: [phone]  Fax: [phone]

## 2022-12-13 ENCOUNTER — OFFICE VISIT (OUTPATIENT)
Dept: CARDIOLOGY CLINIC | Age: 62
End: 2022-12-13
Payer: MEDICARE

## 2022-12-13 VITALS
BODY MASS INDEX: 31.21 KG/M2 | HEART RATE: 80 BPM | HEIGHT: 72 IN | SYSTOLIC BLOOD PRESSURE: 124 MMHG | DIASTOLIC BLOOD PRESSURE: 82 MMHG | OXYGEN SATURATION: 98 % | WEIGHT: 230.4 LBS

## 2022-12-13 DIAGNOSIS — G47.33 OSA (OBSTRUCTIVE SLEEP APNEA): Chronic | ICD-10-CM

## 2022-12-13 DIAGNOSIS — E66.9 OBESITY (BMI 30.0-34.9): Chronic | ICD-10-CM

## 2022-12-13 DIAGNOSIS — I10 ESSENTIAL HYPERTENSION: Chronic | ICD-10-CM

## 2022-12-13 DIAGNOSIS — I48.0 PAF (PAROXYSMAL ATRIAL FIBRILLATION) (HCC): Primary | Chronic | ICD-10-CM

## 2022-12-13 PROCEDURE — G8417 CALC BMI ABV UP PARAM F/U: HCPCS | Performed by: INTERNAL MEDICINE

## 2022-12-13 PROCEDURE — G8484 FLU IMMUNIZE NO ADMIN: HCPCS | Performed by: INTERNAL MEDICINE

## 2022-12-13 PROCEDURE — 99215 OFFICE O/P EST HI 40 MIN: CPT | Performed by: INTERNAL MEDICINE

## 2022-12-13 PROCEDURE — 1036F TOBACCO NON-USER: CPT | Performed by: INTERNAL MEDICINE

## 2022-12-13 PROCEDURE — G8427 DOCREV CUR MEDS BY ELIG CLIN: HCPCS | Performed by: INTERNAL MEDICINE

## 2022-12-13 PROCEDURE — 3078F DIAST BP <80 MM HG: CPT | Performed by: INTERNAL MEDICINE

## 2022-12-13 PROCEDURE — 3074F SYST BP LT 130 MM HG: CPT | Performed by: INTERNAL MEDICINE

## 2022-12-13 PROCEDURE — 3017F COLORECTAL CA SCREEN DOC REV: CPT | Performed by: INTERNAL MEDICINE

## 2022-12-13 PROCEDURE — 93000 ELECTROCARDIOGRAM COMPLETE: CPT | Performed by: INTERNAL MEDICINE

## 2022-12-13 RX ORDER — LANOLIN ALCOHOL/MO/W.PET/CERES
CREAM (GRAM) TOPICAL
COMMUNITY
Start: 2022-12-02

## 2022-12-29 ENCOUNTER — TELEPHONE (OUTPATIENT)
Dept: CARDIOLOGY CLINIC | Age: 62
End: 2022-12-29

## 2022-12-29 ENCOUNTER — OFFICE VISIT (OUTPATIENT)
Dept: CARDIOTHORACIC SURGERY | Age: 62
End: 2022-12-29
Payer: MEDICARE

## 2022-12-29 VITALS
TEMPERATURE: 98.2 F | HEART RATE: 78 BPM | SYSTOLIC BLOOD PRESSURE: 137 MMHG | WEIGHT: 229.8 LBS | BODY MASS INDEX: 31.13 KG/M2 | DIASTOLIC BLOOD PRESSURE: 75 MMHG | OXYGEN SATURATION: 97 % | HEIGHT: 72 IN

## 2022-12-29 DIAGNOSIS — Z01.818 PRE-OP TESTING: Primary | ICD-10-CM

## 2022-12-29 DIAGNOSIS — I48.0 PAF (PAROXYSMAL ATRIAL FIBRILLATION) (HCC): ICD-10-CM

## 2022-12-29 DIAGNOSIS — G47.33 OSA (OBSTRUCTIVE SLEEP APNEA): ICD-10-CM

## 2022-12-29 DIAGNOSIS — E66.9 OBESITY (BMI 30.0-34.9): Primary | ICD-10-CM

## 2022-12-29 DIAGNOSIS — R55 SYNCOPE AND COLLAPSE: ICD-10-CM

## 2022-12-29 DIAGNOSIS — G20 PARKINSON'S DISEASE (HCC): ICD-10-CM

## 2022-12-29 PROCEDURE — G8482 FLU IMMUNIZE ORDER/ADMIN: HCPCS | Performed by: STUDENT IN AN ORGANIZED HEALTH CARE EDUCATION/TRAINING PROGRAM

## 2022-12-29 PROCEDURE — 99205 OFFICE O/P NEW HI 60 MIN: CPT | Performed by: STUDENT IN AN ORGANIZED HEALTH CARE EDUCATION/TRAINING PROGRAM

## 2022-12-29 PROCEDURE — G8427 DOCREV CUR MEDS BY ELIG CLIN: HCPCS | Performed by: STUDENT IN AN ORGANIZED HEALTH CARE EDUCATION/TRAINING PROGRAM

## 2022-12-29 PROCEDURE — 1036F TOBACCO NON-USER: CPT | Performed by: STUDENT IN AN ORGANIZED HEALTH CARE EDUCATION/TRAINING PROGRAM

## 2022-12-29 PROCEDURE — G8417 CALC BMI ABV UP PARAM F/U: HCPCS | Performed by: STUDENT IN AN ORGANIZED HEALTH CARE EDUCATION/TRAINING PROGRAM

## 2022-12-29 PROCEDURE — 3074F SYST BP LT 130 MM HG: CPT | Performed by: STUDENT IN AN ORGANIZED HEALTH CARE EDUCATION/TRAINING PROGRAM

## 2022-12-29 PROCEDURE — 3078F DIAST BP <80 MM HG: CPT | Performed by: STUDENT IN AN ORGANIZED HEALTH CARE EDUCATION/TRAINING PROGRAM

## 2022-12-29 PROCEDURE — 3017F COLORECTAL CA SCREEN DOC REV: CPT | Performed by: STUDENT IN AN ORGANIZED HEALTH CARE EDUCATION/TRAINING PROGRAM

## 2022-12-29 NOTE — PROGRESS NOTES
Cardiac, Vascular and Thoracic Surgeons   New Patient Clinic Note     12/29/2022 11:53 AM  Surgeon:  Brendan An for :  Convergent    Chief complaint : afib, tiredness  Subjective:  Mr. Philly Smith pleasant man with early onset parkinsons but still functional. Long history of afib with an a flutter ablation in the 1990s at [facility]. In February 2016 he had PVI and CAFE with restoration of sinus rhythm. He was taken off propafenone and cardioverted in April 2016. He recurred with A. fib in 2020 and had a PVI and roofline in 2021. He was in sinus for most of 2021 however by September 2022 he had 42% A. fib burden. He is utilizing his CPAP machine more regularly. He is currently taking diltiazem and Xarelto. His brother had a transthoracic maze at [facility] recently and this made him interested in surgical afibrillation. Review of Systems  Review of Systems:  Constitutional:  + night sweats, headaches, weight loss. Eyes:  No glaucoma, cataracts. Wears glasses   ENMT:  No nosebleeds, deviated septum. Cardiac:  + A-fib, HTN  Vascular:  No claudication, varicosities. GI:  No PUD, heartburn. + constipation   :  No kidney stones, frequent UTIs  Musculoskeletal:  + arthritis, No gout. Respiratory:  +  SOB, No emphysema, asthma. Integumentary:  No dermatitis, itching, rash. Neurological:  No stroke, TIAs, seizures + parkinsons  Psychiatric:  + depression, anxiety. Endocrine: No diabetes, thyroid issues. Hematologic:  + bleeding, easy bruising d/t medications   Immunologic:  No known cancer, steroid therapies.     Past Medical History:        Diagnosis Date    Anxiety     Atrial fibrillation [facility])     Atrial flutter ([facility] [address]     Cervical arthritis 3/30/2012    CTS (carpal tunnel syndrome) 3/13/2013    Essential tremor 2017    Hypertension     KATELIN (obstructive sleep apnea)     DOES NOT USE C-PAP    Osteoarthritis of back 11/6/2014    Osteoarthritis of right hip 11/6/2014    Parkinson disease ([facility] [address]     Parkinson's disease ([facility] [address] 8/19/2020    S/P ablation of atrial fibrillation 2/25/2016    Radiofrequency ablation of atrial fibrillation and pulmonary veins isolation Additional ablation of complex atrial fractionated electrogram     Sinusitis     Trigger finger 12/10/2014       Past Surgical History:        Procedure Laterality Date    ANKLE SURGERY  2015    ANKLE SURGERY Left     ATRIAL [address]    ATRIAL ABLATION SURGERY  2/25/16    RFCA of AF and PVI, additional ablation complex atrial fractioned electrogram    BACK SURGERY  1984    lumbar patino    CARDIOVERSION  4/6/16    CARPAL TUNNEL RELEASE Right 11/19/2013    RIGHT CARPAL TUNNEL RELEASE      CARPAL TUNNEL RELEASE Left 12-10-13     CARPAL TUNNEL RELEASE                COLONOSCOPY  10/29/2007    COLONOSCOPY N/A 7/30/2020    COLONOSCOPY POLYPECTOMY [facility]ARE[facility]LASTY Right 2016    right hip replacement       Allergies:  Patient has no known allergies. Social History:    TOBACCO:   reports that he quit smoking about 26 years ago. His smoking use included cigarettes. He has a 15.00 pack-year smoking history. He has never used smokeless tobacco.  ETOH:   reports that he does not currently use alcohol after a past usage of about 20.0 standard drinks per week. DRUGS:   reports current drug use. Drug: Marijuana Padmini Lepe).       Family History:        Problem Relation Age of Onset    High Blood Pressure Mother     High Cholesterol Mother     Alcohol Abuse Father     Other Other         afib    Heart Disease Neg Hx     Mental Illness Neg Hx          Vital Signs: /75 (Site: Left Upper Arm, Position: Sitting)   Pulse 78   Temp 98.2 °F (36.8 °C) (Infrared)   Ht 6' (1.829 m)   Wt 229 lb 12.8 oz (104.2 kg)   SpO2 97%   BMI 31.17 kg/m²      I/O:  No intake or output data in the 24 hours ending 12/29/22 1153    Exam:   Physical Exam  Vitals and nursing note reviewed. Constitutional:       General: He is not in acute distress. Appearance: Normal appearance. He is not toxic-appearing. HENT:      Head: Normocephalic. Eyes:      Extraocular Movements: Extraocular movements intact. Pupils: Pupils are equal, round, and reactive to light. Neck:      Vascular: No carotid bruit. Cardiovascular:      Rate and Rhythm: Normal rate and regular rhythm. Pulses: Normal pulses. Carotid pulses are 2+ on the right side and 2+ on the left side. Radial pulses are 2+ on the right side and 2+ on the left side. Femoral pulses are 2+ on the right side and 2+ on the left side. Dorsalis pedis pulses are 2+ on the right side and 2+ on the left side. Posterior tibial pulses are 2+ on the right side and 2+ on the left side. Heart sounds: No murmur heard. Pulmonary:      Effort: Pulmonary effort is normal. No respiratory distress. Breath sounds: Normal breath sounds. Abdominal:      General: Abdomen is flat. There is no distension. Palpations: Abdomen is soft. Tenderness: There is no abdominal tenderness. Musculoskeletal:         General: No swelling or tenderness. Right lower leg: No edema. Left lower leg: No edema. Lymphadenopathy:      Cervical: No cervical adenopathy. Skin:     General: Skin is warm and dry. Capillary Refill: Capillary refill takes 2 to 3 seconds. Findings: No erythema or rash. Neurological:      General: No focal deficit present. Mental Status: He is alert and oriented to person, place, and time. Mental status is at baseline. Motor: Tremor present. Psychiatric:         Mood and Affect: Mood normal.         Behavior: Behavior normal.         Thought Content:  Thought content normal.         Judgment: Judgment normal.         Chest X-Ray:  normal     Labs:   CBC: No results for input(s): WBC, HGB, HCT, MCV, PLT in the last 72 hours. BMP: No results for input(s): NA, K, CL, CO2, PHOS, BUN, CREATININE, CA in the last 72 hours. PT/INR: No results for input(s): PROTIME, INR in the last 72 hours. APTT: No results for input(s): APTT in the last 72 hours. No results found for this or any previous visit from the past 90 days. Scheduled Meds:     Patient Active Problem List   Diagnosis    Essential hypertension    KATELIN (obstructive sleep apnea)    PAF (paroxysmal atrial fibrillation) ([facility])    Disabling essential tremor    Adjustment disorder with anxious mood    Moderate episode of recurrent major depressive disorder ([facility])    Obesity (BMI 30.0-34. 9)    Parkinson's disease ([address]    Syncope and collapse    Atrial tachycardia ([facility])       Assessment/Plan:   Paroxysmal atrial fibrillation. He has had 2 PVI's as well as multiple ablations. Is a 40% A. fib burden. I think it is reasonable to offer him surgical A. fib management. Prior to this I would repeat a CT chest to evaluate his intrathoracic anatomy as well as pulmonary function testing to evaluate his candidacy for single lung ventilation for the left VATS clip portion. In addition I would order a left heart cath although I understand that he has a stress test ordered I would prefer a left heart cath. He has already scheduled a second opinion with Dr. Reuben Chand at [facility] in February but I would not let this preclude any preop testing. He can see me back in 3 weeks.         Lianna Yi MD

## 2022-12-29 NOTE — TELEPHONE ENCOUNTER
Please arrange cath with interventional cardiology. Order placed. Ok to hold Xarelto 2 days prior to cath.      Suyapa Seay, APRN-CNP

## 2022-12-29 NOTE — TELEPHONE ENCOUNTER
Dr. Alie Napoles is wanting [facility] to schedule a Left hard Cath, angiogram on the Pt. Please call him at:  [phone].   Please advise

## 2023-01-03 ENCOUNTER — TELEPHONE (OUTPATIENT)
Dept: CARDIOTHORACIC SURGERY | Age: 63
End: 2023-01-03

## 2023-01-03 NOTE — TELEPHONE ENCOUNTER
Spoke with patient regarding schedule of CT chest without contrast and pulmonary function testing on 1/17/2023 at 3:00 pm with arrival time of 2:30 pm for PFT's with CT chest to follow at 4:30 pm at [facility]. Patient has been instructed not to eat or drink 2 hours prior to PFTs, No caffeine after midnight, No inhaler usage or smoking 4 hours prior and to wear lose fitted clothing. Patient is scheduled for a left heart catheterization on 1/12/2023 at 8:00 am at [facility] with Dr. Tamie Hayes with instructions to be relayed by his office.

## 2023-01-12 ENCOUNTER — HOSPITAL ENCOUNTER (OUTPATIENT)
Dept: CARDIAC CATH/INVASIVE PROCEDURES | Age: 63
Discharge: HOME | End: 2023-01-12
Attending: INTERNAL MEDICINE | Admitting: INTERNAL MEDICINE
Payer: MEDICARE

## 2023-01-12 VITALS
WEIGHT: 229 LBS | SYSTOLIC BLOOD PRESSURE: 132 MMHG | RESPIRATION RATE: 18 BRPM | BODY MASS INDEX: 31.02 KG/M2 | TEMPERATURE: 98.1 F | DIASTOLIC BLOOD PRESSURE: 82 MMHG | HEIGHT: 72 IN | OXYGEN SATURATION: 99 % | HEART RATE: 101 BPM

## 2023-01-12 DIAGNOSIS — G20 PARKINSONIAN TREMOR (HCC): ICD-10-CM

## 2023-01-12 LAB
ANION GAP SERPL CALCULATED.3IONS-SCNC: 11 MMOL/L (ref 3–16)
BUN BLDV-MCNC: 13 MG/DL (ref 7–20)
CALCIUM SERPL-MCNC: 9.7 MG/DL (ref 8.3–10.6)
CHLORIDE BLD-SCNC: 104 MMOL/L (ref 99–110)
CO2: 26 MMOL/L (ref 21–32)
CREAT SERPL-MCNC: 0.7 MG/DL (ref 0.8–1.3)
EKG ATRIAL RATE: 312 BPM
EKG DIAGNOSIS: NORMAL
EKG Q-T INTERVAL: 326 MS
EKG QRS DURATION: 78 MS
EKG QTC CALCULATION (BAZETT): 428 MS
EKG R AXIS: -13 DEGREES
EKG T AXIS: 31 DEGREES
EKG VENTRICULAR RATE: 104 BPM
GFR SERPL CREATININE-BSD FRML MDRD: >60 ML/MIN/{1.73_M2}
GLUCOSE BLD-MCNC: 96 MG/DL (ref 70–99)
HCT VFR BLD CALC: 51.3 % (ref 40.5–52.5)
HEMOGLOBIN: 17.2 G/DL (ref 13.5–17.5)
LEFT VENTRICULAR EJECTION FRACTION MODE: NORMAL
LV EF: 60 %
MCH RBC QN AUTO: 31.2 PG (ref 26–34)
MCHC RBC AUTO-ENTMCNC: 33.6 G/DL (ref 31–36)
MCV RBC AUTO: 93.1 FL (ref 80–100)
PDW BLD-RTO: 14.4 % (ref 12.4–15.4)
PLATELET # BLD: 193 K/UL (ref 135–450)
PMV BLD AUTO: 10 FL (ref 5–10.5)
POTASSIUM SERPL-SCNC: 5.1 MMOL/L (ref 3.5–5.1)
RBC # BLD: 5.5 M/UL (ref 4.2–5.9)
SODIUM BLD-SCNC: 141 MMOL/L (ref 136–145)
WBC # BLD: 6.5 K/UL (ref 4–11)

## 2023-01-12 PROCEDURE — 93010 ELECTROCARDIOGRAM REPORT: CPT | Performed by: INTERNAL MEDICINE

## 2023-01-12 PROCEDURE — 85027 COMPLETE CBC AUTOMATED: CPT

## 2023-01-12 PROCEDURE — 6360000002 HC RX W HCPCS

## 2023-01-12 PROCEDURE — 99153 MOD SED SAME PHYS/QHP EA: CPT

## 2023-01-12 PROCEDURE — C1894 INTRO/SHEATH, NON-LASER: HCPCS

## 2023-01-12 PROCEDURE — 36415 COLL VENOUS BLD VENIPUNCTURE: CPT

## 2023-01-12 PROCEDURE — 93458 L HRT ARTERY/VENTRICLE ANGIO: CPT | Performed by: INTERNAL MEDICINE

## 2023-01-12 PROCEDURE — 93458 L HRT ARTERY/VENTRICLE ANGIO: CPT

## 2023-01-12 PROCEDURE — 80048 BASIC METABOLIC PNL TOTAL CA: CPT

## 2023-01-12 PROCEDURE — C1769 GUIDE WIRE: HCPCS

## 2023-01-12 PROCEDURE — 93005 ELECTROCARDIOGRAM TRACING: CPT | Performed by: INTERNAL MEDICINE

## 2023-01-12 PROCEDURE — 2500000003 HC RX 250 WO HCPCS

## 2023-01-12 PROCEDURE — 2709999900 HC NON-CHARGEABLE SUPPLY

## 2023-01-12 PROCEDURE — 99152 MOD SED SAME PHYS/QHP 5/>YRS: CPT | Performed by: INTERNAL MEDICINE

## 2023-01-12 PROCEDURE — 99152 MOD SED SAME PHYS/QHP 5/>YRS: CPT

## 2023-01-12 PROCEDURE — 6360000004 HC RX CONTRAST MEDICATION: Performed by: INTERNAL MEDICINE

## 2023-01-12 RX ORDER — SODIUM CHLORIDE 0.9 % (FLUSH) 0.9 %
5-40 SYRINGE (ML) INJECTION PRN
Status: DISCONTINUED | OUTPATIENT
Start: 2023-01-12 | End: 2023-01-12 | Stop reason: HOSPADM

## 2023-01-12 RX ORDER — ACETAMINOPHEN 325 MG/1
650 TABLET ORAL EVERY 4 HOURS PRN
Status: DISCONTINUED | OUTPATIENT
Start: 2023-01-12 | End: 2023-01-12 | Stop reason: HOSPADM

## 2023-01-12 RX ORDER — SODIUM CHLORIDE 9 MG/ML
INJECTION, SOLUTION INTRAVENOUS CONTINUOUS
Status: DISCONTINUED | OUTPATIENT
Start: 2023-01-12 | End: 2023-01-12 | Stop reason: HOSPADM

## 2023-01-12 RX ORDER — ONDANSETRON 2 MG/ML
4 INJECTION INTRAMUSCULAR; INTRAVENOUS EVERY 6 HOURS PRN
Status: DISCONTINUED | OUTPATIENT
Start: 2023-01-12 | End: 2023-01-12 | Stop reason: HOSPADM

## 2023-01-12 RX ORDER — SODIUM CHLORIDE 0.9 % (FLUSH) 0.9 %
5-40 SYRINGE (ML) INJECTION EVERY 12 HOURS SCHEDULED
Status: DISCONTINUED | OUTPATIENT
Start: 2023-01-12 | End: 2023-01-12 | Stop reason: HOSPADM

## 2023-01-12 RX ORDER — SODIUM CHLORIDE 9 MG/ML
INJECTION, SOLUTION INTRAVENOUS PRN
Status: DISCONTINUED | OUTPATIENT
Start: 2023-01-12 | End: 2023-01-12 | Stop reason: HOSPADM

## 2023-01-12 RX ADMIN — IOPAMIDOL 80 ML: 755 INJECTION, SOLUTION INTRAVENOUS at 08:41

## 2023-01-12 NOTE — DISCHARGE INSTRUCTIONS
Radial Angiogram      Care of your puncture site:  Remove clear bandage 24 hours after the procedure. May shower in 24 hours  Inspect the site daily and gently clean using soap and water. Dry thoroughly and apply a Band-Aid. Normal Observations:  Soreness or tenderness which may last one week. Mild oozing from the incision site. Possible bruising that could last 2 weeks. Activity:  You may resume driving 24 hours following the procedure. You may resume normal activity in 3 days or after the wound heals. Avoid lifting more than 10 pounds for 3 days with affected arm. Nutrition:  Regular diet  Drink at least 8 to 10 glasses of decaffeinated, non-alcoholic fluid for the next 24 hours to flush the x-ray dye used for your angiogram out of your body. Call your doctor immediately if your condition worsens, for any other concerns, for a follow-up appointment or if you experience any of the following:  Significant bleeding that does not stop after 10 minutes of applying firm pressure on the puncture site. Increased swelling of the wrist.  Unusual pain, numbness, or tingling of the wrist/arm. Any signs of infection such as: redness, yellow drainage at the site, swelling or pain.

## 2023-01-12 NOTE — PRE SEDATION
Brief Pre-Op Note/Sedation Assessment      Daphne Rivera  1960  [id number]  8:08 AM    Planned Procedure: Cardiac Catheterization Procedure  Post Procedure Plan: Return to same level of care  Consent: I have discussed with the patient and/or the patient representative the indication, alternatives, and the possible risks and/or complications of the planned procedure and the anesthesia methods. The patient and/or patient representative appear to understand and agree to proceed. Chief Complaint:   Pre-Op Clearance      Indications for Cath Procedure:  Presentation:  Suspected CAD and Cardiac Arrythmia  2. Anginal Classification within 2 weeks:  No symptoms  3. Angina Symptoms Assessment:  Asymptomatic  4. Heart Failure Class within last 2 weeks:  No symptoms  5. Cardiovascular Instability:  No    Prior Ischemic Workup/Eval:  Pre-Procedural Medications: Yes: Beta Blockers  2. Stress Test Completed? No    Does Patient need surgery?   Cath Valve Surgery:  No    Pre-Procedure Medical History:  Vital Signs:  /82   Pulse (!) 101   Temp 98.1 °F (36.7 °C) (Infrared)   Resp 18   Ht 6' (1.829 m)   Wt 229 lb (103.9 kg)   SpO2 99%   BMI 31.06 kg/m²     Allergies:  No Known Allergies  Medications:    Current Facility-Administered Medications   Medication Dose Route Frequency Provider Last Rate Last Admin    sodium chloride flush 0.9 % injection 5-40 mL  5-40 mL IntraVENous PRN Kiley Anderson MD           Past Medical History:    Past Medical History:   Diagnosis Date    Anxiety     Atrial fibrillation [facility])     Atrial flutter ([facility])     Cervical arthritis 3/30/2012    CTS (carpal tunnel syndrome) 3/13/2013    Essential tremor 2017    Hypertension     KATELIN (obstructive sleep apnea)     DOES NOT USE C-PAP    Osteoarthritis of back 11/6/2014    Osteoarthritis of right hip 11/6/2014    Parkinson disease ([facility] [address]     Parkinson's disease ([facility] [address] 8/19/2020    S/P ablation of atrial fibrillation 2/25/2016 Radiofrequency ablation of atrial fibrillation and pulmonary veins isolation Additional ablation of complex atrial fractionated electrogram     Sinusitis     Trigger finger 12/10/2014       Surgical History:    Past Surgical History:   Procedure Laterality Date    ANKLE SURGERY  2015    ANKLE SURGERY Left     ATRIAL ABLATION SURGERY  1998    ATRIAL ABLATION SURGERY  2/25/16    RFCA of AF and PVI, additional ablation complex atrial fractioned electrogram    BACK SURGERY  1984    lumbar patino    CARDIOVERSION  4/6/16    CARPAL TUNNEL RELEASE Right 11/19/2013    RIGHT CARPAL TUNNEL RELEASE      CARPAL TUNNEL RELEASE Left 12-10-13     CARPAL TUNNEL RELEASE                COLONOSCOPY  10/29/2007    COLONOSCOPY N/A 7/30/2020    COLONOSCOPY POLYPECTOMY SNARE/COLD BIOPSY performed by Janette Yin MD at [address] [place] [address]      TOE SURGERY      TOTAL HIP ARTHROPLASTY Right 2016    right hip replacement             Pre-Sedation:  Pre-Sedation Documentation and Exam:  I have personally completed a history, physical exam & review of systems for this patient (see notes). Prior History of Anesthesia Complications:   none    Modified Mallampati:  II (soft palate, uvula, fauces visible)    ASA Classification:  Class 2 - A normal healthy patient with mild systemic disease    Mak Scale: Activity:  2 - Able to move 4 extremities voluntarily on command  Respiration:  2 - Able to breathe deeply and cough freely  Circulation:  2 - BP+/- 20mmHg of normal  Consciousness:  2 - Fully awake  Oxygen Saturation (color):  2 - Able to maintain oxygen saturation >92% on room air    Sedation/Anesthesia Plan:  Guard the patient's safety and welfare. Minimize physical discomfort and pain. Minimize negative psychological responses to treatment by providing sedation and analgesia and maximize the potential amnesia. Patient to meet pre-procedure discharge plan.     Medication Planned:  midazolam intravenously and fentanyl intravenously    Patient is an appropriate candidate for plan of sedation:   yes      Electronically signed by Kirill Nolen MD on 1/12/2023 at 8:08 AM

## 2023-01-12 NOTE — H&P
[address]   Electrophysiology Follow up   Date: 1/12/2023  I had the privilege of visiting Judith Vallejo in the office. CC: PAF  HPI: Judith Vallejo is a 60 y.o. male with a PMH of atrial fibrillation, KATELIN, HTN, and parkinson's disease. He also has history of prior atrial fibrillation/flutter ablation in late 90's at  by Dr. Dawit Rowley. 2/25/16, he had PVI and CAFE ablation with restoration of sinus rhythm with ablation. He returned with recurrent atrial fibrillation after being off Propafenone and was cardioverted on 4/6/16. He presented to [facility] ED on 9/2020 with complaints of sudden palpitations while watching TV. Admitted on 9/2020 with atrial fibrillation and RVR. He stated that his afib was worsening in the past few months prior to ablation. He had cardioversion on 8/2020 prior to admission. S/p RFCA with PVI, focal left sided atrial tachycardia, and roof line (1/20/21). Holter Monitor 04/09/2021 to 04/23/2021 Sinus rhythm, PAC burden 0.01%, no atrial fibrillation. 1st degree bloc. Max , Min HR 42, Average HR 63     Monitor 09/2022 -42% atrial fibrillation. He states Dr. Perez Avalos  him off metopolol just before he had recurrent episodes due to episodes of heat syncope with bradycardia       Yaz Godfrey presents today in follow up to monitor. He had an episode of heat syncope with bradycardia. this summer. He metoprolol was held. He developed recurrent atrial fibrillation. He resume his blood thinner. He does feel his episodes of atrial fibrillation   and he has episodes of SOB   He has PAF and was seen for convergent/ALFONSO clip with Dr. Stacie Chun and plan:     -PAF   Recurrent episodes. Holter with 40% burden. S/p RFCA with PVI and CFAE ablation (2/25/16)  S/p RFCA with PVI, focal left sided atrial tachycardia, and roof line (1/20/21).        Today we discussed treatment options which include redo ablation, conversion method with ALFONSO clip versus antiarrhythmic therapy. He states that his brother had maze procedure and he would like to have his left atrial appendage clip so he can stop anticoagulation if possible. Will refer him to Dr. Yessica Khan. He needs coronary evaluation if decision is made to proceed with surgery. Continue xarelto 20 mg        Lopressor was held 08/2022 by PCP for heat syncope and bradycardia    -HTN  controlled: Will improve with diet and exercise, will follow up with PCP if this remains elevated   BP goal <130/80  Home BP monitoring encouraged, printed information provided on how to accurately measure BP at home. Counseled to follow a low salt diet to assure blood pressure remains controlled for cardiovascular risk factor modification. The patient is counseled to get regular exercise 3-5 times per week and maintain a healthy weight reduce cardiovascular risk factors. - continue current medication        -Obesity: Body mass index is 31.06 kg/m². Excessive weight is complicating assessment and treatment. It is placing patient at risk for multiple co-morbidities as well as early death and contributing to the patient's presentation.    discussed weight management with diet and exercise    Discussed increased activity once weather is better and he can ride his bike        -KATELIN   Does not tolerate CPAP   Encourage to use machine to prevent long term effects of untreated KATELIN      Patient Active Problem List    Diagnosis Date Noted    Atrial tachycardia ([address]     Parkinson's disease ([address] 08/19/2020    Syncope and collapse 08/19/2020    Obesity (BMI 30.0-34.9) 02/05/2020    Moderate episode of recurrent major depressive disorder ([address] 09/11/2018    Adjustment disorder with anxious mood 09/05/2018    Disabling essential tremor 12/01/2017    PAF (paroxysmal atrial fibrillation) ([address] 02/25/2016    KATELIN (obstructive sleep apnea) 12/19/2014    Essential hypertension 11/16/2011     Diagnostic studies:   ECG 12/13/22  SR , QTcH 388 ,QRS 90       MASSIMO 1/20/2021   Summary   Normal left ventricular cavity size and wall thickness. Global left ventricular function is normal with ejection fraction estimated   from 50 % to 55 %. No regional wall motion abnormalities noted. There is no evidence of mass or thrombus in the left atrium or appendage. Limited echo 9/14/2020   Summary   -Limited exam per Dr. Star Monroe. Patient had a MASSIMO 5/2020.   -Normal left ventricle size, wall thickness, and systolic function with an   estimated ejection fraction of 55%.   -No regional wall motion abnormalities are seen.   -Trivial mitral regurgitation    I independently reviewed the cardiac diagnostic studies, ECG and relevant imaging studies. Lab Results   Component Value Date    LVEF 53 01/20/2021     Lab Results   Component Value Date    TSH 2.03 06/17/2020       Physical Examination:  Vitals:    01/12/23 0716   BP: 132/82   Pulse: (!) 101   Resp: 18   Temp: 98.1 °F (36.7 °C)   SpO2: 99%        Wt Readings from Last 3 Encounters:   01/12/23 229 lb (103.9 kg)   12/29/22 229 lb 12.8 oz (104.2 kg)   12/13/22 230 lb 6.4 oz (104.5 kg)       Constitutional: Oriented. No distress. Head: Normocephalic and atraumatic. Mouth/Throat: Oropharynx is clear and moist.   Eyes: Conjunctivae normal. EOM are normal.   Neck: Neck supple. No JVD present. Cardiovascular: Normal rate, regular rhythm, S1&S2. Pulmonary/Chest: Bilateral respiratory sounds. No rhonchi. Abdominal: Soft. No tenderness. Musculoskeletal: No tenderness. No edema    Lymphadenopathy: Has no cervical adenopathy. Neurological: Alert and oriented. Follows command, No Gross deficit   Skin: Skin is warm, No rash noted. Psychiatric: Has a normal behavior       Review of System:  [x] Full ROS obtained and negative except as mentioned in HPI    Prior to Admission medications    Medication Sig Start Date End Date Taking?  Authorizing Provider   vitamin B-12 (CYANOCOBALAMIN) 1000 MCG tablet TAKE 1 TABLET BY MOUTH DAILY  Patient not taking: Reported on 1/12/2023 12/2/22   Historical Provider, MD   rivaroxaban (XARELTO) 20 MG TABS tablet Take 1 tablet by mouth daily (with breakfast) 12/13/22   Ca Baig MD   dilTIAZem (CARDIZEM CD) 180 MG extended release capsule TAKE 1 CAPSULE BY MOUTH DAILY 9/12/22   EMILIA Odonnell - CNP   fluticasone (FLONASE) 50 MCG/ACT nasal spray SHAKE LIQUID AND USE 1 SPRAY IN EACH NOSTRIL DAILY  Patient not taking: Reported on 1/12/2023 1/31/22   Shaw Chand MD   clonazePAM (KLONOPIN) 0.5 MG tablet nightly as needed. 3/15/21   Historical Provider, MD   fexofenadine (ALLEGRA) 180 MG tablet Take 180 mg by mouth daily  Patient not taking: Reported on 1/12/2023    Historical Provider, MD   carbidopa-levodopa (SINEMET)  MG per tablet Take 3 tablets by mouth 4 times daily  Patient taking differently: Take 3 tablets by mouth Taking 3 tablets by mouth 5 times daily 2/6/20   EMILIA Odonnell CNP       No Known Allergies    Social History:  Reviewed. reports that he quit smoking about 26 years ago. His smoking use included cigarettes. He has a 15.00 pack-year smoking history. He has never used smokeless tobacco. He reports that he does not currently use alcohol after a past usage of about 20.0 standard drinks per week. He reports current drug use. Drug: Marijuana Alfornia Cashing). Family History:  Reviewed. Reviewed. No family history of SCD. Relevant and available labs, and cardiovascular diagnostics reviewed. Reviewed. I independently reviewed relevant and available cardiac diagnostic tests ECG, CXR, Echo, Stress test, Device interrogation, Holter, CT scan. - The patient is counseled to follow a low salt diet to assure blood pressure remains controlled for cardiovascular risk factor modification.   - The patient is counseled to avoid excess caffeine, and energy drinks as this may exacerbated ectopy and arrhythmia.    - The patient is counseled to get regular exercise 3-5 times per week to control cardiovascular risk factors. - The patient is counseled to lose weigt to control cardiovascular risk factors. - The patient is counseled to avoid tobacco use. All questions and concerns were addressed to the patient/family. Alternatives to my treatment were discussed. I have discussed the above stated plan and the patient verbalized understanding and agreed with the plan. Scribe attestation: This note was scribed in the presence of Porsche Matos MD by Isabelle Valdez RN    Physician Attestation: I, Dr. Porsche Matos, confirm that the scribe's documentation has been prepared under my direction and personally reviewed by me in its entirety. I also confirm that the note above accurately reflects all work, treatment, procedures, and medical decision making performed by me. NOTE: This report was transcribed using voice recognition software. Every effort was made to ensure accuracy, however, inadvertent computerized transcription errors may be present. Cristino Boyce MD   [address]   Office: [phone]  Fax: [phone]      I have reviewed the history and physical and examined the patient and find no relevant changes in the history and physical exam, including heart and lung sounds  I have reviewed with the patient and/or family the risks, benefits, and alternatives to the procedure. Based on these findings I recommend left heart cath for definitive evaluation of coronary arteries. Risks, benefits, expectations, and alternative treatments were discussed. Questions appropriately answered. Yola Anglin agrees to proceed and verbalized understanding.        Cristino Boyce MD 1/12/2023 8:12 AM

## 2023-01-12 NOTE — OP NOTE
Operative Note  Patient:  Taz Love   :   1960    Procedural Summary  ~Consent:   Obtained written and verbal consent      Risks/benefits explained in detail  ~Procedure:    Left Heart Catheterization  ~Medications:    Procedural sedation with minimal conscious sedation  ~Complications:   None  ~Blood Loss:    <10cc  ~Specimens:    None obtained  ~Pre-sedation re-evaluation: Performed immediately prior to procedure. Medication and Procedural Reconciliation:  An independent trained observer pushed medications at my direction. We monitored the patient's level of consciousness and vital signs/physiologic status throughout the procedure duration (see start and stop times below). Sedation: 4 mg Versed, 100 mcg Fentanyl  Sedation start: 812  Sedation stop: 835    Cardiac Cath PCI:  Anatomy:   LM-nml   LAD-prox 40%  Cx-nml  OM- nml  RCA-dom prox 20%  RPDA- nml  LVEF- 60%  LVG- nml  LVEDP- 5      Contrast: 80  Flouro Time: 2.2  Access: R radial a    Impression  ~Coronary Angiography w/ nonobstructive CAD  ~LVG with LVEF of 60 and no regional wall motion abnormalities      Recommendation  ~Aggressive medical treatment and risk factor modification  ~fu with CT surgery.             Kendra Tavera MD, MD 2023 8:39 AM

## 2023-01-17 ENCOUNTER — HOSPITAL ENCOUNTER (OUTPATIENT)
Dept: CT IMAGING | Age: 63
Discharge: HOME | End: 2023-01-17
Payer: MEDICARE

## 2023-01-17 ENCOUNTER — HOSPITAL ENCOUNTER (OUTPATIENT)
Dept: PULMONOLOGY | Age: 63
Discharge: HOME | End: 2023-01-17
Payer: MEDICARE

## 2023-01-17 VITALS — OXYGEN SATURATION: 96 % | HEART RATE: 70 BPM | RESPIRATION RATE: 16 BRPM

## 2023-01-17 DIAGNOSIS — Z01.818 PRE-OP TESTING: ICD-10-CM

## 2023-01-17 LAB
DLCO %PRED: 135 %
DLCO PRED: NORMAL
DLCO/VA %PRED: NORMAL
DLCO/VA PRED: NORMAL
DLCO/VA: NORMAL
DLCO: NORMAL
EXPIRATORY TIME-POST: NORMAL
EXPIRATORY TIME: NORMAL
FEF 25-75% %CHNG: NORMAL
FEF 25-75% %PRED-POST: NORMAL
FEF 25-75% %PRED-PRE: NORMAL
FEF 25-75% PRED: NORMAL
FEF 25-75%-POST: NORMAL
FEF 25-75%-PRE: NORMAL
FEV1 %PRED-POST: 80 %
FEV1 %PRED-PRE: 78 %
FEV1 PRED: NORMAL
FEV1-POST: NORMAL
FEV1-PRE: NORMAL
FEV1/FVC %PRED-POST: NORMAL
FEV1/FVC %PRED-PRE: NORMAL
FEV1/FVC PRED: NORMAL
FEV1/FVC-POST: 112 %
FEV1/FVC-PRE: 111 %
FVC %PRED-POST: NORMAL
FVC %PRED-PRE: NORMAL
FVC PRED: NORMAL
FVC-POST: NORMAL
FVC-PRE: NORMAL
GAW %PRED: NORMAL
GAW PRED: NORMAL
GAW: NORMAL
IC %PRED: NORMAL
IC PRED: NORMAL
IC: NORMAL
MEP: NORMAL
MIP: NORMAL
MVV %PRED-PRE: NORMAL
MVV PRED: NORMAL
MVV-PRE: NORMAL
PEF %PRED-POST: NORMAL
PEF %PRED-PRE: NORMAL
PEF PRED: NORMAL
PEF%CHNG: NORMAL
PEF-POST: NORMAL
PEF-PRE: NORMAL
RAW %PRED: NORMAL
RAW PRED: NORMAL
RAW: NORMAL
RV %PRED: NORMAL
RV PRED: NORMAL
RV: NORMAL
SVC %PRED: NORMAL
SVC PRED: NORMAL
SVC: NORMAL
TLC %PRED: 92 %
TLC PRED: NORMAL
TLC: NORMAL
VA %PRED: NORMAL
VA PRED: NORMAL
VA: NORMAL
VTG %PRED: NORMAL
VTG PRED: NORMAL
VTG: NORMAL

## 2023-01-17 PROCEDURE — 94729 DIFFUSING CAPACITY: CPT

## 2023-01-17 PROCEDURE — 71250 CT THORAX DX C-: CPT

## 2023-01-17 PROCEDURE — 94060 EVALUATION OF WHEEZING: CPT

## 2023-01-17 PROCEDURE — 94726 PLETHYSMOGRAPHY LUNG VOLUMES: CPT

## 2023-01-17 PROCEDURE — 6370000000 HC RX 637 (ALT 250 FOR IP): Performed by: STUDENT IN AN ORGANIZED HEALTH CARE EDUCATION/TRAINING PROGRAM

## 2023-01-17 PROCEDURE — 94760 N-INVAS EAR/PLS OXIMETRY 1: CPT

## 2023-01-17 RX ORDER — ALBUTEROL SULFATE 90 UG/1
4 AEROSOL, METERED RESPIRATORY (INHALATION) ONCE
Status: COMPLETED | OUTPATIENT
Start: 2023-01-17 | End: 2023-01-17

## 2023-01-17 RX ADMIN — Medication 4 PUFF: at 15:08

## 2023-01-17 ASSESSMENT — PULMONARY FUNCTION TESTS
FEV1_PERCENT_PREDICTED_POST: 80
FEV1_PERCENT_PREDICTED_PRE: 78
FEV1/FVC_PRE: 111
FEV1/FVC_POST: 112

## 2023-01-18 ENCOUNTER — TELEPHONE (OUTPATIENT)
Dept: CARDIOTHORACIC SURGERY | Age: 63
End: 2023-01-18

## 2023-01-18 NOTE — TELEPHONE ENCOUNTER
Called patient to see if he would like to come see Dr. Desiree Patterson Monday 1/23/23 to discuss results of testing and a plan for his atrial fib moving forward. The patient confirmed Monday 1/23/23 at 12 pm would be fine. Denied any further questions or concerns at this time.      Yue Epstein RN

## 2023-01-18 NOTE — PROCEDURES
Pulmonary Function Testing      Patient name:  Armond Weber     [facility] Unit #:   [id number]   Date of test: 1/17/2023  Date of interpretation:   1/18/2023    Mr. Armond Weber is a 60y.o. year-old non smoker. The spirometry data were acceptable and reproducible. Spirometry:  Flow volume loops were normal. The FEV-1/FVC ratio was normal. The  post-bronchodilator FEV-1 was 3.01 liters (80% of predicted), which was normal. The FVC was 3.58 liters (71% of predicted), which was decreased. Response to inhaled bronchodilators (albuterol) was not significant. Lung volumes:  Lung volumes were tested by plethysmography. The total lung capacity was 6.51 liters (92% of predicted), which was normal. The residual volume was 2.77 liters (108% of predicted), which was normal. The ratio of residual volume to total lung capacity (RV/TLC) was 111, which was normal. Specific airway resistance was normal.    Diffusion capacity was found to be increased.        Interpretation:  Essentially normal pulmonary function test.

## 2023-01-23 ENCOUNTER — OFFICE VISIT (OUTPATIENT)
Dept: CARDIOTHORACIC SURGERY | Age: 63
End: 2023-01-23
Payer: MEDICARE

## 2023-01-23 VITALS
TEMPERATURE: 98.2 F | OXYGEN SATURATION: 96 % | DIASTOLIC BLOOD PRESSURE: 68 MMHG | SYSTOLIC BLOOD PRESSURE: 119 MMHG | HEIGHT: 72 IN | HEART RATE: 70 BPM | BODY MASS INDEX: 31.15 KG/M2 | WEIGHT: 230 LBS

## 2023-01-23 DIAGNOSIS — I48.0 PAF (PAROXYSMAL ATRIAL FIBRILLATION) (HCC): ICD-10-CM

## 2023-01-23 DIAGNOSIS — G20 PARKINSON'S DISEASE (HCC): ICD-10-CM

## 2023-01-23 DIAGNOSIS — Z01.818 PRE-OP TESTING: Primary | ICD-10-CM

## 2023-01-23 DIAGNOSIS — E66.9 OBESITY (BMI 30.0-34.9): ICD-10-CM

## 2023-01-23 PROCEDURE — G8482 FLU IMMUNIZE ORDER/ADMIN: HCPCS | Performed by: STUDENT IN AN ORGANIZED HEALTH CARE EDUCATION/TRAINING PROGRAM

## 2023-01-23 PROCEDURE — 3017F COLORECTAL CA SCREEN DOC REV: CPT | Performed by: STUDENT IN AN ORGANIZED HEALTH CARE EDUCATION/TRAINING PROGRAM

## 2023-01-23 PROCEDURE — G8427 DOCREV CUR MEDS BY ELIG CLIN: HCPCS | Performed by: STUDENT IN AN ORGANIZED HEALTH CARE EDUCATION/TRAINING PROGRAM

## 2023-01-23 PROCEDURE — 1036F TOBACCO NON-USER: CPT | Performed by: STUDENT IN AN ORGANIZED HEALTH CARE EDUCATION/TRAINING PROGRAM

## 2023-01-23 PROCEDURE — 99214 OFFICE O/P EST MOD 30 MIN: CPT | Performed by: STUDENT IN AN ORGANIZED HEALTH CARE EDUCATION/TRAINING PROGRAM

## 2023-01-23 PROCEDURE — 3078F DIAST BP <80 MM HG: CPT | Performed by: STUDENT IN AN ORGANIZED HEALTH CARE EDUCATION/TRAINING PROGRAM

## 2023-01-23 PROCEDURE — G8417 CALC BMI ABV UP PARAM F/U: HCPCS | Performed by: STUDENT IN AN ORGANIZED HEALTH CARE EDUCATION/TRAINING PROGRAM

## 2023-01-23 PROCEDURE — 3074F SYST BP LT 130 MM HG: CPT | Performed by: STUDENT IN AN ORGANIZED HEALTH CARE EDUCATION/TRAINING PROGRAM

## 2023-01-23 NOTE — PROGRESS NOTES
[facility]   New Patient Clinic Note     1/23/2023 1:33 PM  Surgeon:  Lalo Mckeon for :  afib    Chief complaint : symptomatic afib  Subjective:  Mr. George Perez returns after preop testing. He has symptomatic afib. He would like to proceed with surgery    Review of Systems    Constitutional:  + night sweats, headaches, weight loss. Eyes:  No glaucoma, cataracts. Wears glasses   ENMT:  No nosebleeds, deviated septum. Cardiac:  + A-fib, HTN  Vascular:  No claudication, varicosities. GI:  No PUD, heartburn. + constipation   :  No kidney stones, frequent UTIs  Musculoskeletal:  + arthritis, No gout. Respiratory:  +  SOB, No emphysema, asthma. Integumentary:  No dermatitis, itching, rash. Neurological:  No stroke, TIAs, seizures + parkinsons  Psychiatric:  + depression, anxiety. Endocrine: No diabetes, thyroid issues. Hematologic:  + bleeding, easy bruising d/t medications   Immunologic:  No known cancer, steroid therapies.     Past Medical History:        Diagnosis Date    Anxiety     Atrial fibrillation [facility])     Atrial flutter ([address]     Cervical arthritis 3/30/2012    CTS (carpal tunnel syndrome) 3/13/2013    Essential tremor 2017    Hypertension     KATELIN (obstructive sleep apnea)     DOES NOT USE C-PAP    Osteoarthritis of back 11/6/2014    Osteoarthritis of right hip 11/6/2014    Parkinson disease ([address]     Parkinson's disease ([facility] [address] 8/19/2020    S/P ablation of atrial fibrillation 2/25/2016    Radiofrequency ablation of atrial fibrillation and pulmonary veins isolation Additional ablation of complex atrial fractionated electrogram     Sinusitis     Trigger finger 12/10/2014       Past Surgical History:        Procedure Laterality Date    ANKLE SURGERY  2015    ANKLE SURGERY Left     ATRIAL [address]    ATRIAL ABLATION SURGERY  2/25/16    RFCA of AF and PVI, additional ablation complex atrial fractioned electrogram    BACK SURGERY  1984    lumbar patino CARDIOVERSION  4/6/16    CARPAL TUNNEL RELEASE Right 11/19/2013    RIGHT CARPAL TUNNEL RELEASE      CARPAL TUNNEL RELEASE Left 12-10-13     CARPAL TUNNEL RELEASE                COLONOSCOPY  10/29/2007    COLONOSCOPY N/A 7/30/2020    COLONOSCOPY POLYPECTOMY SNARE/COLD BIOPSY performed by Brittney Álvarez MD at [address] [place] [address]      TOE SURGERY      TOTAL HIP ARTHROPLASTY Right 2016    right hip replacement       Allergies:  Patient has no known allergies. Social History:    TOBACCO:   reports that he quit smoking about 26 years ago. His smoking use included cigarettes. He has a 15.00 pack-year smoking history. He has never used smokeless tobacco.  ETOH:   reports that he does not currently use alcohol after a past usage of about 20.0 standard drinks per week. DRUGS:   reports current drug use. Drug: Marijuana Kayden Conchis). Family History:        Problem Relation Age of Onset    High Blood Pressure Mother     High Cholesterol Mother     Alcohol Abuse Father     Other Other         afib    Heart Disease Neg Hx     Mental Illness Neg Hx          Vital Signs: /68 (Site: Right Upper Arm, Position: Sitting)   Pulse 70   Temp 98.2 °F (36.8 °C) (Infrared)   Ht 6' (1.829 m)   Wt 230 lb (104.3 kg)   SpO2 96%   BMI 31.19 kg/m²      I/O:  No intake or output data in the 24 hours ending 01/23/23 1333    Exam:   Physical Exam  Vitals and nursing note reviewed. Constitutional:       General: He is not in acute distress. Appearance: Normal appearance. He is not toxic-appearing. HENT:      Head: Normocephalic. Eyes:      Extraocular Movements: Extraocular movements intact. Pupils: Pupils are equal, round, and reactive to light. Neck:      Vascular: No carotid bruit. Cardiovascular:      Rate and Rhythm: Normal rate and regular rhythm. Pulses: Normal pulses. Carotid pulses are 2+ on the right side and 2+ on the left side. Radial pulses are 2+ on the right side and 2+ on the left side. Femoral pulses are 2+ on the right side and 2+ on the left side. Dorsalis pedis pulses are 2+ on the right side and 2+ on the left side. Posterior tibial pulses are 2+ on the right side and 2+ on the left side. Heart sounds: No murmur heard. Pulmonary:      Effort: Pulmonary effort is normal. No respiratory distress. Breath sounds: Normal breath sounds. Abdominal:      General: Abdomen is flat. There is no distension. Palpations: Abdomen is soft. Tenderness: There is no abdominal tenderness. Musculoskeletal:         General: No swelling or tenderness. Right lower leg: No edema. Left lower leg: No edema. Lymphadenopathy:      Cervical: No cervical adenopathy. Skin:     General: Skin is warm and dry. Capillary Refill: Capillary refill takes less than 2 seconds. Findings: No erythema or rash. Neurological:      General: No focal deficit present. Mental Status: He is alert and oriented to person, place, and time. Mental status is at baseline. Psychiatric:         Mood and Affect: Mood normal.         Behavior: Behavior normal.         Thought Content: Thought content normal.         Judgment: Judgment normal.         Chest X-Ray:  normal     Labs:   CBC: No results for input(s): WBC, HGB, HCT, MCV, PLT in the last 72 hours. BMP: No results for input(s): NA, K, CL, CO2, PHOS, BUN, CREATININE, CA in the last 72 hours. PT/INR: No results for input(s): PROTIME, INR in the last 72 hours. APTT: No results for input(s): APTT in the last 72 hours. No results found for this or any previous visit from the past 90 days.        Scheduled Meds:     Patient Active Problem List   Diagnosis    Essential hypertension    KATELIN (obstructive sleep apnea)    PAF (paroxysmal atrial fibrillation) (HCC)    Disabling essential tremor    Adjustment disorder with anxious mood    Moderate episode of recurrent major depressive disorder (HCC)    Obesity (BMI 30.0-34. 9)    Parkinson's disease ([facility] [address]    Syncope and collapse    Atrial tachycardia (HCC)       Assessment/Plan:   Afib - plan for Convergent procedure. Explained multistage and no guarantee of afib control. Explained possibility of clot in LAAA requiring cancellation of procedure. Overall he agrees and gives consent      Tentative Surgery date/procedure: 2/6    Risks/Benefits/Alternatives: We discussed the risks benefits and alternatives to this procedure. The risks include bleeding, arrhythmia, myocardial infarction, renal failure, wound complications and death. We discussed benefits and alternatives and they agree and give their consent. Particular to this patient there were additional risk factors identified which we discussed.  These include Sandra Cordova MD

## 2023-01-25 ENCOUNTER — HOSPITAL ENCOUNTER (OUTPATIENT)
Age: 63
Setting detail: SURGERY ADMIT
DRG: 229 | End: 2023-01-25
Attending: STUDENT IN AN ORGANIZED HEALTH CARE EDUCATION/TRAINING PROGRAM | Admitting: STUDENT IN AN ORGANIZED HEALTH CARE EDUCATION/TRAINING PROGRAM
Payer: MEDICARE

## 2023-01-25 DIAGNOSIS — I48.91 ATRIAL FIBRILLATION, UNSPECIFIED TYPE (HCC): ICD-10-CM

## 2023-01-25 DIAGNOSIS — I48.11 LONGSTANDING PERSISTENT ATRIAL FIBRILLATION (HCC): Primary | ICD-10-CM

## 2023-01-25 NOTE — PROGRESS NOTES
Jeffry Homans    Age 60 y.o.    male    1960    MRN [id number]    2/6/2023  Arrival Time_____________  OR Time____________270 Min     Procedure(s):  ABLATION OF POSTERIOR WALL WITH SUB XIPHOID WINDOW AND LEFT VIDEO ASSISTED THORACOSCOPIC EXCLUSION OF LEFT ATRIAL APPENDAGE                      General   Surgeon(s):  Donnell Saini MD  [facility], [place]      DAY ADMIT ___  SDS/OP ___  OUTPT IN BED ___        Phone [phone] (home)     PCP _____________________ Phone_________________ Epic ( ) Epic CE ( ) Appt ________    ADDITIONAL INFO __________________________________ Cardio/Consult _____________    NOTES _____________________________________________________________________    ____________________________________________________________________________    PAT APPT DATE:________ TIME: ________  FAXED QAD: _______  (__) H&P w/ Hospitalist  __________________________________________________________________________  Preop Nurse phone screen complete: _____________  (__) CBC     (__) W/ DIFF ___________     (__) Hgb A1C    ___________  (__) CHEST X RAY   __________  (__) LIPID PROFILE  ___________  (__) EKG   __________  (__) PT/PTT   ___________  (__) PFT's   __________  (__) BMP   ___________  (__) CAROTIDS  __________  (__) CMP   ___________  (__) VEIN MAPPING  __________  (__) U/A   ___________  (__) HISTORY & PHYSICAL __________  (__) URINE C & S  ___________  (__) CARDIAC CLEARANCE __________  (__) U/A W/ FLEX  ___________  (__) PULM.  CLEARANCE __________  (__) SERUM PREGNANCY ___________  (__) Check Epic DOS orders __________  (__) TYPE & SCREEN __________repeat ( ) (__)  __________________ __________  (__) ALBUMIN   ___________  (__)  __________________ __________  (__) TRANSFERRIN  ___________  (__)  __________________ __________  (__) LIVER PROFILE  ___________  (__)  __________________ __________  (__) MRSA NASAL SWAB ___________  (__) URINE PREG DOS __________  (__) SED RATE  ___________  (__) BLOOD SUGAR DOS __________  (__) C-REACTIVE PROTEIN ___________    (__) VITAMIN D HYDROXY ___________  (__) BLOOD THINNERS __________    (__) ACE/ ARBS: _____________________     (__) BETABLOCKERS __________________

## 2023-01-26 ENCOUNTER — TELEPHONE (OUTPATIENT)
Dept: CARDIOTHORACIC SURGERY | Age: 63
End: 2023-01-26

## 2023-01-26 VITALS — HEIGHT: 72 IN | BODY MASS INDEX: 31.15 KG/M2 | WEIGHT: 230 LBS

## 2023-01-26 DIAGNOSIS — E66.9 OBESITY, UNSPECIFIED CLASSIFICATION, UNSPECIFIED OBESITY TYPE, UNSPECIFIED WHETHER SERIOUS COMORBIDITY PRESENT: ICD-10-CM

## 2023-01-26 DIAGNOSIS — Z01.818 PRE-OPERATIVE EXAM: Primary | ICD-10-CM

## 2023-01-26 DIAGNOSIS — Z01.818 PRE-OP TESTING: Primary | ICD-10-CM

## 2023-01-26 DIAGNOSIS — I48.91 ATRIAL FIBRILLATION, UNSPECIFIED TYPE (HCC): ICD-10-CM

## 2023-01-26 RX ORDER — CHLORHEXIDINE GLUCONATE 213 G/1000ML
SOLUTION TOPICAL
Qty: 1 EACH | Refills: 0 | Status: SHIPPED | OUTPATIENT
Start: 2023-01-26 | End: 2023-02-09

## 2023-01-26 RX ORDER — CHLORHEXIDINE GLUCONATE 0.12 MG/ML
RINSE ORAL
Qty: 420 ML | Refills: 0 | Status: SHIPPED | OUTPATIENT
Start: 2023-01-26

## 2023-01-26 NOTE — TELEPHONE ENCOUNTER
Called patient to let him know that he can  his pre-op medications at his local pharmacy. Instructed him on how to use them. He verbalized understanding and denied any further questions or concerns.      Carlo Alanis RN

## 2023-01-27 ENCOUNTER — HOSPITAL ENCOUNTER (OUTPATIENT)
Age: 63
Discharge: HOME | End: 2023-01-27
Payer: MEDICARE

## 2023-01-27 ENCOUNTER — TELEPHONE (OUTPATIENT)
Dept: CARDIOTHORACIC SURGERY | Age: 63
End: 2023-01-27

## 2023-01-27 ENCOUNTER — HOSPITAL ENCOUNTER (OUTPATIENT)
Dept: GENERAL RADIOLOGY | Age: 63
Discharge: HOME | End: 2023-01-27
Payer: MEDICARE

## 2023-01-27 DIAGNOSIS — I48.91 ATRIAL FIBRILLATION, UNSPECIFIED TYPE (HCC): ICD-10-CM

## 2023-01-27 DIAGNOSIS — Z01.818 PRE-OP TESTING: ICD-10-CM

## 2023-01-27 DIAGNOSIS — E66.9 OBESITY, UNSPECIFIED CLASSIFICATION, UNSPECIFIED OBESITY TYPE, UNSPECIFIED WHETHER SERIOUS COMORBIDITY PRESENT: ICD-10-CM

## 2023-01-27 LAB
ABO/RH: NORMAL
ANION GAP SERPL CALCULATED.3IONS-SCNC: 11 MMOL/L (ref 3–16)
ANTIBODY SCREEN: NORMAL
BASOPHILS ABSOLUTE: 0 K/UL (ref 0–0.2)
BASOPHILS RELATIVE PERCENT: 0.6 %
BILIRUBIN URINE: NEGATIVE
BLOOD, URINE: NEGATIVE
BUN BLDV-MCNC: 13 MG/DL (ref 7–20)
CALCIUM SERPL-MCNC: 9.7 MG/DL (ref 8.3–10.6)
CHLORIDE BLD-SCNC: 102 MMOL/L (ref 99–110)
CHOLESTEROL, TOTAL: 151 MG/DL (ref 0–199)
CLARITY: CLEAR
CO2: 27 MMOL/L (ref 21–32)
COLOR: YELLOW
CREAT SERPL-MCNC: 0.6 MG/DL (ref 0.8–1.3)
EOSINOPHILS ABSOLUTE: 0.4 K/UL (ref 0–0.6)
EOSINOPHILS RELATIVE PERCENT: 5.9 %
ESTIMATED AVERAGE GLUCOSE: 116.9 MG/DL
GFR SERPL CREATININE-BSD FRML MDRD: >60 ML/MIN/{1.73_M2}
GLUCOSE BLD-MCNC: 88 MG/DL (ref 70–99)
GLUCOSE URINE: NEGATIVE MG/DL
HBA1C MFR BLD: 5.7 %
HCT VFR BLD CALC: 48 % (ref 40.5–52.5)
HDLC SERPL-MCNC: 29 MG/DL (ref 40–60)
HEMOGLOBIN: 15.9 G/DL (ref 13.5–17.5)
INR BLD: 1.21 (ref 0.87–1.14)
KETONES, URINE: ABNORMAL MG/DL
LDL CHOLESTEROL CALCULATED: 92 MG/DL
LEUKOCYTE ESTERASE, URINE: NEGATIVE
LYMPHOCYTES ABSOLUTE: 1.4 K/UL (ref 1–5.1)
LYMPHOCYTES RELATIVE PERCENT: 20.9 %
MCH RBC QN AUTO: 31.3 PG (ref 26–34)
MCHC RBC AUTO-ENTMCNC: 33.1 G/DL (ref 31–36)
MCV RBC AUTO: 94.6 FL (ref 80–100)
MICROSCOPIC EXAMINATION: ABNORMAL
MONOCYTES ABSOLUTE: 0.6 K/UL (ref 0–1.3)
MONOCYTES RELATIVE PERCENT: 9.3 %
NEUTROPHILS ABSOLUTE: 4.2 K/UL (ref 1.7–7.7)
NEUTROPHILS RELATIVE PERCENT: 63.3 %
NITRITE, URINE: NEGATIVE
PDW BLD-RTO: 14.1 % (ref 12.4–15.4)
PH UA: 5.5 (ref 5–8)
PLATELET # BLD: 147 K/UL (ref 135–450)
PMV BLD AUTO: 11.7 FL (ref 5–10.5)
POTASSIUM SERPL-SCNC: 4.5 MMOL/L (ref 3.5–5.1)
PROTEIN UA: NEGATIVE MG/DL
PROTHROMBIN TIME: 15.3 SEC (ref 11.7–14.5)
RBC # BLD: 5.07 M/UL (ref 4.2–5.9)
SODIUM BLD-SCNC: 140 MMOL/L (ref 136–145)
SPECIFIC GRAVITY UA: 1.02 (ref 1–1.03)
TRIGL SERPL-MCNC: 149 MG/DL (ref 0–150)
URINE REFLEX TO CULTURE: ABNORMAL
URINE TYPE: ABNORMAL
UROBILINOGEN, URINE: 1 E.U./DL
VLDLC SERPL CALC-MCNC: 30 MG/DL
WBC # BLD: 6.6 K/UL (ref 4–11)

## 2023-01-27 PROCEDURE — 81003 URINALYSIS AUTO W/O SCOPE: CPT

## 2023-01-27 PROCEDURE — 85610 PROTHROMBIN TIME: CPT

## 2023-01-27 PROCEDURE — 85025 COMPLETE CBC W/AUTO DIFF WBC: CPT

## 2023-01-27 PROCEDURE — 83036 HEMOGLOBIN GLYCOSYLATED A1C: CPT

## 2023-01-27 PROCEDURE — 80048 BASIC METABOLIC PNL TOTAL CA: CPT

## 2023-01-27 PROCEDURE — 71046 X-RAY EXAM CHEST 2 VIEWS: CPT

## 2023-01-27 PROCEDURE — 86900 BLOOD TYPING SEROLOGIC ABO: CPT

## 2023-01-27 PROCEDURE — 80061 LIPID PANEL: CPT

## 2023-01-27 PROCEDURE — 86901 BLOOD TYPING SEROLOGIC RH(D): CPT

## 2023-01-27 PROCEDURE — 86850 RBC ANTIBODY SCREEN: CPT

## 2023-01-27 PROCEDURE — 36415 COLL VENOUS BLD VENIPUNCTURE: CPT

## 2023-01-27 NOTE — TELEPHONE ENCOUNTER
Spoke with patient in the office regarding surgery scheduled for 2/6/2023 at 7:45 am with arrival time of 5:30 am at [facility] with Dr. Elisabeth Macdonald to include: ablation of posterior wall with sub xiphoid window and left video assisted thoracoscopic exclusion of left atrial appendage. Patient to complete pre- ob lab work at [facility]. Patient has been instructed to take his last dose of Xarelto on 1/31/2022. Patient has been instructed not to eat or drink after midnight the morning of surgery and to call our office with any questions or concerns.

## 2023-02-02 NOTE — PROGRESS NOTES
1. Do not eat or drink anything after 12 midnight prior to surgery. This includes no water, chewing gum mints, or ice chips. You may brush your teeth and gargle the day of surgery but DO NOT SWALLOW THE WATER. 2. Please see your family doctor/pediatrician for a history and physical and/or concerning medications. Bring any test results/reports from your physician's office. If you are under the care of a heart doctor or specialist please be aware that you may be asked to see him or her for clearance. 3. You may be asked to stop blood thinners such as Coumadin, Plavix, Fragmin, and Lovenox or Anti-inflammatories such as Aspirin, Ibuprofen, Advil, and Naproxen prior to your surgery. Please check with your doctor before stopping these or any other medications. 4. Do not smoke, and do not drink any alcoholic beverages 24 hours prior to surgery. 5. You MUST make arrangements for a responsible adult to take you home after your surgery. For your safety, you will not be allowed to leave alone or drive yourself home. Your surgery will be cancelled if you do not have a ride home. Also for your safety, it is strongly suggested someone stay with you the first 24 hrs after your surgery. 6. A parent/legal guardian must accompany a child scheduled for surgery and plan to stay at the hospital until the child is discharged. Please do not bring other children with you. 7. For your comfort,please wear simple, loose fitting clothing to the hospital.  Please do not bring valuables (money, credit cards, checkbooks, etc.) Do not wear any makeup (including no eye makeup) or nail polish on your fingers or toes. 8. For your safety, please DO NOT wear any jewelry or piercings on day of surgery. All body piercing jewelry must be removed. 9. If you have dentures, they will be removed before going to the OR; for your convenience we will provide you with a container.   If you wear contact lenses or glasses, they will be removed, they will be removed, please bring a case for them. 10. If appicable,Please see your family doctor/pediatrician for a history & physical and/or concerning medications. Bring any test results/reports from your physician's office. 11. Remember to bring Blood Bank bracelet to the hospital on the day of surgery. 12. If you have a Living Will and Durable Power of  for Healthcare, please bring in a copy. 15. Notify your Surgeon if you develop any illness between now and surgery  time, cough, cold, fever, sore throat, nausea, vomiting, etc.  Please notify your surgeon if you experience dizziness, shortness of breath or blurred vision between now & the time of your surgery   14. DO NOT shave your operative site 96 hours prior to surgery. For face & neck surgery, men may use an electric razor 48 hours prior to surgery. 15. Shower the night before & morning of surgery __X_Hibiclens soap, use nasal ointment and mouth rinse as directed   16. To provide excellent care visitors will be limited to one in the room at any given time. 17.  Please bring picture ID and insurance card. 18.  Visit our web site for additional information:  [place]/surgery. Last dose for Xarelto 1/31/23 per surgeon office instructions.

## 2023-02-04 ENCOUNTER — ANESTHESIA EVENT (OUTPATIENT)
Dept: OPERATING ROOM | Age: 63
DRG: 229 | End: 2023-02-04
Payer: MEDICARE

## 2023-02-06 ENCOUNTER — APPOINTMENT (OUTPATIENT)
Dept: GENERAL RADIOLOGY | Age: 63
DRG: 229 | End: 2023-02-06
Attending: STUDENT IN AN ORGANIZED HEALTH CARE EDUCATION/TRAINING PROGRAM
Payer: MEDICARE

## 2023-02-06 ENCOUNTER — ANESTHESIA (OUTPATIENT)
Dept: OPERATING ROOM | Age: 63
DRG: 229 | End: 2023-02-06
Payer: MEDICARE

## 2023-02-06 PROBLEM — I48.91 ATRIAL FIBRILLATION (HCC): Status: ACTIVE | Noted: 2023-02-06

## 2023-02-06 LAB
ABO/RH: NORMAL
ALBUMIN SERPL-MCNC: 4.4 G/DL (ref 3.4–5)
ALP BLD-CCNC: 115 U/L (ref 40–129)
ALT SERPL-CCNC: <5 U/L (ref 10–40)
ANTIBODY SCREEN: NORMAL
AST SERPL-CCNC: 18 U/L (ref 15–37)
BASE EXCESS ARTERIAL: 1 (ref -3–3)
BILIRUB SERPL-MCNC: 0.7 MG/DL (ref 0–1)
BILIRUBIN DIRECT: <0.2 MG/DL (ref 0–0.3)
BILIRUBIN, INDIRECT: ABNORMAL MG/DL (ref 0–1)
CALCIUM IONIZED: 1.27 MMOL/L (ref 1.12–1.32)
GLUCOSE BLD-MCNC: 104 MG/DL (ref 70–99)
GLUCOSE BLD-MCNC: 166 MG/DL (ref 70–99)
HCO3 ARTERIAL: 26.3 MMOL/L (ref 21–29)
HCT VFR BLD CALC: 44.6 % (ref 40.5–52.5)
HEMOGLOBIN: 14.9 G/DL (ref 13.5–17.5)
HEMOGLOBIN: 15.8 GM/DL (ref 13.5–17.5)
LACTATE: 1.23 MMOL/L (ref 0.4–2)
MCH RBC QN AUTO: 31.8 PG (ref 26–34)
MCHC RBC AUTO-ENTMCNC: 33.4 G/DL (ref 31–36)
MCV RBC AUTO: 95.3 FL (ref 80–100)
O2 SAT, ARTERIAL: 100 % (ref 93–100)
PCO2 ARTERIAL: 47.1 MM HG (ref 35–45)
PDW BLD-RTO: 14.2 % (ref 12.4–15.4)
PERFORMED ON: ABNORMAL
PERFORMED ON: ABNORMAL
PH ARTERIAL: 7.36 (ref 7.35–7.45)
PLATELET # BLD: 148 K/UL (ref 135–450)
PMV BLD AUTO: 10.1 FL (ref 5–10.5)
PO2 ARTERIAL: 310.9 MM HG (ref 75–108)
POC HEMATOCRIT: 46 % (ref 40.5–52.5)
POC POTASSIUM: 4.8 MMOL/L (ref 3.5–5.1)
POC SAMPLE TYPE: ABNORMAL
POC SODIUM: 141 MMOL/L (ref 136–145)
RBC # BLD: 4.68 M/UL (ref 4.2–5.9)
TCO2 ARTERIAL: 28 MMOL/L
TOTAL PROTEIN: 7.3 G/DL (ref 6.4–8.2)
WBC # BLD: 14.1 K/UL (ref 4–11)

## 2023-02-06 PROCEDURE — 94669 MECHANICAL CHEST WALL OSCILL: CPT

## 2023-02-06 PROCEDURE — 76000 FLUOROSCOPY <1 HR PHYS/QHP: CPT

## 2023-02-06 PROCEDURE — 33266 ABLATE ATRIA X10SV ENDO: CPT

## 2023-02-06 PROCEDURE — 80076 HEPATIC FUNCTION PANEL: CPT

## 2023-02-06 PROCEDURE — 84295 ASSAY OF SERUM SODIUM: CPT

## 2023-02-06 PROCEDURE — 2580000003 HC RX 258: Performed by: STUDENT IN AN ORGANIZED HEALTH CARE EDUCATION/TRAINING PROGRAM

## 2023-02-06 PROCEDURE — 71045 X-RAY EXAM CHEST 1 VIEW: CPT

## 2023-02-06 PROCEDURE — 33266 ABLATE ATRIA X10SV ENDO: CPT | Performed by: STUDENT IN AN ORGANIZED HEALTH CARE EDUCATION/TRAINING PROGRAM

## 2023-02-06 PROCEDURE — 6360000002 HC RX W HCPCS

## 2023-02-06 PROCEDURE — C1769 GUIDE WIRE: HCPCS | Performed by: STUDENT IN AN ORGANIZED HEALTH CARE EDUCATION/TRAINING PROGRAM

## 2023-02-06 PROCEDURE — C1729 CATH, DRAINAGE: HCPCS | Performed by: STUDENT IN AN ORGANIZED HEALTH CARE EDUCATION/TRAINING PROGRAM

## 2023-02-06 PROCEDURE — 2780000010 HC IMPLANT OTHER: Performed by: STUDENT IN AN ORGANIZED HEALTH CARE EDUCATION/TRAINING PROGRAM

## 2023-02-06 PROCEDURE — C1751 CATH, INF, PER/CENT/MIDLINE: HCPCS | Performed by: STUDENT IN AN ORGANIZED HEALTH CARE EDUCATION/TRAINING PROGRAM

## 2023-02-06 PROCEDURE — 86923 COMPATIBILITY TEST ELECTRIC: CPT

## 2023-02-06 PROCEDURE — 6370000000 HC RX 637 (ALT 250 FOR IP): Performed by: STUDENT IN AN ORGANIZED HEALTH CARE EDUCATION/TRAINING PROGRAM

## 2023-02-06 PROCEDURE — 6360000002 HC RX W HCPCS: Performed by: NURSE PRACTITIONER

## 2023-02-06 PROCEDURE — 3600000008 HC SURGERY OHS BASE: Performed by: STUDENT IN AN ORGANIZED HEALTH CARE EDUCATION/TRAINING PROGRAM

## 2023-02-06 PROCEDURE — 88300 SURGICAL PATH GROSS: CPT

## 2023-02-06 PROCEDURE — 83605 ASSAY OF LACTIC ACID: CPT

## 2023-02-06 PROCEDURE — 86901 BLOOD TYPING SEROLOGIC RH(D): CPT

## 2023-02-06 PROCEDURE — C9290 INJ, BUPIVACAINE LIPOSOME: HCPCS | Performed by: STUDENT IN AN ORGANIZED HEALTH CARE EDUCATION/TRAINING PROGRAM

## 2023-02-06 PROCEDURE — 94761 N-INVAS EAR/PLS OXIMETRY MLT: CPT

## 2023-02-06 PROCEDURE — 3600000018 HC SURGERY OHS ADDTL 15MIN: Performed by: STUDENT IN AN ORGANIZED HEALTH CARE EDUCATION/TRAINING PROGRAM

## 2023-02-06 PROCEDURE — 82803 BLOOD GASES ANY COMBINATION: CPT

## 2023-02-06 PROCEDURE — 6370000000 HC RX 637 (ALT 250 FOR IP): Performed by: NURSE PRACTITIONER

## 2023-02-06 PROCEDURE — 82330 ASSAY OF CALCIUM: CPT

## 2023-02-06 PROCEDURE — 2000000000 HC ICU R&B

## 2023-02-06 PROCEDURE — 85014 HEMATOCRIT: CPT

## 2023-02-06 PROCEDURE — 2500000003 HC RX 250 WO HCPCS: Performed by: STUDENT IN AN ORGANIZED HEALTH CARE EDUCATION/TRAINING PROGRAM

## 2023-02-06 PROCEDURE — 6360000002 HC RX W HCPCS: Performed by: STUDENT IN AN ORGANIZED HEALTH CARE EDUCATION/TRAINING PROGRAM

## 2023-02-06 PROCEDURE — 3209999900 FLUORO FOR SURGICAL PROCEDURES

## 2023-02-06 PROCEDURE — 84132 ASSAY OF SERUM POTASSIUM: CPT

## 2023-02-06 PROCEDURE — 2580000003 HC RX 258: Performed by: ANESTHESIOLOGY

## 2023-02-06 PROCEDURE — 2720000010 HC SURG SUPPLY STERILE: Performed by: STUDENT IN AN ORGANIZED HEALTH CARE EDUCATION/TRAINING PROGRAM

## 2023-02-06 PROCEDURE — B245ZZ4 ULTRASONOGRAPHY OF LEFT HEART, TRANSESOPHAGEAL: ICD-10-PCS | Performed by: ANESTHESIOLOGY

## 2023-02-06 PROCEDURE — 3700000001 HC ADD 15 MINUTES (ANESTHESIA): Performed by: STUDENT IN AN ORGANIZED HEALTH CARE EDUCATION/TRAINING PROGRAM

## 2023-02-06 PROCEDURE — 86850 RBC ANTIBODY SCREEN: CPT

## 2023-02-06 PROCEDURE — 3700000000 HC ANESTHESIA ATTENDED CARE: Performed by: STUDENT IN AN ORGANIZED HEALTH CARE EDUCATION/TRAINING PROGRAM

## 2023-02-06 PROCEDURE — 86900 BLOOD TYPING SEROLOGIC ABO: CPT

## 2023-02-06 PROCEDURE — 2700000000 HC OXYGEN THERAPY PER DAY

## 2023-02-06 PROCEDURE — 2709999900 HC NON-CHARGEABLE SUPPLY: Performed by: STUDENT IN AN ORGANIZED HEALTH CARE EDUCATION/TRAINING PROGRAM

## 2023-02-06 PROCEDURE — 02583ZZ DESTRUCTION OF CONDUCTION MECHANISM, PERCUTANEOUS APPROACH: ICD-10-PCS | Performed by: STUDENT IN AN ORGANIZED HEALTH CARE EDUCATION/TRAINING PROGRAM

## 2023-02-06 PROCEDURE — 02L74CK OCCLUSION OF LEFT ATRIAL APPENDAGE WITH EXTRALUMINAL DEVICE, PERCUTANEOUS ENDOSCOPIC APPROACH: ICD-10-PCS | Performed by: STUDENT IN AN ORGANIZED HEALTH CARE EDUCATION/TRAINING PROGRAM

## 2023-02-06 PROCEDURE — C2618 PROBE/NEEDLE, CRYO: HCPCS | Performed by: STUDENT IN AN ORGANIZED HEALTH CARE EDUCATION/TRAINING PROGRAM

## 2023-02-06 PROCEDURE — 85347 COAGULATION TIME ACTIVATED: CPT

## 2023-02-06 PROCEDURE — 85027 COMPLETE CBC AUTOMATED: CPT

## 2023-02-06 PROCEDURE — 2580000003 HC RX 258

## 2023-02-06 PROCEDURE — 2500000003 HC RX 250 WO HCPCS: Performed by: ANESTHESIOLOGY

## 2023-02-06 PROCEDURE — 94640 AIRWAY INHALATION TREATMENT: CPT

## 2023-02-06 PROCEDURE — 02584ZZ DESTRUCTION OF CONDUCTION MECHANISM, PERCUTANEOUS ENDOSCOPIC APPROACH: ICD-10-PCS | Performed by: STUDENT IN AN ORGANIZED HEALTH CARE EDUCATION/TRAINING PROGRAM

## 2023-02-06 PROCEDURE — 6360000002 HC RX W HCPCS: Performed by: ANESTHESIOLOGY

## 2023-02-06 PROCEDURE — P9045 ALBUMIN (HUMAN), 5%, 250 ML: HCPCS | Performed by: ANESTHESIOLOGY

## 2023-02-06 PROCEDURE — 82947 ASSAY GLUCOSE BLOOD QUANT: CPT

## 2023-02-06 DEVICE — DEVICE OCCL CLP L45MM LAA EXCLUSION SYS ATRICLP PRO V: Type: IMPLANTABLE DEVICE | Status: FUNCTIONAL

## 2023-02-06 RX ORDER — LIDOCAINE HYDROCHLORIDE 10 MG/ML
0.3 INJECTION, SOLUTION EPIDURAL; INFILTRATION; INTRACAUDAL; PERINEURAL
Status: DISCONTINUED | OUTPATIENT
Start: 2023-02-06 | End: 2023-02-06

## 2023-02-06 RX ORDER — METHYLPREDNISOLONE 4 MG/1
4 TABLET ORAL
Status: DISCONTINUED | OUTPATIENT
Start: 2023-02-07 | End: 2023-02-08 | Stop reason: HOSPADM

## 2023-02-06 RX ORDER — FOLIC ACID 1 MG/1
1 TABLET ORAL DAILY
COMMUNITY

## 2023-02-06 RX ORDER — LANOLIN ALCOHOL/MO/W.PET/CERES
1000 CREAM (GRAM) TOPICAL DAILY
COMMUNITY

## 2023-02-06 RX ORDER — ACETAMINOPHEN 325 MG/1
650 TABLET ORAL EVERY 8 HOURS SCHEDULED
Status: DISCONTINUED | OUTPATIENT
Start: 2023-02-06 | End: 2023-02-08 | Stop reason: HOSPADM

## 2023-02-06 RX ORDER — SODIUM CHLORIDE 9 MG/ML
INJECTION, SOLUTION INTRAVENOUS PRN
Status: DISCONTINUED | OUTPATIENT
Start: 2023-02-06 | End: 2023-02-08 | Stop reason: HOSPADM

## 2023-02-06 RX ORDER — ONDANSETRON 2 MG/ML
4 INJECTION INTRAMUSCULAR; INTRAVENOUS EVERY 6 HOURS PRN
Status: DISCONTINUED | OUTPATIENT
Start: 2023-02-06 | End: 2023-02-08 | Stop reason: HOSPADM

## 2023-02-06 RX ORDER — MORPHINE SULFATE 2 MG/ML
2 INJECTION, SOLUTION INTRAMUSCULAR; INTRAVENOUS
Status: DISCONTINUED | OUTPATIENT
Start: 2023-02-06 | End: 2023-02-08 | Stop reason: HOSPADM

## 2023-02-06 RX ORDER — CARBIDOPA AND LEVODOPA 50; 200 MG/1; MG/1
1 TABLET, EXTENDED RELEASE ORAL NIGHTLY
COMMUNITY

## 2023-02-06 RX ORDER — METOPROLOL TARTRATE 100 MG/1
100 TABLET ORAL 2 TIMES DAILY
COMMUNITY

## 2023-02-06 RX ORDER — COLCHICINE 0.6 MG/1
0.6 TABLET ORAL DAILY
Status: DISCONTINUED | OUTPATIENT
Start: 2023-02-06 | End: 2023-02-08 | Stop reason: HOSPADM

## 2023-02-06 RX ORDER — DILTIAZEM HYDROCHLORIDE 180 MG/1
180 CAPSULE, COATED, EXTENDED RELEASE ORAL DAILY
Status: DISCONTINUED | OUTPATIENT
Start: 2023-02-06 | End: 2023-02-08 | Stop reason: HOSPADM

## 2023-02-06 RX ORDER — METHYLPREDNISOLONE 4 MG/1
8 TABLET ORAL NIGHTLY
Status: COMPLETED | OUTPATIENT
Start: 2023-02-07 | End: 2023-02-07

## 2023-02-06 RX ORDER — SODIUM CHLORIDE 0.9 % (FLUSH) 0.9 %
5-40 SYRINGE (ML) INJECTION EVERY 12 HOURS SCHEDULED
Status: DISCONTINUED | OUTPATIENT
Start: 2023-02-06 | End: 2023-02-08 | Stop reason: HOSPADM

## 2023-02-06 RX ORDER — MORPHINE SULFATE 4 MG/ML
4 INJECTION, SOLUTION INTRAMUSCULAR; INTRAVENOUS
Status: DISCONTINUED | OUTPATIENT
Start: 2023-02-06 | End: 2023-02-08 | Stop reason: HOSPADM

## 2023-02-06 RX ORDER — FAMOTIDINE 10 MG/ML
20 INJECTION, SOLUTION INTRAVENOUS 2 TIMES DAILY
Status: DISCONTINUED | OUTPATIENT
Start: 2023-02-06 | End: 2023-02-07

## 2023-02-06 RX ORDER — PROPOFOL 10 MG/ML
INJECTION, EMULSION INTRAVENOUS PRN
Status: DISCONTINUED | OUTPATIENT
Start: 2023-02-06 | End: 2023-02-06 | Stop reason: SDUPTHER

## 2023-02-06 RX ORDER — FAMOTIDINE 10 MG/ML
20 INJECTION, SOLUTION INTRAVENOUS
Status: COMPLETED | OUTPATIENT
Start: 2023-02-06 | End: 2023-02-06

## 2023-02-06 RX ORDER — KETOROLAC TROMETHAMINE 30 MG/ML
INJECTION, SOLUTION INTRAMUSCULAR; INTRAVENOUS
Status: COMPLETED
Start: 2023-02-06 | End: 2023-02-06

## 2023-02-06 RX ORDER — SUCCINYLCHOLINE/SOD CL,ISO/PF 100 MG/5ML
SYRINGE (ML) INTRAVENOUS PRN
Status: DISCONTINUED | OUTPATIENT
Start: 2023-02-06 | End: 2023-02-06 | Stop reason: SDUPTHER

## 2023-02-06 RX ORDER — FENTANYL CITRATE 0.05 MG/ML
INJECTION, SOLUTION INTRAMUSCULAR; INTRAVENOUS PRN
Status: DISCONTINUED | OUTPATIENT
Start: 2023-02-06 | End: 2023-02-06 | Stop reason: SDUPTHER

## 2023-02-06 RX ORDER — CARBIDOPA AND LEVODOPA 50; 200 MG/1; MG/1
1 TABLET, EXTENDED RELEASE ORAL NIGHTLY
Status: DISCONTINUED | OUTPATIENT
Start: 2023-02-06 | End: 2023-02-08 | Stop reason: HOSPADM

## 2023-02-06 RX ORDER — ALBUMIN, HUMAN INJ 5% 5 %
SOLUTION INTRAVENOUS PRN
Status: DISCONTINUED | OUTPATIENT
Start: 2023-02-06 | End: 2023-02-06 | Stop reason: SDUPTHER

## 2023-02-06 RX ORDER — FLUTICASONE PROPIONATE 50 MCG
1 SPRAY, SUSPENSION (ML) NASAL DAILY PRN
COMMUNITY

## 2023-02-06 RX ORDER — METOPROLOL TARTRATE 5 MG/5ML
5 INJECTION INTRAVENOUS ONCE
Status: COMPLETED | OUTPATIENT
Start: 2023-02-06 | End: 2023-02-06

## 2023-02-06 RX ORDER — FEXOFENADINE HCL 180 MG/1
180 TABLET ORAL DAILY
COMMUNITY

## 2023-02-06 RX ORDER — OXYCODONE HYDROCHLORIDE 5 MG/1
5 TABLET ORAL EVERY 4 HOURS PRN
Status: DISCONTINUED | OUTPATIENT
Start: 2023-02-06 | End: 2023-02-08 | Stop reason: HOSPADM

## 2023-02-06 RX ORDER — LIDOCAINE HYDROCHLORIDE 20 MG/ML
INJECTION, SOLUTION INFILTRATION; PERINEURAL PRN
Status: DISCONTINUED | OUTPATIENT
Start: 2023-02-06 | End: 2023-02-06 | Stop reason: SDUPTHER

## 2023-02-06 RX ORDER — SODIUM CHLORIDE 0.9 % (FLUSH) 0.9 %
5-40 SYRINGE (ML) INJECTION PRN
Status: DISCONTINUED | OUTPATIENT
Start: 2023-02-06 | End: 2023-02-08 | Stop reason: HOSPADM

## 2023-02-06 RX ORDER — METHOCARBAMOL 750 MG/1
750 TABLET, FILM COATED ORAL 4 TIMES DAILY
Status: DISCONTINUED | OUTPATIENT
Start: 2023-02-06 | End: 2023-02-08 | Stop reason: HOSPADM

## 2023-02-06 RX ORDER — CLONAZEPAM 0.5 MG/1
0.25 TABLET ORAL NIGHTLY
Status: DISCONTINUED | OUTPATIENT
Start: 2023-02-06 | End: 2023-02-08 | Stop reason: HOSPADM

## 2023-02-06 RX ORDER — ROCURONIUM BROMIDE 10 MG/ML
INJECTION, SOLUTION INTRAVENOUS PRN
Status: DISCONTINUED | OUTPATIENT
Start: 2023-02-06 | End: 2023-02-06 | Stop reason: SDUPTHER

## 2023-02-06 RX ORDER — DEXTROSE, SODIUM CHLORIDE, AND POTASSIUM CHLORIDE 5; .45; .15 G/100ML; G/100ML; G/100ML
INJECTION INTRAVENOUS CONTINUOUS
Status: DISCONTINUED | OUTPATIENT
Start: 2023-02-06 | End: 2023-02-07

## 2023-02-06 RX ORDER — SODIUM CHLORIDE 9 MG/ML
INJECTION, SOLUTION INTRAVENOUS CONTINUOUS PRN
Status: DISCONTINUED | OUTPATIENT
Start: 2023-02-06 | End: 2023-02-06 | Stop reason: SDUPTHER

## 2023-02-06 RX ORDER — METHYLPREDNISOLONE 4 MG/1
4 TABLET ORAL NIGHTLY
Status: DISCONTINUED | OUTPATIENT
Start: 2023-02-08 | End: 2023-02-08 | Stop reason: HOSPADM

## 2023-02-06 RX ORDER — METHYLPREDNISOLONE 4 MG/1
24 TABLET ORAL ONCE
Status: COMPLETED | OUTPATIENT
Start: 2023-02-06 | End: 2023-02-06

## 2023-02-06 RX ORDER — FAMOTIDINE 20 MG/1
20 TABLET, FILM COATED ORAL 2 TIMES DAILY
Status: DISCONTINUED | OUTPATIENT
Start: 2023-02-06 | End: 2023-02-08 | Stop reason: HOSPADM

## 2023-02-06 RX ORDER — ONDANSETRON 2 MG/ML
INJECTION INTRAMUSCULAR; INTRAVENOUS PRN
Status: DISCONTINUED | OUTPATIENT
Start: 2023-02-06 | End: 2023-02-06 | Stop reason: SDUPTHER

## 2023-02-06 RX ORDER — CEFAZOLIN SODIUM IN 0.9 % NACL 2 G/100 ML
2000 PLASTIC BAG, INJECTION (ML) INTRAVENOUS
Status: COMPLETED | OUTPATIENT
Start: 2023-02-06 | End: 2023-02-06

## 2023-02-06 RX ORDER — KETOROLAC TROMETHAMINE 30 MG/ML
15 INJECTION, SOLUTION INTRAMUSCULAR; INTRAVENOUS EVERY 6 HOURS
Status: DISPENSED | OUTPATIENT
Start: 2023-02-06 | End: 2023-02-08

## 2023-02-06 RX ORDER — SODIUM CHLORIDE 9 MG/ML
INJECTION, SOLUTION INTRAVENOUS PRN
Status: DISCONTINUED | OUTPATIENT
Start: 2023-02-06 | End: 2023-02-06

## 2023-02-06 RX ORDER — ALBUTEROL SULFATE 2.5 MG/3ML
2.5 SOLUTION RESPIRATORY (INHALATION)
Status: DISCONTINUED | OUTPATIENT
Start: 2023-02-06 | End: 2023-02-08 | Stop reason: HOSPADM

## 2023-02-06 RX ORDER — SODIUM CHLORIDE 0.9 % (FLUSH) 0.9 %
5-40 SYRINGE (ML) INJECTION EVERY 12 HOURS SCHEDULED
Status: DISCONTINUED | OUTPATIENT
Start: 2023-02-06 | End: 2023-02-06

## 2023-02-06 RX ORDER — SODIUM CHLORIDE 0.9 % (FLUSH) 0.9 %
5-40 SYRINGE (ML) INJECTION PRN
Status: DISCONTINUED | OUTPATIENT
Start: 2023-02-06 | End: 2023-02-06

## 2023-02-06 RX ADMIN — Medication 1500 MG: at 08:15

## 2023-02-06 RX ADMIN — MORPHINE SULFATE 2 MG: 2 INJECTION, SOLUTION INTRAMUSCULAR; INTRAVENOUS at 17:59

## 2023-02-06 RX ADMIN — ALBUTEROL SULFATE 2.5 MG: 2.5 SOLUTION RESPIRATORY (INHALATION) at 19:49

## 2023-02-06 RX ADMIN — ROCURONIUM BROMIDE 20 MG: 10 SOLUTION INTRAVENOUS at 10:02

## 2023-02-06 RX ADMIN — ALBUTEROL SULFATE 2.5 MG: 2.5 SOLUTION RESPIRATORY (INHALATION) at 15:22

## 2023-02-06 RX ADMIN — ALBUMIN (HUMAN) 250 ML: 12.5 INJECTION, SOLUTION INTRAVENOUS at 08:53

## 2023-02-06 RX ADMIN — SUGAMMADEX 200 MG: 100 INJECTION, SOLUTION INTRAVENOUS at 11:56

## 2023-02-06 RX ADMIN — FAMOTIDINE 20 MG: 20 TABLET, FILM COATED ORAL at 20:46

## 2023-02-06 RX ADMIN — Medication 100 MG: at 07:55

## 2023-02-06 RX ADMIN — LIDOCAINE HYDROCHLORIDE 100 MG: 20 INJECTION, SOLUTION INFILTRATION; PERINEURAL at 11:08

## 2023-02-06 RX ADMIN — DILTIAZEM HYDROCHLORIDE 180 MG: 180 CAPSULE, COATED, EXTENDED RELEASE ORAL at 13:26

## 2023-02-06 RX ADMIN — OXYCODONE 5 MG: 5 TABLET ORAL at 20:46

## 2023-02-06 RX ADMIN — POTASSIUM CHLORIDE, DEXTROSE MONOHYDRATE AND SODIUM CHLORIDE: 150; 5; 450 INJECTION, SOLUTION INTRAVENOUS at 12:40

## 2023-02-06 RX ADMIN — OXYCODONE 5 MG: 5 TABLET ORAL at 16:27

## 2023-02-06 RX ADMIN — LIDOCAINE HYDROCHLORIDE 100 MG: 20 INJECTION, SOLUTION INFILTRATION; PERINEURAL at 07:54

## 2023-02-06 RX ADMIN — MORPHINE SULFATE 2 MG: 2 INJECTION, SOLUTION INTRAMUSCULAR; INTRAVENOUS at 12:30

## 2023-02-06 RX ADMIN — SODIUM CHLORIDE: 9 INJECTION, SOLUTION INTRAVENOUS at 07:45

## 2023-02-06 RX ADMIN — METOPROLOL TARTRATE 5 MG: 5 INJECTION, SOLUTION INTRAVENOUS at 16:56

## 2023-02-06 RX ADMIN — MORPHINE SULFATE 4 MG: 4 INJECTION, SOLUTION INTRAMUSCULAR; INTRAVENOUS at 21:39

## 2023-02-06 RX ADMIN — PHENYLEPHRINE HYDROCHLORIDE 200 MCG: 10 INJECTION INTRAVENOUS at 10:11

## 2023-02-06 RX ADMIN — CLONAZEPAM 0.25 MG: 0.5 TABLET ORAL at 20:46

## 2023-02-06 RX ADMIN — PROPOFOL 150 MG: 10 INJECTION, EMULSION INTRAVENOUS at 07:54

## 2023-02-06 RX ADMIN — MUPIROCIN: 20 OINTMENT TOPICAL at 20:50

## 2023-02-06 RX ADMIN — DEXAMETHASONE SODIUM PHOSPHATE 12 MG: 4 INJECTION, SOLUTION INTRAMUSCULAR; INTRAVENOUS at 08:25

## 2023-02-06 RX ADMIN — ACETAMINOPHEN 325MG 650 MG: 325 TABLET ORAL at 20:45

## 2023-02-06 RX ADMIN — CARBIDOPA AND LEVODOPA 3 TABLET: 25; 100 TABLET ORAL at 13:19

## 2023-02-06 RX ADMIN — ONDANSETRON 4 MG: 2 INJECTION INTRAMUSCULAR; INTRAVENOUS at 11:48

## 2023-02-06 RX ADMIN — ROCURONIUM BROMIDE 50 MG: 10 SOLUTION INTRAVENOUS at 09:03

## 2023-02-06 RX ADMIN — ROCURONIUM BROMIDE 50 MG: 10 SOLUTION INTRAVENOUS at 08:05

## 2023-02-06 RX ADMIN — METHOCARBAMOL TABLETS 750 MG: 750 TABLET, COATED ORAL at 20:46

## 2023-02-06 RX ADMIN — Medication 2000 MG: at 08:30

## 2023-02-06 RX ADMIN — KETOROLAC TROMETHAMINE 15 MG: 30 INJECTION, SOLUTION INTRAMUSCULAR; INTRAVENOUS at 12:57

## 2023-02-06 RX ADMIN — COLCHICINE 0.6 MG: 0.6 TABLET, FILM COATED ORAL at 13:20

## 2023-02-06 RX ADMIN — FENTANYL CITRATE 100 MCG: 50 INJECTION, SOLUTION INTRAMUSCULAR; INTRAVENOUS at 08:53

## 2023-02-06 RX ADMIN — CARBIDOPA AND LEVODOPA 1 TABLET: 50; 200 TABLET, EXTENDED RELEASE ORAL at 20:50

## 2023-02-06 RX ADMIN — METHYLPREDNISOLONE 24 MG: 4 TABLET ORAL at 13:21

## 2023-02-06 RX ADMIN — FAMOTIDINE 20 MG: 10 INJECTION, SOLUTION INTRAVENOUS at 06:28

## 2023-02-06 RX ADMIN — KETOROLAC TROMETHAMINE 15 MG: 30 INJECTION, SOLUTION INTRAMUSCULAR; INTRAVENOUS at 19:24

## 2023-02-06 RX ADMIN — FENTANYL CITRATE 150 MCG: 50 INJECTION, SOLUTION INTRAMUSCULAR; INTRAVENOUS at 07:54

## 2023-02-06 RX ADMIN — Medication 10 ML: at 21:39

## 2023-02-06 RX ADMIN — CARBIDOPA AND LEVODOPA 3 TABLET: 25; 100 TABLET ORAL at 17:56

## 2023-02-06 ASSESSMENT — PAIN - FUNCTIONAL ASSESSMENT: PAIN_FUNCTIONAL_ASSESSMENT: ACTIVITIES ARE NOT PREVENTED

## 2023-02-06 ASSESSMENT — PAIN SCALES - GENERAL
PAINLEVEL_OUTOF10: 7
PAINLEVEL_OUTOF10: 8
PAINLEVEL_OUTOF10: 10
PAINLEVEL_OUTOF10: 0
PAINLEVEL_OUTOF10: 9

## 2023-02-06 ASSESSMENT — PAIN DESCRIPTION - DESCRIPTORS
DESCRIPTORS: SHARP;ACHING
DESCRIPTORS: CRAMPING;ACHING

## 2023-02-06 ASSESSMENT — PAIN DESCRIPTION - ORIENTATION: ORIENTATION: MID;LOWER

## 2023-02-06 ASSESSMENT — LIFESTYLE VARIABLES: SMOKING_STATUS: 0

## 2023-02-06 ASSESSMENT — PAIN DESCRIPTION - LOCATION
LOCATION: CHEST
LOCATION: STERNUM;RIB CAGE;BACK

## 2023-02-06 NOTE — ANESTHESIA PRE PROCEDURE
Department of Anesthesiology  Preprocedure Note       Name:  Julia Aase   Age:  60 y.o.  :  1960                                          MRN:  [id number]         Date:  2023      Surgeon: Sara Jane):  MD Fariba Trujillo MD    Procedure: Procedure(s):  ABLATION OF POSTERIOR WALL WITH SUB XIPHOID WINDOW AND LEFT VIDEO ASSISTED THORACOSCOPIC EXCLUSION OF LEFT ATRIAL APPENDAGE    Medications prior to admission:   Prior to Admission medications    Medication Sig Start Date End Date Taking? Authorizing Provider   fexofenadine (ALLEGRA ALLERGY) 180 MG tablet Take 180 mg by mouth daily    Historical Provider, MD   mupirocin (BACTROBAN NASAL) 2 % nasal ointment Place is both nares night before and morning of surgery 23   Chel Ivy MD   chlorhexidine (PERIDEX) 0.12 % solution Swish and spit  night before and morning of surgery 23   Chel Ivy MD   chlorhexidine (HIBICLENS) 4 % external liquid Lather and rinse entire body night before and morning of surgery 23  Chel Ivy MD   rivaroxaban Joretta Blew) 20 MG TABS tablet Take 1 tablet by mouth daily (with breakfast) 22   Alber Keohler MD   dilTIAZem (CARDIZEM CD) 180 MG extended release capsule TAKE 1 CAPSULE BY MOUTH DAILY 22   Aj Gamble APRN - CNP   clonazePAM (KLONOPIN) 0.5 MG tablet nightly as needed. 3/15/21   Historical Provider, MD   carbidopa-levodopa (SINEMET)  MG per tablet Take 3 tablets by mouth 4 times daily  Patient taking differently: Take 3 tablets by mouth Takes every 3 hours 20   EMILIA Duggan - CNP       Current medications:    No current facility-administered medications for this visit. No current outpatient medications on file.      Facility-Administered Medications Ordered in Other Visits   Medication Dose Route Frequency Provider Last Rate Last Admin    ceFAZolin (ANCEF) 2000 mg in 0.9% sodium chloride 100 mL IVPB  2,000 mg IntraVENous On Call to OR Oscar Cruz MD        vancomycin 1500 mg in sodium chloride 0.9% 300 mL IVPB  1,500 mg IntraVENous On Call to Jorge Durán MD        lidocaine PF 1 % injection 0.3 mL  0.3 mL IntraDERmal Once PRN Shahid Dempsey MD        sodium chloride flush 0.9 % injection 5-40 mL  5-40 mL IntraVENous 2 times per day Shahid Dempsey MD        sodium chloride flush 0.9 % injection 5-40 mL  5-40 mL IntraVENous PRN Shahid Dempsey MD        0.9 % sodium chloride infusion   IntraVENous PRN Shahid Dempsey MD           Allergies:  No Known Allergies    Problem List:    Patient Active Problem List   Diagnosis Code    Essential hypertension I10    KATELIN (obstructive sleep apnea) G47.33    PAF (paroxysmal atrial fibrillation) ([facility]) I48.0    Disabling essential tremor G25.0    Adjustment disorder with anxious mood F43.22    Moderate episode of recurrent major depressive disorder ([address] F33.1    Obesity (BMI 30.0-34. 9) E66.9    Parkinson's disease ([address] G20    Syncope and collapse R55    Atrial tachycardia ([address] I47.1       Past Medical History:        Diagnosis Date    Anxiety     Atrial fibrillation ([address]     Atrial flutter ([address]     Cervical arthritis 03/30/2012    CTS (carpal tunnel syndrome) 03/13/2013    Essential tremor 2017    [place] (hard of hearing)     Hypertension     KATELIN (obstructive sleep apnea)     uses CPAP    Osteoarthritis of back 11/06/2014    Osteoarthritis of right hip 11/06/2014    Parkinson's disease ([address] 08/19/2020    PONV (postoperative nausea and vomiting)     S/P ablation of atrial fibrillation 02/25/2016    Radiofrequency ablation of atrial fibrillation and pulmonary veins isolation Additional ablation of complex atrial fractionated electrogram     Sinusitis     Trigger finger 12/10/2014       Past Surgical History:        Procedure Laterality Date    ANKLE SURGERY  2015    ANKLE SURGERY Left     ATRIAL ABLATION SURGERY  1998    ATRIAL ABLATION SURGERY 16    RFCA of AF and PVI, additional ablation complex atrial fractioned electrogram    BACK SURGERY  1984    lumbar patino    CARDIOVERSION  16    CARPAL TUNNEL RELEASE Right 2013    RIGHT CARPAL TUNNEL RELEASE      CARPAL TUNNEL RELEASE Left 12-10-13     CARPAL TUNNEL RELEASE                COLONOSCOPY  10/29/2007    COLONOSCOPY N/A 2020    COLONOSCOPY POLYPECTOMY SNARE/COLD BIOPSY performed by Valdez Curry MD at [facility]    SALIVARY GLAND SURGERY      TOE SURGERY      TOTAL HIP ARTHROPLASTY Right 2016    right hip replacement       Social History:    Social History     Tobacco Use    Smoking status: Former     Packs/day: 1.00     Years: 15.00     Pack years: 15.00     Types: Cigarettes     Quit date: 1996     Years since quittin.7    Smokeless tobacco: Never   Substance Use Topics    Alcohol use: Not Currently     Alcohol/week: 20.0 standard drinks     Types: 20 Standard drinks or equivalent per week     Comment: none for 5 months                                Counseling given: Not Answered      Vital Signs (Current): There were no vitals filed for this visit.                                            BP Readings from Last 3 Encounters:   23 121/75   23 119/68   23 132/82       NPO Status:                                                                                 BMI:   Wt Readings from Last 3 Encounters:   23 227 lb 11.2 oz (103.3 kg)   23 230 lb (104.3 kg)   23 229 lb (103.9 kg)     There is no height or weight on file to calculate BMI.    CBC:   Lab Results   Component Value Date/Time    WBC 6.6 2023 07:45 AM    RBC 5.07 2023 07:45 AM    HGB 15.9 2023 07:45 AM    HCT 48.0 2023 07:45 AM    MCV 94.6 2023 07:45 AM    RDW 14.1 2023 07:45 AM     2023 07:45 AM       CMP:   Lab Results   Component Value Date/Time     2023 07:45 AM    K 4.5 01/27/2023 07:45 AM    K 4.5 08/20/2020 04:30 AM     01/27/2023 07:45 AM    CO2 27 01/27/2023 07:45 AM    BUN 13 01/27/2023 07:45 AM    CREATININE 0.6 01/27/2023 07:45 AM    GFRAA >60 09/06/2022 09:17 AM    GFRAA >60 03/19/2013 03:02 PM    AGRATIO 1.4 08/28/2022 01:07 PM    LABGLOM >60 01/27/2023 07:45 AM    GLUCOSE 88 01/27/2023 07:45 AM    PROT 6.8 08/28/2022 01:07 PM    PROT 7.8 03/19/2013 03:00 PM    CALCIUM 9.7 01/27/2023 07:45 AM    BILITOT 0.5 08/28/2022 01:07 PM    ALKPHOS 117 08/28/2022 01:07 PM    AST <5 08/28/2022 01:07 PM    ALT <5 08/28/2022 01:07 PM       POC Tests: No results for input(s): POCGLU, POCNA, POCK, POCCL, POCBUN, POCHEMO, POCHCT in the last 72 hours. Coags:   Lab Results   Component Value Date/Time    PROTIME 15.3 01/27/2023 07:45 AM    INR 1.21 01/27/2023 07:45 AM    APTT 37.0 08/20/2020 04:30 AM       HCG (If Applicable): No results found for: PREGTESTUR, PREGSERUM, HCG, HCGQUANT     ABGs: No results found for: PHART, PO2ART, SCQ1MJI, ZVT6XOB, BEART, B2MMRHUD     Type & Screen (If Applicable):  Lab Results   Component Value Date    LABABO O 11/21/2016    [address] Positive 11/21/2016       Drug/Infectious Status (If Applicable):  No results found for: HIV, HEPCAB    COVID-19 Screening (If Applicable):   Lab Results   Component Value Date/Time    COVID19 Not Detected 01/17/2022 10:32 AM    COVID19 NOT DETECTED 01/15/2021 08:43 AM         Anesthesia Evaluation     history of anesthetic complications: PONV.   Airway: Mallampati: II  TM distance: >3 FB   Neck ROM: full  Mouth opening: > = 3 FB   Dental: normal exam         Pulmonary: breath sounds clear to auscultation  (+) sleep apnea: on CPAP,      (-) COPD, asthma and not a current smoker                           Cardiovascular:    (+) hypertension:, dysrhythmias: atrial fibrillation,     (-)  angina,  CHF, orthopnea and PND    ECG reviewed  Rhythm: irregular  Rate: abnormal  Echocardiogram reviewed         Beta Blocker:  Not on Beta Blocker         Neuro/Psych:   (+) neuromuscular disease: Parkinson's disease, psychiatric history:   (-) seizures, TIA and CVA           GI/Hepatic/Renal:   (+) morbid obesity     (-) GERD, PUD and hepatitis       Endo/Other:    (+) no malignancy/cancer. (-) diabetes mellitus, hypothyroidism, hyperthyroidism, no malignancy/cancer               Abdominal:             Vascular:     - DVT and PE. Other Findings:             Anesthesia Plan      general     ASA 3       Induction: intravenous. arterial line and central line  MIPS: Postoperative opioids intended and Prophylactic antiemetics administered. Anesthetic plan and risks discussed with patient. Plan discussed with CRNA.                     Cinda Owusu MD   2/6/2023

## 2023-02-06 NOTE — ANESTHESIA POSTPROCEDURE EVALUATION
Department of Anesthesiology  Postprocedure Note    Patient: Flor Villela  MRN: [id number]  YOB: 1960  Date of evaluation: 2/6/2023      Procedure Summary     Date: 02/06/23 Room / Location: [address] [place] [address] / [facility]    Anesthesia Start: 0745 Anesthesia Stop: 1902    Procedure: ABLATION OF POSTERIOR WALL WITH SUB XIPHOID WINDOW AND LEFT VIDEO ASSISTED THORACOSCOPIC EXCLUSION OF LEFT ATRIAL APPENDAGE, CRYO NERVE ABLATION AND INTERCOSTAL NERVE BLOCKS Diagnosis:       Atrial fibrillation, unspecified type ([address]      (ATRIAL FIBRILLATION)    Surgeons: Miquel Danielle MD Responsible Provider: Neda Cortes MD    Anesthesia Type: general ASA Status: 3          Anesthesia Type: No value filed.     Mak Phase I: Mak Score: 10    Mak Phase II:        Anesthesia Post Evaluation    Patient location during evaluation: ICU  Level of consciousness: awake and alert  Airway patency: patent  Nausea & Vomiting: no vomiting  Complications: no  Cardiovascular status: blood pressure returned to baseline  Respiratory status: acceptable  Hydration status: euvolemic  Multimodal analgesia pain management approach

## 2023-02-06 NOTE — PROGRESS NOTES
Admit from [facility] 23, report prior from JULIA Lassiter updated by Dr. Joseph Dillon. ICU monitoring commenced, pt A&O with verbalization of severe pain (10 out of 10). See flow sheets and assessments.  Ritu Cervantes RN

## 2023-02-06 NOTE — PROGRESS NOTES
Discussed orders to d/ Carline and waite catheter. Keep Carline until tomorrow will discuss in rounds and keep waite until UO adequate. Informed UO for last hour 35mls.  Kailee Desouza RN

## 2023-02-06 NOTE — ANESTHESIA PROCEDURE NOTES
Procedure Performed: MASSIMO       Start Time:        End Time:      Preanesthesia Checklist:  Patient identified, IV assessed, risks and benefits discussed, monitors and equipment assessed, procedure being performed at surgeon's request and anesthesia consent obtained. General Procedure Information  Diagnostic Indications for Echo:  assessment of surgical repair  Physician Requesting Echo: Brittney Solano MD  Location performed:  OR  Intubated  Bite block placed  Heart visualized  Probe Insertion:  Easy  Probe Type:  3D  Modalities:  2D only, color flow mapping, continuous wave Doppler, contrast, pulse wave Doppler and M-mode        Anesthesia Information  Performed Personally  Anesthesiologist:  Ana Mccann MD    Transesophageal Echocardiogram (MASSIMO) ProbePlacement Procedure Note    NAME: Niharika Kat     MR#: [id number]    Surgical Procedure:    ABLATION OF POSTERIOR WALL WITH SUB XIPHOID WINDOW AND LEFT VIDEO ASSISTED THORACOSCOPIC EXCLUSION OF LEFT ATRIAL APPENDAGE, CRYO NERVE ABLATION AND INTERCOSTAL NERVE BLOCKS     Surgeon: Nichol Taylor             Date: 6 Feb 2023    Indication: bubba-op monitoring and access    Procedure: After induction of General Endotracheal Anesthesia, a standard MASSIMO probe was placed through the mouth and advanced into the esophagus and stomach. Multiple planes at multiple depths were viewed and the findings related to the surgeon. Ultrasound Findings:  Limited study. EF maintained 50+ No RWMA. ALFONSO inspected thoroughly in multiple views. No thrombus noted. Prominent pectinate muscles noted.  Pulse wave doppler through ALFONSO > 40 Cm/s2  Post procedure there is no flow noted in the ALFONSO, clip placement and closure of ALFONSO seen in real time     Complications: none    Jenny Pinto MD  8:38 AM

## 2023-02-06 NOTE — PLAN OF CARE
Problem: Discharge Planning  Goal: Discharge to home or other facility with appropriate resources  Outcome: Progressing     Problem: ABCDS Injury Assessment  Goal: Absence of physical injury  Outcome: Progressing     Problem: Respiratory - Adult  Goal: Achieves optimal ventilation and oxygenation  Outcome: Progressing     Problem: Cardiovascular - Adult  Goal: Maintains optimal cardiac output and hemodynamic stability  Outcome: Progressing     Problem: Cardiovascular - Adult  Goal: Absence of cardiac dysrhythmias or at baseline  Outcome: Progressing     Problem: Skin/Tissue Integrity - Adult  Goal: Skin integrity remains intact  Outcome: Progressing     Problem: Skin/Tissue Integrity - Adult  Goal: Incisions, wounds, or drain sites healing without S/S of infection  Outcome: Progressing     Problem: Musculoskeletal - Adult  Goal: Return mobility to safest level of function  Outcome: Progressing     Problem: Musculoskeletal - Adult  Goal: Maintain proper alignment of affected body part  Outcome: Progressing     Problem: Musculoskeletal - Adult  Goal: Return ADL status to a safe level of function  Outcome: Progressing     Problem: Gastrointestinal - Adult  Goal: Minimal or absence of nausea and vomiting  Outcome: Progressing     Problem: Gastrointestinal - Adult  Goal: Maintains or returns to baseline bowel function  Outcome: Progressing     Problem: Gastrointestinal - Adult  Goal: Maintains adequate nutritional intake  Outcome: Progressing     Problem: Infection - Adult  Goal: Absence of infection at discharge  Outcome: Progressing     Problem: Infection - Adult  Goal: Absence of infection during hospitalization  Outcome: Progressing     Problem: Metabolic/Fluid and Electrolytes - Adult  Goal: Electrolytes maintained within normal limits  Outcome: Progressing     Problem: Metabolic/Fluid and Electrolytes - Adult  Goal: Hemodynamic stability and optimal renal function maintained  Outcome: Progressing     Problem: Metabolic/Fluid and Electrolytes - Adult  Goal: Glucose maintained within prescribed range  Outcome: Progressing     Problem: Hematologic - Adult  Goal: Maintains hematologic stability  Outcome: Progressing     Problem: Pain  Goal: Verbalizes/displays adequate comfort level or baseline comfort level  Outcome: Progressing

## 2023-02-06 NOTE — ANESTHESIA PROCEDURE NOTES
Central Venous Line:    A central venous line was placed using surface landmarks, in the OR for the following indication(s): central venous access and CVP monitoring. Sterility preparation included the following: hand hygiene performed prior to procedure, maximum sterile barriers used and sterile technique used to drape from head to toe. The patient was placed in Trendelenburg position. The right internal jugular vein was prepped. The site was prepped with Chloraprep. A 9 Fr (size), 10 (length), introducer triple lumen was placed. During the procedure, the following specific steps were taken: target vein identified, needle advanced into vein and blood aspirated and guidewire advanced into vein. Intravenous verification was obtained by ultrasound and venous blood return. Post insertion care included: all ports aspirated, all ports flushed easily, guidewire removed intact, Biopatch applied, line sutured in place and dressing applied. During the procedure the patient experienced: patient tolerated procedure well with no complications and EBL 0mL.       Outcomes: uncomplicated and patient tolerated procedure well  Real-time US image taken/store: yes  Anesthesia type: general..No  Preanesthetic Checklist  Completed: patient identified, timeout performed and monitors applied/VS acknowledged

## 2023-02-06 NOTE — OP NOTE
Operative Note      Patient: Rickie Diaz  YOB: 1960  MRN: [id number]    Date of Procedure: 2/6/2023    Pre-Op Diagnosis: ATRIAL FIBRILLATION    Post-Op Diagnosis: Same       Procedure(s):  ABLATION OF POSTERIOR WALL WITH SUB XIPHOID WINDOW AND LEFT VIDEO ASSISTED THORACOSCOPIC EXCLUSION OF LEFT ATRIAL APPENDAGE, CRYO NERVE ABLATION AND INTERCOSTAL NERVE BLOCKS    Surgeon(s):  MD Lori Valdez MD Dr Janey Body assisted with pericardial exposure and clip application and broke scrub afterwards    Assistant:   Surgical Assistant: Estevan Brown  Physician Assistant: Nahum Patel, [address] first assisted with clip placement and entire subxiphoid exposure and ablation as well as wound closure    Anesthesia: General    Estimated Blood Loss (mL): Minimal    Complications: None    Specimens:   ID Type Source Tests Collected by Time Destination   A : XIPHIOD  Bone Bone SURGICAL PATHOLOGY Dane Sun MD 2/6/2023 8950        Implants:  Implant Name Type Inv. Item Serial No.  Lot No. LRB No. Used Action   DEVICE OCCL CLP L45MM ALFONSO EXCLUSION SYS ATRICLP PRO V - [id number]  DEVICE OCCL CLP L45MM ALFONSO EXCLUSION SYS ATRICLP PRO V  [employer] INC- [id number] N/A 1 Implanted         Drains:   Chest Tube Left Pleural 1 (Active)       Chest Tube Mediastinal 2 (Active)       Urinary Catheter 02/06/23 Ramos-Temperature (Active)       Findings: No clot in appendage in starting. Sinus rhythm upon finishing. Normal esophageal temperature without rise. No flow in the pended end of case    Detailed Description of Procedure:   After informed consent was obtained, patient was brought to the operating room and placed in supine position. A double-lumen tube was placed. A MASSIMO was performed which demonstrated no clot in the appendage. The case proceeded with arterial and central venous access. He was prepped and draped in sterile fashion.     As a precaution, a wire was placed in the right common femoral vein and left common femoral artery were emergent femoral bypass needed. A bump was placed in the left shoulder and the lung deflated. Ports were placed in the second fourth and seventh interspaces under direct visualization. Chest was insufflated. He did not tolerate a pressure of 10 but did tolerate a pressure of 5 for the duration of the case. A additional fourth interspace port was placed anteriorly. Pericardium was grasped posterior to the phrenic and incised. A [employer] device was used to make a hockey-stick shaped pericardiectomy. This was carried up to the level of the left pulmonary artery. There was no obvious ligamentum Pennelope Hollering. Hockey-stick was inserted in the appendage measured at 45 mm. The 45 mm V clip was brought through the 12 port and placed at the base of the appendage. This was confirmed visually as well as under continuous MASSIMO guidance. Once the clip was deployed there was no residual flow in the appendage. Next, the fourth through seventh interspaces were ablated with the cryo sphere nerve device. A Efe drain was brought through the 12 port and secured. Lung was reinflated under direct visualization. All the ports removed. And skin was closed with 4-0 Monocryl. Exparel and Marcaine was also injected at the sites. Next, the esophageal temperature probe was brought in place confirmed via C arm. A 5 cm subxiphoid incision was made and xiphoid removed and sent to pathology. Pericardium was grasped and incised in a T fashion. The cannula was inserted and anatomy surveyed. The IVC, inferior veins were identified and located. A total of 31 90-second burns were done in sequential fashion of the posterior wall of the left atrium. Continuous esophageal temperature probe monitoring was done there was no rise in temperature.   Once the area was visually surveyed and satisfactory the camera and probe were removed and a 24 [employer] Efe tube brought in through a separate stab incision. Fascia was then closed with 0 Vicryl 2-0 Vicryl 3-0 Vicryl for Monocryl. He was then awakened and taken to PACU in satisfactory and in sinus rhythm. At the end of the case all sponge needle counts were correct and I was present and scrubbed.     Electronically signed by Domenic Dakin, MD on 2/6/2023 at 12:04 PM

## 2023-02-06 NOTE — H&P
Cardiac, V[facility] Thor[facility] Surgeons   New Patient Clinic Note      1/23/2023 1:33 PM  Surgeon:  Dick Hubbard for :  afib     Chief complaint : symptomatic afib  Subjective:    12/29/22  Mr. Earlene Jacques pleasant man with early onset parkinsons but still functional. Long history of afib with an a flutter ablation in the 1990s at [facility]. In February 2016 he had PVI and CAFE with restoration of sinus rhythm. He was taken off propafenone and cardioverted in April 2016. He recurred with A. fib in 2020 and had a PVI and roofline in 2021. He was in sinus for most of 2021 however by September 2022 he had 42% A. fib burden. He is utilizing his CPAP machine more regularly. He is currently taking diltiazem and Xarelto. His brother had a transthoracic maze at [facility] recently and this made him interested in surgical afibrillation. 1/23/23 Mr. Earlene Jacques returns after preop testing. He has symptomatic afib. He would like to proceed with surgery     Review of Systems     Constitutional:  + night sweats, headaches, weight loss. Eyes:  No glaucoma, cataracts. Wears glasses   ENMT:  No nosebleeds, deviated septum. Cardiac:  + A-fib, HTN  Vascular:  No claudication, varicosities. GI:  No PUD, heartburn. + constipation   :  No kidney stones, frequent UTIs  Musculoskeletal:  + arthritis, No gout. Respiratory:  +  SOB, No emphysema, asthma. Integumentary:  No dermatitis, itching, rash. Neurological:  No stroke, TIAs, seizures + parkinsons  Psychiatric:  + depression, anxiety. Endocrine: No diabetes, thyroid issues. Hematologic:  + bleeding, easy bruising d/t medications   Immunologic:  No known cancer, steroid therapies.      Past Medical History:    Past Medical History             Diagnosis Date    Anxiety      Atrial fibrillation [facility])      Atrial flutter ([address]      Cervical arthritis 3/30/2012    CTS (carpal tunnel syndrome) 3/13/2013    Essential tremor 2017    Hypertension      KATELIN (obstructive sleep apnea) DOES NOT USE C-PAP    Osteoarthritis of back 11/6/2014    Osteoarthritis of right hip 11/6/2014    Parkinson disease ([facility] [address]      Parkinson's disease ([facility] [address] 8/19/2020    S/P ablation of atrial fibrillation 2/25/2016     Radiofrequency ablation of atrial fibrillation and pulmonary veins isolation Additional ablation of complex atrial fractionated electrogram     Sinusitis      Trigger finger 12/10/2014            Past Surgical History:    Past Surgical History             Procedure Laterality Date    ANKLE SURGERY   2015    ANKLE SURGERY Left      ATRIAL [address]    ATRIAL ABLATION SURGERY   2/25/16     RFCA of AF and PVI, additional ablation complex atrial fractioned electrogram    BACK SURGERY   1984     lumbar patino    CARDIOVERSION   4/6/16    CARPAL TUNNEL RELEASE Right 11/19/2013     RIGHT CARPAL TUNNEL RELEASE      CARPAL TUNNEL RELEASE Left 12-10-13      CARPAL TUNNEL RELEASE                COLONOSCOPY   10/29/2007    COLONOSCOPY N/A 7/30/2020     COLONOSCOPY POLYPECTOMY SNARE/COLD BIOPSY performed by Tayla Hutchison MD at [facility] 8 HIP ARTHROPLASTY Right 2016     right hip replacement            Allergies:  Patient has no known allergies. Social History:    TOBACCO:   reports that he quit smoking about 26 years ago. His smoking use included cigarettes. He has a 15.00 pack-year smoking history. He has never used smokeless tobacco.  ETOH:   reports that he does not currently use alcohol after a past usage of about 20.0 standard drinks per week. DRUGS:   reports current drug use. Drug: Marijuana [employer]).         Family History:    Family History             Problem Relation Age of Onset    High Blood Pressure Mother      High Cholesterol Mother      Alcohol Abuse Father      Other Other           afib    Heart Disease Neg Hx      Mental Illness Neg Hx                 Vital Signs: /68 (Site: Right Upper Arm, Position: Sitting)   Pulse 70   Temp 98.2 °F (36.8 °C) (Infrared)   Ht 6' (1.829 m)   Wt 230 lb (104.3 kg)   SpO2 96%   BMI 31.19 kg/m²      I/O:  No intake or output data in the 24 hours ending 01/23/23 2517     Exam:   Physical Exam  Vitals and nursing note reviewed. Constitutional:       General: He is not in acute distress. Appearance: Normal appearance. He is not toxic-appearing. HENT:      Head: Normocephalic. Eyes:      Extraocular Movements: Extraocular movements intact. Pupils: Pupils are equal, round, and reactive to light. Neck:      Vascular: No carotid bruit. Cardiovascular:      Rate and Rhythm: Normal rate and regular rhythm. Pulses: Normal pulses. Carotid pulses are 2+ on the right side and 2+ on the left side. Radial pulses are 2+ on the right side and 2+ on the left side. Femoral pulses are 2+ on the right side and 2+ on the left side. Dorsalis pedis pulses are 2+ on the right side and 2+ on the left side. Posterior tibial pulses are 2+ on the right side and 2+ on the left side. Heart sounds: No murmur heard. Pulmonary:      Effort: Pulmonary effort is normal. No respiratory distress. Breath sounds: Normal breath sounds. Abdominal:      General: Abdomen is flat. There is no distension. Palpations: Abdomen is soft. Tenderness: There is no abdominal tenderness. Musculoskeletal:         General: No swelling or tenderness. Right lower leg: No edema. Left lower leg: No edema. Lymphadenopathy:      Cervical: No cervical adenopathy. Skin:     General: Skin is warm and dry. Capillary Refill: Capillary refill takes less than 2 seconds. Findings: No erythema or rash. Neurological:      General: No focal deficit present. Mental Status: He is alert and oriented to person, place, and time. Mental status is at baseline.    Psychiatric:         Mood and Affect: Mood normal. Behavior: Behavior normal.         Thought Content: Thought content normal.         Judgment: Judgment normal.          Heart cath: 1/12 with Dr. Hercules Child Cath PCI:  Anatomy:   LM-nml   LAD-prox 40%  Cx-nml  OM- nml  RCA-dom prox 20%  RPDA- nml  LVEF- 60%  LVG- nml  LVEDP- 5        Contrast: 80  Flouro Time: 2.2  Access: R radial a     Impression  ~Coronary Angiography w/ nonobstructive CAD  ~LVG with LVEF of 60 and no regional wall motion abnormalities     Chest X-Ray:  normal      Labs:   CBC: No results for input(s): WBC, HGB, HCT, MCV, PLT in the last 72 hours. BMP: No results for input(s): NA, K, CL, CO2, PHOS, BUN, CREATININE, CA in the last 72 hours. PT/INR: No results for input(s): PROTIME, INR in the last 72 hours. APTT: No results for input(s): APTT in the last 72 hours. No results found for this or any previous visit from the past 90 days. Scheduled Meds:          Patient Active Problem List   Diagnosis    Essential hypertension    KATELIN (obstructive sleep apnea)    PAF (paroxysmal atrial fibrillation) (HCC)    Disabling essential tremor    Adjustment disorder with anxious mood    Moderate episode of recurrent major depressive disorder (HCC)    Obesity (BMI 30.0-34. 9)    Parkinson's disease ([facility] [address]    Syncope and collapse    Atrial tachycardia (HCC)         Assessment/Plan:   Afib - plan for Convergent procedure 2/6. Explained multistage and no guarantee of afib control. Explained possibility of clot in LAAA requiring cancellation of procedure. Overall he agrees and gives consent        Tentative Surgery date/procedure: 2/6     Risks/Benefits/Alternatives: We discussed the risks benefits and alternatives to this procedure. The risks include bleeding, arrhythmia, myocardial infarction, renal failure, wound complications and death. We discussed benefits and alternatives and they agree and give their consent.   Particular to this patient there were additional risk factors identified which we discussed. These include Troy Albarado MD    Morning of Surgery:  Patient was seen & examined this morning. Imaging was reviewed. History and physical was reviewed. No new event or complaint since seen last.   We will proceed with the previously mentioned plan.     Carley Duncan PA-C

## 2023-02-06 NOTE — ANESTHESIA PROCEDURE NOTES
Arterial Line:    An arterial line was placed using surface landmarks, in the OR for the following indication(s): continuous blood pressure monitoring and blood sampling needed. A 20 gauge (size), 5 cm (length), Arrow (type) catheter was placed, Seldinger technique used, into the right brachial artery, secured by suture and Tegaderm. Anesthesia type: General    Events:  patient tolerated procedure well with no complications and EBL 0mL.   Anesthesiologist: Ana Mccann MD  Performed: Anesthesiologist   Preanesthetic Checklist  Completed: patient identified, IV checked, site marked, risks and benefits discussed, surgical/procedural consents, equipment checked, pre-op evaluation, timeout performed, anesthesia consent given, oxygen available and monitors applied/VS acknowledged

## 2023-02-06 NOTE — PROGRESS NOTES
Arrived to \[facility]\"", IV started, labs obtained, consents verified, medications reviewed. NPO @ 7 pm per patient. Partner taking belongings to car until after procedure.  Do Newsome RN

## 2023-02-06 NOTE — CONSENT
Informed Consent for Blood Component Transfusion Note    I have discussed with the patient the rationale for blood component transfusion; its benefits in treating or preventing fatigue, organ damage, or death; and its risk which includes mild transfusion reactions, rare risk of blood borne infection, or more serious but rare reactions. I have discussed the alternatives to transfusion, including the risk and consequences of not receiving transfusion. The patient had an opportunity to ask questions and had agreed to proceed with transfusion of blood components.     Electronically signed by Jw Estrada MD on 2/6/23 at 7:32 AM EST

## 2023-02-07 ENCOUNTER — APPOINTMENT (OUTPATIENT)
Dept: GENERAL RADIOLOGY | Age: 63
DRG: 229 | End: 2023-02-07
Attending: STUDENT IN AN ORGANIZED HEALTH CARE EDUCATION/TRAINING PROGRAM
Payer: MEDICARE

## 2023-02-07 LAB
ANION GAP SERPL CALCULATED.3IONS-SCNC: 9 MMOL/L (ref 3–16)
BUN BLDV-MCNC: 16 MG/DL (ref 7–20)
CALCIUM SERPL-MCNC: 9.4 MG/DL (ref 8.3–10.6)
CHLORIDE BLD-SCNC: 101 MMOL/L (ref 99–110)
CO2: 24 MMOL/L (ref 21–32)
CREAT SERPL-MCNC: 0.7 MG/DL (ref 0.8–1.3)
GFR SERPL CREATININE-BSD FRML MDRD: >60 ML/MIN/{1.73_M2}
GLUCOSE BLD-MCNC: 133 MG/DL (ref 70–99)
HCT VFR BLD CALC: 42.7 % (ref 40.5–52.5)
HEMOGLOBIN: 13.9 G/DL (ref 13.5–17.5)
MAGNESIUM: 1.9 MG/DL (ref 1.8–2.4)
MCH RBC QN AUTO: 30.8 PG (ref 26–34)
MCHC RBC AUTO-ENTMCNC: 32.6 G/DL (ref 31–36)
MCV RBC AUTO: 94.5 FL (ref 80–100)
PDW BLD-RTO: 14.7 % (ref 12.4–15.4)
PHOSPHORUS: 3 MG/DL (ref 2.5–4.9)
PLATELET # BLD: 147 K/UL (ref 135–450)
PMV BLD AUTO: 10.1 FL (ref 5–10.5)
POTASSIUM REFLEX MAGNESIUM: 4.4 MMOL/L (ref 3.5–5.1)
RBC # BLD: 4.52 M/UL (ref 4.2–5.9)
SODIUM BLD-SCNC: 134 MMOL/L (ref 136–145)
WBC # BLD: 13.9 K/UL (ref 4–11)

## 2023-02-07 PROCEDURE — 83735 ASSAY OF MAGNESIUM: CPT

## 2023-02-07 PROCEDURE — 97162 PT EVAL MOD COMPLEX 30 MIN: CPT

## 2023-02-07 PROCEDURE — 97530 THERAPEUTIC ACTIVITIES: CPT

## 2023-02-07 PROCEDURE — 6370000000 HC RX 637 (ALT 250 FOR IP): Performed by: STUDENT IN AN ORGANIZED HEALTH CARE EDUCATION/TRAINING PROGRAM

## 2023-02-07 PROCEDURE — 80048 BASIC METABOLIC PNL TOTAL CA: CPT

## 2023-02-07 PROCEDURE — 6360000002 HC RX W HCPCS: Performed by: STUDENT IN AN ORGANIZED HEALTH CARE EDUCATION/TRAINING PROGRAM

## 2023-02-07 PROCEDURE — 94640 AIRWAY INHALATION TREATMENT: CPT

## 2023-02-07 PROCEDURE — 6370000000 HC RX 637 (ALT 250 FOR IP): Performed by: NURSE PRACTITIONER

## 2023-02-07 PROCEDURE — 2580000003 HC RX 258: Performed by: STUDENT IN AN ORGANIZED HEALTH CARE EDUCATION/TRAINING PROGRAM

## 2023-02-07 PROCEDURE — 97116 GAIT TRAINING THERAPY: CPT

## 2023-02-07 PROCEDURE — 85027 COMPLETE CBC AUTOMATED: CPT

## 2023-02-07 PROCEDURE — 84100 ASSAY OF PHOSPHORUS: CPT

## 2023-02-07 PROCEDURE — 71045 X-RAY EXAM CHEST 1 VIEW: CPT

## 2023-02-07 PROCEDURE — 94669 MECHANICAL CHEST WALL OSCILL: CPT

## 2023-02-07 PROCEDURE — 97166 OT EVAL MOD COMPLEX 45 MIN: CPT

## 2023-02-07 PROCEDURE — 99024 POSTOP FOLLOW-UP VISIT: CPT | Performed by: NURSE PRACTITIONER

## 2023-02-07 PROCEDURE — 2000000000 HC ICU R&B

## 2023-02-07 PROCEDURE — 97535 SELF CARE MNGMENT TRAINING: CPT

## 2023-02-07 PROCEDURE — 6360000002 HC RX W HCPCS: Performed by: NURSE PRACTITIONER

## 2023-02-07 RX ORDER — FUROSEMIDE 10 MG/ML
20 INJECTION INTRAMUSCULAR; INTRAVENOUS ONCE
Status: COMPLETED | OUTPATIENT
Start: 2023-02-07 | End: 2023-02-07

## 2023-02-07 RX ORDER — POLYETHYLENE GLYCOL 3350 17 G/17G
17 POWDER, FOR SOLUTION ORAL DAILY
Status: DISCONTINUED | OUTPATIENT
Start: 2023-02-07 | End: 2023-02-08 | Stop reason: HOSPADM

## 2023-02-07 RX ADMIN — MUPIROCIN: 20 OINTMENT TOPICAL at 20:24

## 2023-02-07 RX ADMIN — CARBIDOPA AND LEVODOPA 3 TABLET: 25; 100 TABLET ORAL at 18:09

## 2023-02-07 RX ADMIN — MORPHINE SULFATE 2 MG: 2 INJECTION, SOLUTION INTRAMUSCULAR; INTRAVENOUS at 02:22

## 2023-02-07 RX ADMIN — FAMOTIDINE 20 MG: 20 TABLET, FILM COATED ORAL at 09:43

## 2023-02-07 RX ADMIN — FUROSEMIDE 20 MG: 10 INJECTION, SOLUTION INTRAMUSCULAR; INTRAVENOUS at 09:43

## 2023-02-07 RX ADMIN — FAMOTIDINE 20 MG: 20 TABLET, FILM COATED ORAL at 20:21

## 2023-02-07 RX ADMIN — ACETAMINOPHEN 325MG 650 MG: 325 TABLET ORAL at 13:41

## 2023-02-07 RX ADMIN — CARBIDOPA AND LEVODOPA 1 TABLET: 50; 200 TABLET, EXTENDED RELEASE ORAL at 20:27

## 2023-02-07 RX ADMIN — METHYLPREDNISOLONE 8 MG: 4 TABLET ORAL at 20:28

## 2023-02-07 RX ADMIN — ALBUTEROL SULFATE 2.5 MG: 2.5 SOLUTION RESPIRATORY (INHALATION) at 12:02

## 2023-02-07 RX ADMIN — ACETAMINOPHEN 325MG 650 MG: 325 TABLET ORAL at 05:32

## 2023-02-07 RX ADMIN — CARBIDOPA AND LEVODOPA 3 TABLET: 25; 100 TABLET ORAL at 15:54

## 2023-02-07 RX ADMIN — CARBIDOPA AND LEVODOPA 3 TABLET: 25; 100 TABLET ORAL at 09:43

## 2023-02-07 RX ADMIN — OXYCODONE 5 MG: 5 TABLET ORAL at 09:48

## 2023-02-07 RX ADMIN — CARBIDOPA AND LEVODOPA 3 TABLET: 25; 100 TABLET ORAL at 13:40

## 2023-02-07 RX ADMIN — KETOROLAC TROMETHAMINE 15 MG: 30 INJECTION, SOLUTION INTRAMUSCULAR; INTRAVENOUS at 06:50

## 2023-02-07 RX ADMIN — METHOCARBAMOL TABLETS 750 MG: 750 TABLET, COATED ORAL at 15:54

## 2023-02-07 RX ADMIN — CLONAZEPAM 0.25 MG: 0.5 TABLET ORAL at 20:21

## 2023-02-07 RX ADMIN — ACETAMINOPHEN 325MG 650 MG: 325 TABLET ORAL at 20:21

## 2023-02-07 RX ADMIN — OXYCODONE 5 MG: 5 TABLET ORAL at 15:54

## 2023-02-07 RX ADMIN — OXYCODONE 5 MG: 5 TABLET ORAL at 05:27

## 2023-02-07 RX ADMIN — ALBUTEROL SULFATE 2.5 MG: 2.5 SOLUTION RESPIRATORY (INHALATION) at 15:32

## 2023-02-07 RX ADMIN — METHOCARBAMOL TABLETS 750 MG: 750 TABLET, COATED ORAL at 20:21

## 2023-02-07 RX ADMIN — RIVAROXABAN 20 MG: 20 TABLET, FILM COATED ORAL at 09:56

## 2023-02-07 RX ADMIN — KETOROLAC TROMETHAMINE 15 MG: 30 INJECTION, SOLUTION INTRAMUSCULAR; INTRAVENOUS at 01:12

## 2023-02-07 RX ADMIN — Medication 10 ML: at 09:48

## 2023-02-07 RX ADMIN — ALBUTEROL SULFATE 2.5 MG: 2.5 SOLUTION RESPIRATORY (INHALATION) at 19:55

## 2023-02-07 RX ADMIN — MORPHINE SULFATE 4 MG: 4 INJECTION, SOLUTION INTRAMUSCULAR; INTRAVENOUS at 11:38

## 2023-02-07 RX ADMIN — METHOCARBAMOL TABLETS 750 MG: 750 TABLET, COATED ORAL at 13:40

## 2023-02-07 RX ADMIN — KETOROLAC TROMETHAMINE 15 MG: 30 INJECTION, SOLUTION INTRAMUSCULAR; INTRAVENOUS at 13:43

## 2023-02-07 RX ADMIN — MUPIROCIN: 20 OINTMENT TOPICAL at 09:54

## 2023-02-07 RX ADMIN — METHYLPREDNISOLONE 4 MG: 4 TABLET ORAL at 11:56

## 2023-02-07 RX ADMIN — POLYETHYLENE GLYCOL 3350 17 G: 17 POWDER, FOR SOLUTION ORAL at 13:44

## 2023-02-07 RX ADMIN — METHOCARBAMOL TABLETS 750 MG: 750 TABLET, COATED ORAL at 09:43

## 2023-02-07 RX ADMIN — CARBIDOPA AND LEVODOPA 3 TABLET: 25; 100 TABLET ORAL at 05:31

## 2023-02-07 RX ADMIN — METHYLPREDNISOLONE 4 MG: 4 TABLET ORAL at 20:27

## 2023-02-07 RX ADMIN — Medication 10 ML: at 20:23

## 2023-02-07 RX ADMIN — ALBUTEROL SULFATE 2.5 MG: 2.5 SOLUTION RESPIRATORY (INHALATION) at 07:38

## 2023-02-07 RX ADMIN — OXYCODONE 5 MG: 5 TABLET ORAL at 20:21

## 2023-02-07 RX ADMIN — DILTIAZEM HYDROCHLORIDE 180 MG: 180 CAPSULE, COATED, EXTENDED RELEASE ORAL at 09:43

## 2023-02-07 RX ADMIN — METHYLPREDNISOLONE 4 MG: 4 TABLET ORAL at 06:50

## 2023-02-07 RX ADMIN — COLCHICINE 0.6 MG: 0.6 TABLET, FILM COATED ORAL at 09:43

## 2023-02-07 ASSESSMENT — PAIN SCALES - GENERAL
PAINLEVEL_OUTOF10: 5
PAINLEVEL_OUTOF10: 8
PAINLEVEL_OUTOF10: 4
PAINLEVEL_OUTOF10: 5
PAINLEVEL_OUTOF10: 5

## 2023-02-07 ASSESSMENT — PAIN DESCRIPTION - LOCATION
LOCATION: STERNUM;BACK
LOCATION: SHOULDER

## 2023-02-07 ASSESSMENT — PAIN DESCRIPTION - ORIENTATION
ORIENTATION: LEFT
ORIENTATION: MID

## 2023-02-07 ASSESSMENT — PAIN DESCRIPTION - DESCRIPTORS: DESCRIPTORS: ACHING

## 2023-02-07 NOTE — PROGRESS NOTES
Physical Therapy  Facility/Department: [facility] C2 CARD TELEMETRY  Physical Therapy Initial Assessment/Treatment    Name: Karlie Vicente  : 1960  MRN: [id number]  Date of Service: 2023    Discharge Recommendations:      PT Equipment Recommendations  Equipment Needed: No  Other: anticipate none needed at d/c      Patient Diagnosis(es): The encounter diagnosis was Atrial fibrillation, unspecified type ([address] Past Medical History:  has a past medical history of Anxiety, Atrial fibrillation ([address] Atrial flutter ([address] Cervical arthritis, CTS (carpal tunnel syndrome), Essential tremor, [place] (hard of hearing), Hypertension, KATELIN (obstructive sleep apnea), Osteoarthritis of back, Osteoarthritis of right hip, Parkinson's disease ([address] PONV (postoperative nausea and vomiting), S/P ablation of atrial fibrillation, Sinusitis, and Trigger finger. Past Surgical History:  has a past surgical history that includes Atrial ablation surgery (); Colonoscopy (10/29/2007); back surgery (); Toe Surgery; Salivary gland surgery; Carpal tunnel release (Right, 2013); Carpal tunnel release (Left, 12-10-13); Atrial ablation surgery (16); Cardioversion (16); Lumbar spine surgery (); Ankle surgery (); Total hip arthroplasty (Right, 2016); Ankle surgery (Left); and Colonoscopy (N/A, 2020). Assessment   Body Structures, Functions, Activity Limitations Requiring Skilled Therapeutic Intervention: Decreased functional mobility ; Decreased ADL status; Decreased ROM; Decreased endurance;Decreased balance;Decreased strength; Increased pain  Assessment: Pt seen for PT eval following L VATS/subxyphoid and posterior wall ablation. Prior to admission, pt was independent with mobility with use of cane PRN due to chronic ankle injury. Pt lived in single level home with 3 SALEEM with significant other, but reports possibility of going to home in [place] with mother, also single level home.  Pt currently SBA for bed mobility and transfers, and CGA for gait with 4WW up to 100 ft with one standing rest break senior living through. Pt currently functioning below baseline and would benefit from skilled therapy to return to PLOF. Recommending home with initial 24/7 A at d/c.   Specific Instructions for Next Treatment: progress functional mobility as tolerated  Therapy Prognosis: Good  Decision Making: Medium Complexity  Requires PT Follow-Up: Yes  Activity Tolerance  Activity Tolerance: Patient tolerated evaluation without incident;Patient limited by fatigue;Patient limited by endurance     Plan   Physcial Therapy Plan  General Plan: 5-7 times per week  Specific Instructions for Next Treatment: progress functional mobility as tolerated  Current Treatment Recommendations: Strengthening, Balance training, Functional mobility training, Transfer training, ROM, Gait training, Endurance training, Stair training, Home exercise program, Safety education & training, Equipment evaluation, education, & procurement, Therapeutic activities, Neuromuscular re-education  Safety Devices  Type of Devices: Call light within reach, Gait belt, Nurse notified, Left in bed  Restraints  Restraints Initially in Place: No     Restrictions  Restrictions/Precautions  Restrictions/Precautions: Fall Risk, Up as Tolerated  Position Activity Restriction  Other position/activity restrictions: Ambulate pt, 2 chest tubes, R IJ     Subjective   General  Chart Reviewed: Yes  Patient assessed for rehabilitation services?: Yes  Response To Previous Treatment: Not applicable  Family / Caregiver Present: No  Referring Practitioner: Qamar Campbell MD  Referral Date : 02/06/23  Diagnosis: L VATS, subxyphoid  Follows Commands: Within Functional Limits  General Comment  Comments: RN cleared for therapy         Social/Functional History  Social/Functional History  Lives With: Significant other (She can provide inital 24/7 support.)  Type of Home: House  Home Layout: One level  Home Access: Stairs to enter with rails  Entrance Stairs - Number of Steps: 3  Entrance Stairs - Rails: Left  Bathroom Shower/Tub: Tub/Shower unit  Bathroom Toilet: Standard  Home Equipment: [employer]  Has the patient had two or more falls in the past year or any fall with injury in the past year?: No  ADL Assistance: Independent  Homemaking Assistance: Independent  Ambulation Assistance: Independent (Uses cane as needed d/t past ankle injury)  Transfer Assistance: Independent  Active : Yes  Occupation: Retired  Type of Occupation:   Vision/Hearing  Vision  Vision: Impaired  Vision Exceptions: Wears glasses at all times  Hearing  Hearing: Within functional limits    Cognition   Orientation  Overall Orientation Status: Within Normal Limits  Orientation Level: Oriented X4     Objective   Heart Rate: 76  Heart Rate Source: Monitor  BP: 124/62  BP Location: Arterial  BP Method: Automatic  Patient Position: Up in chair  MAP (Calculated): 83  Resp: 19  SpO2: 96 %  O2 Device: None (Room air)     Observation/Palpation  Posture: Good  Gross Assessment  AROM: Within functional limits  Strength: Within functional limits  Sensation: Intact (denies numbness/tingling)                 Bed Mobility Training  Bed Mobility Training: Yes  Interventions: Verbal cues; Safety awareness training  Rolling: Modified independent (with bed rails)  Supine to Sit: Stand-by assistance  Sit to Supine: Stand-by assistance  Scooting: Stand-by assistance  Balance  Sitting: Intact  Standing: Impaired  Standing - Static: Good (with [id number])  Standing - Dynamic: Fair;Good (with [id number])  Transfer Training  Transfer Training: Yes  Overall Level of Assistance: Additional time;Stand-by assistance (with [id number])  Interventions: Verbal cues; Visual cues; Safety awareness training  Sit to Stand: Stand-by assistance  Stand to Sit: Stand-by assistance  Gait Training  Gait Training: Yes  Gait  Overall Level of Assistance: Contact-guard assistance; Adaptive equipment; Additional time  Interventions: Safety awareness training;Visual cues; Verbal cues  Base of Support: Narrowed  Speed/Court: Slow;Pace decreased (< 100 feet/min)  Step Length: Right shortened;Left shortened  Gait Abnormalities: Decreased step clearance; Other (comment) (forward flexed posture)  Distance (ft): 100 Feet  Assistive Device: Walker, rollator;Gait belt                          AM-PAC Score  AM-PAC Inpatient Mobility Raw Score : 19 (02/07/23 1254)  AM-PAC Inpatient T-Scale Score : 45.44 (02/07/23 1254)  Mobility Inpatient CMS 0-100% Score: 41.77 (02/07/23 1254)  Mobility Inpatient CMS G-Code Modifier : CK (02/07/23 1254)            Goals  Short Term Goals  Time Frame for Short Term Goals: 2/14/23  Short Term Goal 1: pt will perform bed mobility independently  Short Term Goal 2: pt will perform bed <> chair transfers with LRAD and supv  Short Term Goal 3: pt will ambulate 250 ft with LRAD and supv  Short Term Goal 4: pt will perform 12-15 reps of BLE exercises to improve LE functional strength by 2/11/23  Short Term Goal 5: pt will perform 3 stairs with single hand rail with supv  Patient Goals   Patient Goals : \"to go home\"       Education  Patient Education  Education Given To: Patient  Education Provided: Role of Therapy;Plan of Care;Transfer Training;Energy Conservation;Precautions; Fall Prevention Strategies  Education Provided Comments: role of PT, precuations following surgery, safety with AD  Education Method: Demonstration;Verbal  Barriers to Learning: None  Education Outcome: Verbalized understanding;Demonstrated understanding      Therapy Time   Individual Concurrent Group Co-treatment   Time In 0853         Time Out 0926         Minutes 33         Timed Code Treatment Minutes: 23 Minutes (10 min eval)     If pt is unable to be seen after this session, please let this note serve as discharge summary. Please see case management note for discharge disposition.   Thank you.    Giles Pecko, PT, DPT

## 2023-02-07 NOTE — ADDENDUM NOTE
Addendum  created 02/07/23 1653 by Kiersten Arevalo MD    Clinical Note Signed, Intraprocedure Blocks edited, Pend clinical note

## 2023-02-07 NOTE — PROGRESS NOTES
CVTS Cardiothoracic Progress Note:                                CC:  Post op follow up     Surgery: 2/6/23 ABLATION OF POSTERIOR WALL WITH SUB XIPHOID WINDOW AND LEFT VIDEO ASSISTED THORACOSCOPIC EXCLUSION OF LEFT ATRIAL APPENDAGE, CRYO NERVE ABLATION AND INTERCOSTAL NERVE [facility] course:  2/7 Pt reports issues with pain overnight but much improved this morning. No acute events. Hemodynamically stable in SR.      Past Medical History:   Diagnosis Date    Anxiety     Atrial fibrillation [facility])     Atrial flutter ([facility]     Cervical arthritis 03/30/2012    CTS (carpal tunnel syndrome) 03/13/2013    Essential tremor 2017    [place] (hard of hearing)     Hypertension     KATELIN (obstructive sleep apnea)     uses CPAP    Osteoarthritis of back 11/06/2014    Osteoarthritis of right hip 11/06/2014    Parkinson's disease ([facility] 08/19/2020    PONV (postoperative nausea and vomiting)     S/P ablation of atrial fibrillation 02/25/2016    Radiofrequency ablation of atrial fibrillation and pulmonary veins isolation Additional ablation of complex atrial fractionated electrogram     Sinusitis     Trigger finger 12/10/2014        Past Surgical History:   Procedure Laterality Date    ANKLE SURGERY  2015    ANKLE SURGERY Left     ATRIAL [place]  2/25/16    RFCA of AF and PVI, additional ablation complex atrial fractioned electrogram    BACK SURGERY  1984    lumbar patino    CARDIOVERSION  4/6/16    CARPAL TUNNEL RELEASE Right 11/19/2013    RIGHT CARPAL TUNNEL RELEASE      CARPAL TUNNEL RELEASE Left 12-10-13     CARPAL TUNNEL RELEASE                COLONOSCOPY  10/29/2007    COLONOSCOPY N/A 7/30/2020    COLONOSCOPY POLYPECTOMY SNARE/COLD BIOPSY performed by Musa Britt MD at [address] ARTHROPLASTY Right 2016    right hip replacement        Allergies as of 01/25/2023    (No Known Allergies) Patient Active Problem List   Diagnosis    Essential hypertension    KATELIN (obstructive sleep apnea)    PAF (paroxysmal atrial fibrillation) (HCC)    Disabling essential tremor    Adjustment disorder with anxious mood    Moderate episode of recurrent major depressive disorder (HCC)    Obesity (BMI 30.0-34. 9)    Parkinson's disease ([facility]    Syncope and collapse    Atrial tachycardia (HCC)    Atrial fibrillation (HCC)        Vital Signs: /62   Pulse 87   Temp 97.9 °F (36.6 °C) (Oral)   Resp 20   Ht 6' (1.829 m)   Wt 229 lb 8 oz (104.1 kg)   SpO2 96%   BMI 31.13 kg/m²  O2 Flow Rate (L/min): (S) 1 L/min     Admission Weight: Weight: 230 lb (104.3 kg)    Weight on 2/6 103.3 kg (Pre-op)   2/7 104.1 kg     Intake/Output:   Intake/Output Summary (Last 24 hours) at 2/7/2023 0932  Last data filed at 2/7/2023 0528  Gross per 24 hour   Intake 2497.21 ml   Output 1897 ml   Net 600.21 ml        LABORATORY DATA:     CBC:   Recent Labs     02/06/23  0828 02/06/23  1245 02/07/23  0500   WBC  --  14.1* 13.9*   HGB 15.8 14.9 13.9   HCT  --  44.6 42.7   MCV  --  95.3 94.5   PLT  --  148 147     BMP:   Recent Labs     02/07/23  0500   *   K 4.4      CO2 24   PHOS 3.0   BUN 16   CREATININE 0.7*     MG:    Recent Labs     02/07/23  0500   MG 1.90      CXR: 2/6/23  FINDINGS:   Heart size is mildly prominent aorta is mildly prominent. Central venous   catheter is in place. There appears to be a kink in the skin at the   insertion point. .  Lungs are normally expanded. Fullness in the perihilar   regions bilaterally. Mild motion artifact. Atrial appendage clip. Small   left pleural effusions. Mild spondylosis           Impression       Mild opacities bilaterally.      ________________________________________________________________________    Subjective:   Dietary Intake: improving    no Nausea   Pain Control: adequate  Complaints: mild post operative pain   Bowels: no BM     Objective:   General appearance: sitting up in chair, in nad   Lungs: diminished bilateral bases   Heart: S1S2 normal; SR on monitor  Chest: symmetrical expansion with inspiration and expirations  Abdomen: soft, non-tender  Bowel sounds: normoactive   Kidneys: UOP 1557 ml over 24 hours; Cr 0.7  Wound/Incisions: Surgical incisions with dressing CDI  Extremities: BLE pulses present; trace swelling noted in BLE  Neurological: alert, oriented and grossly non-focal   Chest tubes/Drains: left pleural chest tube with 110-100-423=146 ml of serosanguinous drainage over 24 hours; mediastinal chest tube with 5-20-5=30 ml of serosanguinous drainage over 24 hours; no airleak noted in either tube     Assessment:   Post-op: 1 days. Condition: In stable condition. Plan:   1. Cardiovascular:   Atrial fibrillation s/p ablation of posterior wall with sub xiphoid window and left video assisted thoracoscopic exclusion of left atrial appendage, cryo nerve ablation and ICNB (2/9)- remains in SR this morning. Continue diltiazem and xarelto   Medrol taper, colchicine   HTN- controlled     2. Pulmonary:   Satisfactory oxygen saturations on RA  Expansion measures   KATELIN- compliant with CPAP     3. Neurology:   Post op pain control with scheduled tylenol, toradol, robaxin and PRN oxy or morphine   Parkinson's disease- continue sinemet     4. Nephrology:   Satisfactory 24 hour UOP with Cr of 0.7  Lasix 20 mg IV x 1     5. Endocrinology:   Glucose controlled   No hx of DM, A1C 5.7 on 1/27/23    6. Hematology:   No issues at present, H&H stable at 13.9/42.7    7. Microbiology:   Leukocytosis- WBC 13.9. Likely reactive. Monitor     8. GI:  Daily miralax     9. Nutrition:   ADAT, good appetite this morning     10. Labs:   Labs and imaging reviewed as above     11. Post-op Drains/Wires:   Remove mediastinal chest tube after ambulation this morning. Consider removing arterial line this afternoon   Right IJ CVC (2/6)  Right brachial arterial line (2/6)    12.  D/C plan: ARTEM following. Likely discharge in the next 1-2 days.      13. Continue post-op care of patient in the ICU    GI prophylaxis: pepcid  DVT prophylaxis: xarelto, SCDs  ________________________________________________________________________  EMILIA Lay CNP  2/7/2023  9:32 AM

## 2023-02-07 NOTE — PROGRESS NOTES
Shift: Day    Procedure: ABLATION OF POSTERIOR WALL WITH SUB XIPHOID WINDOW AND LEFT VIDEO ASSISTED THORACOSCOPIC EXCLUSION OF LEFT ATRIAL APPENDAGE, CRYO NERVE ABLATION AND INTERCOSTAL NERVE BLOCKS    Admit from OR (time and date): 2/6/23 1224    Transition (time and date):     Surgery, return to OR no     Nursing assessment at handoff  stable    Most recent vitals: BP (!) 142/65   Pulse 89   Temp 98 °F (36.7 °C) (Oral)   Resp 20   Ht 6' (1.829 m)   Wt 229 lb 8 oz (104.1 kg)   SpO2 96%   BMI 31.13 kg/m²      Increased O2 requirements: no O2 requirements: Oxygen Therapy  SpO2: 96 %  Pulse Oximetry Type: Continuous  O2 Device: None (Room air)  O2 Flow Rate (L/min): (S) 1 L/min     Admission weight Weight: 230 lb (104.3 kg)  Today's weight   Wt Readings from Last 1 Encounters:   02/07/23 229 lb 8 oz (104.1 kg)         EF: 60%    Drop in Urinary Output no     Rhythm Changes Normal Sinus Rhythm 70's    Pacing Wires Removed:  [] Yes  [] NO  [] Platelets < 70,349  [] Arrhythmia  [] Bradycardia [] Valve Replacement  [] Pacing for Cardiac Index  []Physician Order    Lines/Drains  LDA Insertion Date Discontinued Date Dressing Changes   Art line 2/6/23 2/7/23    Central Line 2/6/23     Ramos 2/6/23 2/6/23    Chest Tube P: 2//6/23  M: 2/6/23 2/7/23    Wires       ETT      PAULA Drain      VasCath      Impella        Interventions After Office Hours  Problem(Brief) Date Time Intervention Physician contacted                                               Drip rates at handoff:    sodium chloride         Hospital Course:  POD# 1 2/7/23 Days         Lab Data:  CBC:   Recent Labs     02/06/23  1245 02/07/23  0500   WBC 14.1* 13.9*   HGB 14.9 13.9   HCT 44.6 42.7   MCV 95.3 94.5    147     BMP:    Recent Labs     02/07/23  0500   *   K 4.4   CO2 24   BUN 16   CREATININE 0.7*     LIVR:   Recent Labs     02/06/23  0610   AST 18   ALT <5*     PT/INR:   Recent Labs     02/06/23  0610   PROT 7.3     APTT: No results for input(s): APTT in the last 72 hours.   ABG:   Recent Labs     02/06/23  0828   PHART 7.355   [id number] 47.1*   PO2ART 310.9*    2

## 2023-02-07 NOTE — PROGRESS NOTES
Physician Progress Note      Melani George  CSN #:                  [id number]  :                       1960  ADMIT DATE:       2023 5:11 AM  DISCH DATE:  RESPONDING  PROVIDER #:        Di Martinez CNP          QUERY TEXT:    Patient admitted with atrial fibrillation and is maintained on Xarelto. If   possible, please document in progress notes and discharge summary if you are   evaluating and/or treating any of the following: The medical record reflects the following:  Risk Factors: HTN, KATELIN, Parkinson's  Clinical Indicators: Per progress note  \"Atrial fibrillation s/p ablation   of posterior wall with sub xiphoid window. Continue diltiazem and Xarelto\". Treatment: Xarelto, serial labs, supportive care. Thank you,  Javed Schulte RN BSN  Options provided:  -- Secondary hypercoagulable state in a patient with atrial fibrillation  -- Other - I will add my own diagnosis  -- Disagree - Not applicable / Not valid  -- Disagree - Clinically unable to determine / Unknown  -- Refer to Clinical Documentation Reviewer    PROVIDER RESPONSE TEXT:    This patient has secondary hypercoagulable state in a patient with atrial   fibrillation.     Query created by: Ata Park on 2023 2:12 PM      Electronically signed by:  Di Martinez CNP 2023 3:07 PM

## 2023-02-07 NOTE — CARE COORDINATION
Case Management Assessment  Initial Evaluation    Date/Time of Evaluation: 2/7/2023 12:51 PM  Assessment Completed by: Rodman Meckel, RN    If patient is discharged prior to next notation, then this note serves as note for discharge by case management. Patient Name: Kevin Fry                   YOB: 1960  Diagnosis: Atrial fibrillation, unspecified type [facility]) [I48.91]  Atrial fibrillation ([facility] [I48.91]                   Date / Time: 2/6/2023  5:11 AM    Patient Admission Status: Inpatient   Readmission Risk (Low < 19, Mod (19-27), High > 27): Readmission Risk Score: 7.9    Current PCP: Yue Ornelas MD  PCP verified by CM? Yes    Chart Reviewed: Yes      History Provided by: Patient  Patient Orientation: Alert and Oriented    Patient Cognition: Alert    Hospitalization in the last 30 days (Readmission):  No    If yes, Readmission Assessment in CM Navigator will be completed. Advance Directives:      Code Status: Full Code   Patient's Primary Decision Maker is: Legal Next of Kin    Primary Decision Maker: Danilo Rivera - Parent - [phone]    Secondary Decision Maker: Chalo Ferguson Child - [phone]    Discharge Planning:    Patient lives with: Parent (lives with his 79yo mother, and sig other Marcos Sanz will be helping pt initially) Type of Home: House  Primary Care Giver: Self  Patient Support Systems include: Spouse/Significant Other, Family Members   Current Financial resources: Medicare  Current community resources: None  Current services prior to admission: C-pap, Durable Medical Equipment            Current DME: [place]vel [place], Cpap, Walker            Type of Home Care services:  Nursing Services    ADLS  Prior functional level: Independent in ADLs/IADLs  Current functional level: Assistance with the following:, Mobility, Toileting    PT AM-PAC:   /24  OT AM-PAC: 19 /24    Family can provide assistance at DC:  Yes  Would you like Case Management to discuss the discharge plan with any other family members/significant others, and if so, who? Yes (\"you can\")  Plans to Return to Present Housing: Yes  Other Identified Issues/Barriers to RETURNING to current housing: none noted  Potential Assistance needed at discharge: 1 Amanda Pena (s/p VATS, will need at least skilled nurse [facility].)            Potential DME:    Patient expects to discharge to: [address] [place] [address] for transportation at discharge: Family    Financial    Payor: Flora Michael / Plan: Layton Coreas / Product Type: *No Product type* /     Does insurance require precert for SNF/IPR/LTACH: Yes    Potential assistance Purchasing Medications: No  Meds-to-Beds request: Yes      [address] [place] [address] [phone] [employer] [phone]  [address]  [place] [facility] [address]  Phone: [phone] Fax: [phone]    [place] [address] [phone] - F [phone]  [address]  Phone: [phone] Fax: [phone]    [facility]/pharmacy [address] [place] [address] [phone] - F [phone]  [address] [place] [address] Rd.   [place] [facility] [address]  Phone: [phone] Fax: [phone]    [facility] [address] [phone] [address] [phone]  [facility] [address]  Phone: [phone] Fax: [phone] [place] [address] [phone] [place] [phone]  [address] [place] [address]  Phone: [phone] Fax: [phone]      Notes:    Factors facilitating achievement of predicted outcomes: Family support, Motivated, Cooperative, Pleasant, Sense of humor, Has needed Durable Medical Equipment at home, and Home is wheelchair accessible    Barriers to discharge: None noted, PT eval pending    Additional Case Management Notes: Pt plans to return home at discharge tomorrow, agreeable to [facility] for skilled nurse, has no agency preference. Writer gave referral to [facility], chest tube removed. CM will continue to follow pt's progress and coordinate discharge arrangements as appropriate. The Plan for Transition of Care is related to the following treatment goals of Atrial fibrillation, unspecified type ([address] [I48.91]  Atrial fibrillation ([address] [A93.09]    IF APPLICABLE: The Patient and/or patient representative Deniz Gomez and his family were provided with a choice of provider and agrees with the discharge plan. Freedom of choice list with basic dialogue that supports the patient's individualized plan of care/goals and shares the quality data associated with the providers was provided to: Patient       The Patient and/or Patient Representative Agree with the Discharge Plan?  Yes    Renetta Cuadra RN  Case Management Department  Ph: [phone]

## 2023-02-07 NOTE — CARE COORDINATION
[facility]    Referral received from CM to follow for home care services. I will follow for needs, and speak with patient to verify demos.     Sp vats; no chest tube at [facility], BSN CTN  [facility] [phone]

## 2023-02-07 NOTE — PROGRESS NOTES
Occupational Therapy  Facility/Department: [facility] C2 CARD TELEMETRY  Occupational Therapy Initial Assessment and Daily Treatment Note    Name: Kenia Barrett  : 1960  MRN: [id number]  Date of Service: 2023    Discharge Recommendations:  24 hour supervision or assist      Patient Diagnosis(es): The encounter diagnosis was Atrial fibrillation, unspecified type ([address] Past Medical History:  has a past medical history of Anxiety, Atrial fibrillation ([address] Atrial flutter ([address] Cervical arthritis, CTS (carpal tunnel syndrome), Essential tremor, [place] (hard of hearing), Hypertension, KATELIN (obstructive sleep apnea), Osteoarthritis of back, Osteoarthritis of right hip, Parkinson's disease ([address] PONV (postoperative nausea and vomiting), S/P ablation of atrial fibrillation, Sinusitis, and Trigger finger. Past Surgical History:  has a past surgical history that includes Atrial ablation surgery (); Colonoscopy (10/29/2007); back surgery (); Toe Surgery; Salivary gland surgery; Carpal tunnel release (Right, 2013); Carpal tunnel release (Left, 12-10-13); Atrial ablation surgery (16); Cardioversion (16); Lumbar spine surgery (); Ankle surgery (); Total hip arthroplasty (Right, 2016); Ankle surgery (Left); and Colonoscopy (N/A, 2020). Assessment   Performance deficits / Impairments: Decreased functional mobility ; Decreased ADL status; Decreased endurance  Assessment: OT eval and tx completed. Pt admitted for afib and is s/p ablation of posterior wall with subxiphoid window and L VAT exclusion of L atrial appendage 23. Pt is independent at baseline, hx of Parkinsons disease. During OT session, he required CGA for standing balance and transfers with 4WW. Pt participated in LE dressing with min A. He plans to return home with 24/ support from girlfriend. Cont OT in acute care.   Prognosis: Good  Decision Making: Medium Complexity  REQUIRES OT FOLLOW-UP: Yes  Activity Tolerance  Activity Tolerance: Patient Tolerated treatment well      Plan   Occupational Therapy Plan  Times Per Week: 3-5x  Current Treatment Recommendations: Self-Care / ADL, Equipment evaluation, education, & procurement, Patient/Caregiver education & training, Functional mobility training, Balance training, Endurance training, Safety education & training     Restrictions  Restrictions/Precautions  Restrictions/Precautions: Fall Risk  Position Activity Restriction  Other position/activity restrictions: Ambulate pt, 2 chest tubes, R IJ    Subjective   General  Chart Reviewed: Yes  Patient assessed for rehabilitation services?: Yes  Additional Pertinent Hx: Parkinsons  Family / Caregiver Present: No  Referring Practitioner: ANDRA Sanchez  Diagnosis: afib s/p ABLATION OF POSTERIOR WALL WITH SUB XIPHOID WINDOW AND LEFT VIDEO ASSISTED THORACOSCOPIC EXCLUSION OF LEFT ATRIAL APPENDAGE, CRYO NERVE ABLATION AND INTERCOSTAL NERVE BLOCKS 2/6/23  Subjective  Subjective: Pt agreeable to OT. 5/10 chest pain, sharp at times. RN notified of request for pain med.   General Comment  Comments: RN approved therapy     Social/Functional History  Social/Functional History  Lives With: Significant other ([facility]  Type of Home: House  Home Layout: One level  Home Access: Stairs to enter with rails  Entrance Stairs - Number of Steps: 3  Entrance Stairs - Rails: Left  Bathroom Shower/Tub: Tub/Shower unit  Bathroom Toilet: Standard  Home Equipment: antonio Saenz  Has the patient had two or more falls in the past year or any fall with injury in the past year?: No  ADL Assistance: Independent  Homemaking Assistance: Independent  Ambulation Assistance: Independent (Uses cane as needed d/t past ankle injury)  Transfer Assistance: Independent  Active : Yes  Occupation: Retired  Type of Occupation:      Objective   Heart Rate: 87  Heart Rate Source: Monitor  BP: 124/62  BP Location: Arterial  BP Method: Automatic  Patient Position: Up in chair  MAP (Calculated): 83  Resp: 20  SpO2: 96 %  O2 Device: None (Room air)        Safety Devices  Type of Devices: Call light within reach;Gait belt;Nurse notified; Left in bed      AROM: Within functional limits  Strength: Within functional limits  Coordination: Within functional limits (Hx tremors d/t Parkinsons disease)  Tone: Normal  Sensation: Intact  ADL  Feeding: Independent  UE Dressing: Minimal assistance  UE Dressing Skilled Clinical Factors: gown  LE Dressing: Minimal assistance  LE Dressing Skilled Clinical Factors: for boxers  Toileting: Stand by assistance  Toileting Skilled Clinical Factors: urinal when seated      Bed mobility  Sit to Supine: Minimal assistance;2 Person assistance  Transfers  Stand Pivot Transfers: Contact guard assistance (3XM)  Sit to stand: Contact guard assistance  Stand to sit: Contact guard assistance  Transfer Comments: Pt ambulated in room and into hallway with CGA and 4WW. He took one standing rest break d/t feeling discomfort and was then able to ambulate to room. No further c/o pain. Vision  Vision: Impaired  Vision Exceptions: Wears glasses at all times  Hearing  Hearing: Within functional limits  Cognition  Overall Cognitive Status: WFL  Orientation  Overall Orientation Status: Within Normal Limits  Orientation Level: Oriented X4        Education Given To: Patient  Education Provided: Role of Therapy;Plan of Care;Transfer Training;ADL Adaptive Strategies; Equipment  Education Method: Verbal;Demonstration  Education Outcome: Verbalized understanding;Continued education needed   Disease Specific Education: Pt educated on importance of OOB mobility, prevention of complications of bedrest, and general safety during hospitalization.  Pt verbalized understanding    AM-PAC Score      AM-[facility] Inpatient Daily Activity Raw Score: 19 (02/07/23 0945)  AM-PAC Inpatient ADL T-Scale Score : 40.22 (02/07/23 0945)  ADL Inpatient CMS 0-100% Score: 42.8 (02/07/23 0945)  ADL Inpatient CMS G-Code Modifier : CK (02/07/23 0945)     Goals  Short Term Goals  Time Frame for Short Term Goals: 1 week (2/14) unless noted  Short Term Goal 1: Perform functional transfers with Supervision  Short Term Goal 2: Perform LE dressing with SBA  Short Term Goal 3: Stand at sink for 2-3 ADL tasks with SBA by 2/11  Patient Goals   Patient goals : \"Go home\"     Therapy Time   Individual Concurrent Group Co-treatment   Time In 0849         Time Out 0925         Minutes 36         Timed Code Treatment Minutes: 26 Minutes (10 min eval)   If pt is discharged prior to next OT session, this note will serve as the discharge summary.   Yessi Hutchins OT

## 2023-02-08 ENCOUNTER — APPOINTMENT (OUTPATIENT)
Dept: GENERAL RADIOLOGY | Age: 63
DRG: 229 | End: 2023-02-08
Attending: STUDENT IN AN ORGANIZED HEALTH CARE EDUCATION/TRAINING PROGRAM
Payer: MEDICARE

## 2023-02-08 VITALS
DIASTOLIC BLOOD PRESSURE: 74 MMHG | HEIGHT: 72 IN | SYSTOLIC BLOOD PRESSURE: 118 MMHG | TEMPERATURE: 98 F | RESPIRATION RATE: 23 BRPM | OXYGEN SATURATION: 92 % | BODY MASS INDEX: 30.97 KG/M2 | HEART RATE: 72 BPM | WEIGHT: 228.62 LBS

## 2023-02-08 PROBLEM — I48.91 ATRIAL FIBRILLATION (HCC): Status: RESOLVED | Noted: 2023-02-06 | Resolved: 2023-02-08

## 2023-02-08 LAB
ANION GAP SERPL CALCULATED.3IONS-SCNC: 9 MMOL/L (ref 3–16)
BUN BLDV-MCNC: 24 MG/DL (ref 7–20)
CALCIUM SERPL-MCNC: 9.6 MG/DL (ref 8.3–10.6)
CHLORIDE BLD-SCNC: 99 MMOL/L (ref 99–110)
CO2: 25 MMOL/L (ref 21–32)
CREAT SERPL-MCNC: 0.8 MG/DL (ref 0.8–1.3)
GFR SERPL CREATININE-BSD FRML MDRD: >60 ML/MIN/{1.73_M2}
GLUCOSE BLD-MCNC: 145 MG/DL (ref 70–99)
HCT VFR BLD CALC: 41.2 % (ref 40.5–52.5)
HEMOGLOBIN: 13.4 G/DL (ref 13.5–17.5)
MCH RBC QN AUTO: 31 PG (ref 26–34)
MCHC RBC AUTO-ENTMCNC: 32.5 G/DL (ref 31–36)
MCV RBC AUTO: 95.2 FL (ref 80–100)
PDW BLD-RTO: 14.9 % (ref 12.4–15.4)
PLATELET # BLD: 140 K/UL (ref 135–450)
PMV BLD AUTO: 10.6 FL (ref 5–10.5)
POTASSIUM SERPL-SCNC: 4.7 MMOL/L (ref 3.5–5.1)
RBC # BLD: 4.33 M/UL (ref 4.2–5.9)
SODIUM BLD-SCNC: 133 MMOL/L (ref 136–145)
WBC # BLD: 15.4 K/UL (ref 4–11)

## 2023-02-08 PROCEDURE — 6360000002 HC RX W HCPCS: Performed by: STUDENT IN AN ORGANIZED HEALTH CARE EDUCATION/TRAINING PROGRAM

## 2023-02-08 PROCEDURE — 94640 AIRWAY INHALATION TREATMENT: CPT

## 2023-02-08 PROCEDURE — 94669 MECHANICAL CHEST WALL OSCILL: CPT

## 2023-02-08 PROCEDURE — 6370000000 HC RX 637 (ALT 250 FOR IP): Performed by: STUDENT IN AN ORGANIZED HEALTH CARE EDUCATION/TRAINING PROGRAM

## 2023-02-08 PROCEDURE — 71045 X-RAY EXAM CHEST 1 VIEW: CPT

## 2023-02-08 PROCEDURE — 2580000003 HC RX 258: Performed by: STUDENT IN AN ORGANIZED HEALTH CARE EDUCATION/TRAINING PROGRAM

## 2023-02-08 PROCEDURE — 97530 THERAPEUTIC ACTIVITIES: CPT

## 2023-02-08 PROCEDURE — 80048 BASIC METABOLIC PNL TOTAL CA: CPT

## 2023-02-08 PROCEDURE — 85027 COMPLETE CBC AUTOMATED: CPT

## 2023-02-08 PROCEDURE — 6370000000 HC RX 637 (ALT 250 FOR IP): Performed by: NURSE PRACTITIONER

## 2023-02-08 PROCEDURE — 6360000002 HC RX W HCPCS: Performed by: NURSE PRACTITIONER

## 2023-02-08 PROCEDURE — 97116 GAIT TRAINING THERAPY: CPT

## 2023-02-08 PROCEDURE — 99024 POSTOP FOLLOW-UP VISIT: CPT | Performed by: NURSE PRACTITIONER

## 2023-02-08 RX ORDER — METHYLPREDNISOLONE 4 MG/1
4 TABLET ORAL
Qty: 3 TABLET | Refills: 0 | Status: SHIPPED | OUTPATIENT
Start: 2023-02-09 | End: 2023-02-12

## 2023-02-08 RX ORDER — METHYLPREDNISOLONE 4 MG/1
4 TABLET ORAL
Qty: 2 TABLET | Refills: 0 | Status: SHIPPED | OUTPATIENT
Start: 2023-02-08 | End: 2023-02-10

## 2023-02-08 RX ORDER — OXYCODONE HYDROCHLORIDE 5 MG/1
5 TABLET ORAL EVERY 6 HOURS PRN
Qty: 20 TABLET | Refills: 0 | Status: SHIPPED | OUTPATIENT
Start: 2023-02-08 | End: 2023-02-13

## 2023-02-08 RX ORDER — METHYLPREDNISOLONE 4 MG/1
4 TABLET ORAL NIGHTLY
Qty: 3 TABLET | Refills: 0 | Status: SHIPPED | OUTPATIENT
Start: 2023-02-08 | End: 2023-02-11

## 2023-02-08 RX ORDER — COLCHICINE 0.6 MG/1
0.6 TABLET ORAL 2 TIMES DAILY
Qty: 28 TABLET | Refills: 0 | Status: SHIPPED | OUTPATIENT
Start: 2023-02-17 | End: 2023-03-03

## 2023-02-08 RX ORDER — FAMOTIDINE 20 MG/1
20 TABLET, FILM COATED ORAL DAILY
Qty: 30 TABLET | Refills: 0 | Status: SHIPPED | OUTPATIENT
Start: 2023-02-08 | End: 2023-03-10

## 2023-02-08 RX ORDER — COLCHICINE 0.6 MG/1
0.6 TABLET ORAL DAILY
Qty: 7 TABLET | Refills: 0 | Status: SHIPPED | OUTPATIENT
Start: 2023-02-09 | End: 2023-02-16

## 2023-02-08 RX ORDER — METHYLPREDNISOLONE 4 MG/1
4 TABLET ORAL
Qty: 1 TABLET | Refills: 0 | Status: SHIPPED | OUTPATIENT
Start: 2023-02-08 | End: 2023-02-09

## 2023-02-08 RX ORDER — POLYETHYLENE GLYCOL 3350 17 G/17G
17 POWDER, FOR SOLUTION ORAL DAILY PRN
Qty: 7 EACH | Refills: 0 | Status: SHIPPED | OUTPATIENT
Start: 2023-02-08 | End: 2023-02-15

## 2023-02-08 RX ORDER — METHOCARBAMOL 750 MG/1
750 TABLET, FILM COATED ORAL 4 TIMES DAILY PRN
Qty: 56 TABLET | Refills: 0 | Status: SHIPPED | OUTPATIENT
Start: 2023-02-08 | End: 2023-02-22

## 2023-02-08 RX ADMIN — ALBUTEROL SULFATE 2.5 MG: 2.5 SOLUTION RESPIRATORY (INHALATION) at 08:12

## 2023-02-08 RX ADMIN — POLYETHYLENE GLYCOL 3350 17 G: 17 POWDER, FOR SOLUTION ORAL at 08:55

## 2023-02-08 RX ADMIN — METHOCARBAMOL TABLETS 750 MG: 750 TABLET, COATED ORAL at 08:52

## 2023-02-08 RX ADMIN — DILTIAZEM HYDROCHLORIDE 180 MG: 180 CAPSULE, COATED, EXTENDED RELEASE ORAL at 08:52

## 2023-02-08 RX ADMIN — OXYCODONE 5 MG: 5 TABLET ORAL at 08:56

## 2023-02-08 RX ADMIN — RIVAROXABAN 20 MG: 20 TABLET, FILM COATED ORAL at 08:52

## 2023-02-08 RX ADMIN — CARBIDOPA AND LEVODOPA 3 TABLET: 25; 100 TABLET ORAL at 05:56

## 2023-02-08 RX ADMIN — ACETAMINOPHEN 325MG 650 MG: 325 TABLET ORAL at 13:21

## 2023-02-08 RX ADMIN — CARBIDOPA AND LEVODOPA 3 TABLET: 25; 100 TABLET ORAL at 13:22

## 2023-02-08 RX ADMIN — KETOROLAC TROMETHAMINE 15 MG: 30 INJECTION, SOLUTION INTRAMUSCULAR; INTRAVENOUS at 05:56

## 2023-02-08 RX ADMIN — OXYCODONE 5 MG: 5 TABLET ORAL at 13:21

## 2023-02-08 RX ADMIN — METHOCARBAMOL TABLETS 750 MG: 750 TABLET, COATED ORAL at 13:22

## 2023-02-08 RX ADMIN — FAMOTIDINE 20 MG: 20 TABLET, FILM COATED ORAL at 08:52

## 2023-02-08 RX ADMIN — METHYLPREDNISOLONE 4 MG: 4 TABLET ORAL at 05:56

## 2023-02-08 RX ADMIN — Medication 10 ML: at 08:52

## 2023-02-08 RX ADMIN — ACETAMINOPHEN 325MG 650 MG: 325 TABLET ORAL at 05:56

## 2023-02-08 RX ADMIN — CARBIDOPA AND LEVODOPA 3 TABLET: 25; 100 TABLET ORAL at 08:52

## 2023-02-08 RX ADMIN — COLCHICINE 0.6 MG: 0.6 TABLET, FILM COATED ORAL at 08:52

## 2023-02-08 ASSESSMENT — PAIN SCALES - GENERAL
PAINLEVEL_OUTOF10: 5
PAINLEVEL_OUTOF10: 6

## 2023-02-08 ASSESSMENT — PAIN DESCRIPTION - LOCATION: LOCATION: SHOULDER

## 2023-02-08 NOTE — DISCHARGE INSTRUCTIONS
Chest Tube Insertion Site:   Leave dressing on until tomorrow evening. You may remove it at that time and clean the site daily with antibacterial soap. You may take a shower after the dressing is removed. Surgical Procedure Site:   You may leave your old incision site open to air after discharge home. Clean the site daily with antibacterial soap. All Surgical/Chest Tube Sites:   -Once you remove your old chest tube dressing, you may apply a band-aid as needed for any spotting and/or drainage. A dressing is not needed and please do not use creams, lotions, or Neosporin (antibiotic cream) on or around surgical and old chest tube sites.    -Do not soak in a bath tub and do not let water beat on your incisions during a shower.    -Do not lift anything greater than 10 pounds for 2 weeks while incision heals. Special Instructions:   -Continue to use your incentive spirometer every 1-2 hours while you are awake at home to keep your lungs inflated and to prevent the development of pneumonia. Continue to use your incentive spirometer until your next appointment with Dr. Lisa Sorenson and you are instructed to do otherwise by your surgeon.   -Call Dr. Preet Hallman office if any of your incisions appear red, if they are draining any fluid that appears like pus, if you are running a high grade fever (greater than 101.5 degrees), or if you have any questions about your surgery or incisions at any time. Follow up with Dr. Lisa Sorenson on 2/23 at 9:00 AM so that you can be evaluated after your surgical procedure. Please call the Cardiac, Vascular, & Thoracic Surgeons' Office at [phone] with any questions or concerns after you are discharged.

## 2023-02-08 NOTE — CARE COORDINATION
Marilyn/CTSurg NP updated writer, plan for discharge today after chest tube removal, pt would benefit from home health nurse. Yesterday pt agreed to have nurse at discharge, [facility] following, writer updated [facility] liaison.   SULLY Lewis-RN

## 2023-02-08 NOTE — PROGRESS NOTES
CVTS Cardiothoracic Progress Note:                                CC:  Post op follow up     Surgery: 2/6/23 ABLATION OF POSTERIOR WALL WITH SUB XIPHOID WINDOW AND LEFT VIDEO ASSISTED THORACOSCOPIC EXCLUSION OF LEFT ATRIAL APPENDAGE, CRYO NERVE ABLATION AND INTERCOSTAL NERVE [facility] course:  2/7 Pt reports issues with pain overnight but much improved this morning. No acute events. Hemodynamically stable in SR.   2/8 no acute events overnight. Overall good pain control. Remains in sinus rhythm. Progressing well.     Past Medical History:   Diagnosis Date    Anxiety     Atrial fibrillation [facility])     Atrial flutter ([facility] [address]     Cervical arthritis 03/30/2012    CTS (carpal tunnel syndrome) 03/13/2013    Essential tremor 2017    [place] (hard of hearing)     Hypertension     KATELIN (obstructive sleep apnea)     uses CPAP    Osteoarthritis of back 11/06/2014    Osteoarthritis of right hip 11/06/2014    Parkinson's disease ([facility] [address] 08/19/2020    PONV (postoperative nausea and vomiting)     S/P ablation of atrial fibrillation 02/25/2016    Radiofrequency ablation of atrial fibrillation and pulmonary veins isolation Additional ablation of complex atrial fractionated electrogram     Sinusitis     Trigger finger 12/10/2014        Past Surgical History:   Procedure Laterality Date    ANKLE SURGERY  2015    ANKLE SURGERY Left     ATRIAL [address]    ATRIAL ABLATION SURGERY  2/25/16    RFCA of AF and PVI, additional ablation complex atrial fractioned electrogram    BACK SURGERY  1984    lumbar patino    CARDIAC SURGERY N/A 2/6/2023    ABLATION OF POSTERIOR WALL WITH SUB XIPHOID WINDOW AND LEFT VIDEO ASSISTED THORACOSCOPIC EXCLUSION OF LEFT ATRIAL APPENDAGE, CRYO NERVE ABLATION AND INTERCOSTAL NERVE BLOCKS performed by Murray Marcano MD at [place]  4/6/16    CARPAL TUNNEL RELEASE Right 11/19/2013    RIGHT CARPAL TUNNEL RELEASE      CARPAL TUNNEL RELEASE Left 12-10-13     CARPAL TUNNEL RELEASE COLONOSCOPY  10/29/2007    COLONOSCOPY N/A 7/30/2020    COLONOSCOPY POLYPECTOMY SNARE/COLD BIOPSY performed by Myah Mahoney MD at [facility] SURGERY      TOTAL HIP ARTHROPLASTY Right 2016    right hip replacement        Allergies as of 01/25/2023    (No Known Allergies)        Patient Active Problem List   Diagnosis    Essential hypertension    KATELIN (obstructive sleep apnea)    PAF (paroxysmal atrial fibrillation) (HCC)    Disabling essential tremor    Adjustment disorder with anxious mood    Moderate episode of recurrent major depressive disorder (HCC)    Obesity (BMI 30.0-34. 9)    Parkinson's disease ([address]    Syncope and collapse    Atrial tachycardia (HCC)    Atrial fibrillation (HCC)        Vital Signs: /69   Pulse 71   Temp 98 °F (36.7 °C) (Oral)   Resp 18   Ht 6' (1.829 m)   Wt 228 lb 9.9 oz (103.7 kg)   SpO2 96%   BMI 31.01 kg/m²  O2 Flow Rate (L/min): (S) 1 L/min     Admission Weight: Weight: 230 lb (104.3 kg)    Weight on 2/6 103.3 kg (Pre-op)   2/7 104.1 kg   2/8 103.7 kg    Intake/Output:   Intake/Output Summary (Last 24 hours) at 2/8/2023 0835  Last data filed at 2/8/2023 0600  Gross per 24 hour   Intake 780 ml   Output 1820 ml   Net -1040 ml        LABORATORY DATA:     CBC:   Recent Labs     02/06/23  1245 02/07/23  0500 02/08/23  0315   WBC 14.1* 13.9* 15.4*   HGB 14.9 13.9 13.4*   HCT 44.6 42.7 41.2   MCV 95.3 94.5 95.2    147 140     BMP:   Recent Labs     02/07/23  0500 02/08/23  0315   * 133*   K 4.4 4.7    99   CO2 24 25   PHOS 3.0  --    BUN 16 24*   CREATININE 0.7* 0.8     MG:    Recent Labs     02/07/23  0500   MG 1.90      CXR: 2/8/23  FINDINGS:   A single frontal view of the chest was performed. There is no acute skeletal   abnormality. There is a stable right internal jugular vascular sheath in   place with tip overlying the upper SVC.   There is a stable left atrial appendage exclusion device in place. The heart size is mildly enlarged,   though stable. The mediastinal contours are stable and within normal limits. There is a stable left apical chest tube without current evidence of a   pneumothorax. There is stable pleural and parenchymal disease within the   left lung base, likely a small left pleural effusion and left basilar   consolidation. Right basilar airspace disease is also stable and unchanged. Impression   1. Stable position of left apical chest tube without current evidence of a   pneumothorax. 2. No significant change in appearance of a small left pleural effusion and   bibasilar airspace disease. 3. Stable cardiomegaly. ________________________________________________________________________    Subjective:   Dietary Intake: improving    no Nausea   Pain Control: adequate  Complaints: None  Bowels: no BM     Objective:   General appearance: sitting up in chair, in nad   Lungs: diminished bilateral bases   Heart: S1S2 normal; SR on monitor  Chest: symmetrical expansion with inspiration and expirations  Abdomen: soft, non-tender  Bowel sounds: normoactive   Kidneys: UOP 2000 ml over 24 hours; Cr 0.8  Wound/Incisions: Subxiphoid incision CDI. Mediastinal chest tube site with dressing CDI  Extremities: BLE pulses present; no swelling noted in BLE  Neurological: alert, oriented and grossly non-focal   Chest tubes/Drains: left pleural chest tube with 20-20-40=80 mL of serosanguineous drainage over 24 hours; no airleak noted    Assessment:   Post-op: 2 days. Condition: In stable condition. Plan:   1. Cardiovascular:   Atrial fibrillation s/p ablation of posterior wall with sub xiphoid window and left video assisted thoracoscopic exclusion of left atrial appendage, cryo nerve ablation and ICNB (2/9)- remains in SR since surgery. Continue diltiazem and xarelto   Medrol taper, colchicine   HTN- controlled     2.  Pulmonary:   Satisfactory oxygen saturations on RA  Expansion measures   KATELIN- compliant with CPAP     3. Neurology:   Post op pain control with scheduled tylenol, toradol, robaxin and PRN oxy or morphine   Parkinson's disease- continue sinemet     4. Nephrology:   Satisfactory 24 hour UOP with Cr of 0.8    5. Endocrinology:   Glucose controlled   No hx of DM, A1C 5.7 on 1/27/23    6. Hematology:   No issues at present, H&H stable at 13.9/42.7    7. Microbiology:   Leukocytosis- WBC 15.4. Afebrile. Likely reactive. Monitor     8. GI:  Daily miralax     9. Nutrition:   Continue regular diet as ordered    10. Labs:   Labs and imaging reviewed as above     11. Post-op Drains/Wires:   Remove left pleural tube this morning    12. D/C plan: CM following. Anticipate discharge home later today    13.  Continue post-op care of patient in the ICU    GI prophylaxis: pepcid  DVT prophylaxis: xarelto, SCDs  ________________________________________________________________________  EMILIA Almeida - CNP  2/8/2023  8:35 AM

## 2023-02-08 NOTE — CARE COORDINATION
[facility]    DC order noted, all docs needed have been faxed to [facility] for home care services.     Home care to see patient within 24-48 hrs    Lakesha Salguero RN, BSN CTN  [facility] [phone]

## 2023-02-08 NOTE — CARE COORDINATION
CASE MANAGEMENT DISCHARGE SUMMARY    Discharge to: Home with [facility] for nurse    IMM given: 2/6     New Durable Medical Equipment ordered/agency: none new recommended    Transportation:    Family/car: yes    Confirmed discharge plan with:   Patient: yes     Facility/Agency, name:  [facility] will pull directly from [address], name: Rakan Casillas    Note: Discharging nurse to complete JOJO, reconcile AVS, and place final copy with patient's discharge packet.   SULLY Graves-RN

## 2023-02-08 NOTE — DISCHARGE INSTR - COC
Continuity of Care Form    Patient Name: Rodolfo Terrell   :  1960  MRN:  [id number]    Admit date:  2023  Discharge date:  23    Code Status Order: Full Code   Advance Directives:   Advance Care Flowsheet Documentation       Date/Time Healthcare Directive Type of Healthcare Directive Copy in [address] Agent's Name Healthcare Agent's Phone Number    23 [phone] No, patient does not have an advance directive for healthcare treatment -- -- -- -- --            Admitting Physician:  Domenic Dakin, MD  PCP: Michael Wells MD    Discharging Nurse: [facility] Unit/Room#: 9724/9654-03  Discharging Unit Phone Number: ***    Emergency Contact:   Extended Emergency Contact Information  Primary Emergency Contact: [facility] Phone: [phone]  Relation: Domestic Partner  Secondary Emergency Contact: [facility]  Mobile Phone: [phone]  Relation: Child    Past Surgical History:  Past Surgical History:   Procedure Laterality Date    ANKLE SURGERY      ANKLE SURGERY Left     ATRIAL [place]  16    RFCA of AF and PVI, additional ablation complex atrial fractioned electrogram    BACK SURGERY  1984    lumbar patino    CARDIAC SURGERY N/A 2023    ABLATION OF POSTERIOR WALL WITH SUB XIPHOID WINDOW AND LEFT VIDEO ASSISTED THORACOSCOPIC EXCLUSION OF LEFT ATRIAL APPENDAGE, CRYO NERVE ABLATION AND INTERCOSTAL NERVE BLOCKS performed by Domenic Dakin, MD at [place]  16    CARPAL TUNNEL RELEASE Right 2013    RIGHT CARPAL TUNNEL RELEASE      CARPAL TUNNEL RELEASE Left -10-     CARPAL TUNNEL RELEASE                COLONOSCOPY  10/29/2007    COLONOSCOPY N/A 2020    COLONOSCOPY POLYPECTOMY SNARE/COLD BIOPSY performed by Lenora Roberto MD at [place] [address] HIP ARTHROPLASTY Right 2016    right hip replacement       Immunization History:   Immunization History   Administered Date(s) Administered    Influenza Vaccine, unspecified formulation 2018    Influenza, FLUARIX, FLULAVAL, FLUZONE (age 10 mo+) AND AFLURIA, (age 1 y+), PF, 0.5mL 2022    Influenza, FLUCELVAX, (age 10 mo+), MDCK, PF, 0.5mL 2018, 10/07/2020    Tdap (Boostrix, Adacel) 2013       Active Problems:  Patient Active Problem List   Diagnosis Code    Essential hypertension I10    KATELIN (obstructive sleep apnea) G47.33    PAF (paroxysmal atrial fibrillation) ([facility]) I48.0    Disabling essential tremor G25.0    Adjustment disorder with anxious mood F43.22    Moderate episode of recurrent major depressive disorder ([facility] [address] F33.1    Obesity (BMI 30.0-34. 9) E66.9    Parkinson's disease ([facility] [address] G20    Syncope and collapse R55    Atrial tachycardia ([facility] [address] I47.1    Atrial fibrillation ([facility]) I48.91       Isolation/Infection:   Isolation            No Isolation          Patient Infection Status       Infection Onset Added Last Indicated Last Indicated By Review Planned Expiration Resolved Resolved By    None active    Resolved    COVID-19 (Rule Out) 22 COVID-19 (Ordered)   22 Rule-Out Test Resulted    COVID-19 (Rule Out) 21 COVID-19 (Ordered)   21 Rule-Out Test Resulted    COVID-19 20 COVID-19   12/15/20             Nurse Assessment:  Last Vital Signs: /74   Pulse 74   Temp 98 °F (36.7 °C) (Oral)   Resp 18   Ht 6' (1.829 m)   Wt 228 lb 9.9 oz (103.7 kg)   SpO2 97%   BMI 31.01 kg/m²     Last documented pain score (0-10 scale): Pain Level: 5  Last Weight:   Wt Readings from Last 1 Encounters:   23 228 lb 9.9 oz (103.7 kg)     Mental Status:  {IP PT MENTAL STATUS:}    IV Access:  {MH JOJO IV ACCESS:339959607}    Nursing Mobility/ADLs:  Walking   {HIMANSHU BALBUENA YBNR:283436413}  Transfer  {HIMANSHU BALBUENA AEYD:970426006}  Bathing  {CHP DME QULR:152480757}  Dressing  {CHP DME MBPH:438793879}  Toileting  {CHP DME VXWI:650817694}  Feeding  {CHP DME KGDV:346171849}  Med Admin  {CHP DME WJHQ:730194800}  Med Delivery   { JOJO MED Delivery:111436674}    Wound Care Documentation and Therapy:  Incision 02/06/23 Chest Left (Active)   Dressing Status Clean;Dry; Intact 02/07/23 2000   Dressing Change Due 02/07/23 02/07/23 2000   Dressing/Treatment Dry dressing 02/07/23 2000   Drainage Amount None 02/07/23 2000   Odor None 02/07/23 2000   Tessa-incision Assessment Other (Comment) 02/07/23 2000   Number of days: 2       Incision 02/06/23 Groin Right (Active)   Dressing Status Clean;Dry; Intact 02/07/23 2000   Dressing/Treatment Open to air 02/07/23 2000   Number of days: 2       Incision 02/06/23 Groin Left (Active)   Dressing Status Clean;Dry; Intact 02/07/23 2000   Dressing/Treatment Open to air 02/07/23 2000   Number of days: 2        Elimination:  Continence: Bowel: {YES / AI:36217}  Bladder: {YES / DZ:99879}  Urinary Catheter: {Urinary Catheter:638444082}   Colostomy/Ileostomy/Ileal Conduit: {YES / CP:74607}       Date of Last BM: ***    Intake/Output Summary (Last 24 hours) at 2/8/2023 1113  Last data filed at 2/8/2023 0600  Gross per 24 hour   Intake 480 ml   Output 1270 ml   Net -790 ml     I/O last 3 completed shifts:   In: 1277.2 [P.O.:780; I.V.:497.2]  Out: 5042 [Urine:2525; Chest Tube:305]    Safety Concerns:     [address] [facility] Safety Concerns:247787804}    Impairments/Disabilities:      [address] [facility] Impairments/Disabilities:644218901}    Nutrition Therapy:  Current Nutrition Therapy:   [address] [facility] Diet List:699065823}    Routes of Feeding: {CHP DME Other Feedings:342209408}  Liquids: {Slp liquid thickness:01275}  Daily Fluid Restriction: {CHP DME Yes amt example:364912572}  Last Modified Barium Swallow with Video (Video Swallowing Test): {Done Not Done SBRB:240014890}    Treatments at the Time of Hospital Discharge:   Respiratory Treatments: ***  Oxygen Therapy:  {Therapy; copd oxygen:95014}  Ventilator:    {MH CC Vent PAJO:945955845}    Rehab Therapies: Nurse; HRS if patient agreeable  Weight Bearing Status/Restrictions: [address] [place] CC Weight Bearin}  Other Medical Equipment (for information only, NOT a DME order):  {EQUIPMENT:875113069}  Other Treatments: HOME HEALTH CARE: LEVEL 3 [address] to establish plan of care for patient over 60 day period   Nursing  Initial home SN evaluation visit to occur within 24-48 hours for:  1)  medication management  2)  VS and clinical assessment  3)  S&S chronic disease exacerbation education + when to contact MD/NP  4)  care coordination  Medication Reconciliation during 1st SN visit  PT/OT/Speech   Evaluations in home within 24-48 hours of discharge to include DME and home safety   Frontload therapy 5 days, then 3x a week   OT to evaluate if patient has [address] needs for personal care    evaluation within 24-48 hours to evaluate resources & insurance for potential AL, IL, LTC, and Medicaid options   Palliative Care referral within 5 days of hospital discharge   PCP Visit scheduled within 3 - 7 days of hospital discharge    [address] (If patient is agreeable and meets guidelines)      Patient's personal belongings (please select all that are sent with patient):  {CHP DME Belongings:976604387}    RN SIGNATURE:  {Esignature:487076832}    CASE MANAGEMENT/SOCIAL WORK SECTION    Inpatient Status Date: 23    Readmission Risk Assessment Score:  Readmission Risk              Risk of Unplanned Readmission:  11           Discharging to Facility/ [address]   [address] [place] [place] [address] [address] [place] [address]   [phone]     / signature: Electronically signed by Meeta Bowers RN on 23 at 11:39 AM EST    PHYSICIAN SECTION    Prognosis: Good    Condition at Discharge: Stable    Rehab Potential (if transferring to Rehab): Good    Recommended Labs or Other Treatments After Discharge: incision site care, VS monitoring     Physician Certification: I certify the above information and transfer of Karri Rivera  is necessary for the continuing treatment of the diagnosis listed and that he requires Home Care for less 30 days.     Update Admission H&P: No change in H&P    PHYSICIAN SIGNATURE:  Electronically signed by EMILIA Galeas CNP on 2/8/23 at 11:13 AM EST

## 2023-02-08 NOTE — DISCHARGE SUMMARY
Cardiac, Vascular & Thoracic Surgery  Discharge Summary    Patient:  Kenia Barrett 1960 [id number]   Admission Date:  2/6/2023  5:11 AM  Discharge Date:  02/08/23     Principle Diagnosis:  Atrial fibrillation [facility])    Secondary Diagnosis:  Principal Problem (Resolved):    Atrial fibrillation ([facility] [address]  Active Problems:    * No active hospital problems. *    Cardiac Cath: 1/12/23  Anatomy:   LM-nml   LAD-prox 40%  Cx-nml  OM- nml  RCA-dom prox 20%  RPDA- nml  LVEF- 60%  LVG- nml  LVEDP- 5    Procedure:  2/6/23 ABLATION OF POSTERIOR WALL WITH SUB XIPHOID WINDOW AND LEFT VIDEO ASSISTED THORACOSCOPIC EXCLUSION OF LEFT ATRIAL APPENDAGE, CRYO NERVE ABLATION AND INTERCOSTAL NERVE BLOCKS    History:  Mr. Froylan Mart pleasant man with early onset parkinsons but still functional. Long history of afib with an a flutter ablation in the 1990s at [facility]. In February 2016 he had PVI and CAFE with restoration of sinus rhythm. He was taken off propafenone and cardioverted in April 2016. He recurred with A. fib in 2020 and had a PVI and roofline in 2021. He was in sinus for most of 2021 however by September 2022 he had 42% A. fib burden. He is utilizing his CPAP machine more regularly. He is currently taking diltiazem and Xarelto. His brother had a transthoracic maze [facility] recently and this made him interested in surgical afibrillation. 1/23/23 Mr. Froylan Mart returns after preop testing. He has symptomatic afib. He would like to proceed with surgery     Hospital Course: The patient underwent ablation of posterior wall with subxiphoid window and left video assisted thoracoscopic exclusion of left atrial appendage, with cry nerve ablation and ICNB on 2/6. The patient progressed well postoperatively. He remained in SR during his postoperative course. He was started on a medrol taper and colchicine. His xarelto, metoprolol and diltiazem have been resumed.  The patient was discharged on 02/08/23 with follow up scheduled for 2/23 with Dr. Davey Montemayor. Discharged Condition: stable    Disposition:  home    Discharge Medications:     Medication List        START taking these medications      * colchicine 0.6 MG tablet  Commonly known as: COLCRYS  Take 1 tablet by mouth daily for 7 days  Start taking on: February 9, 2023     * colchicine 0.6 MG tablet  Commonly known as: Colcrys  Take 1 tablet by mouth 2 times daily for 14 days Start this on 2/17 after you have completed 1 week of colchicine 0.6 mg daily  Start taking on: February 17, 2023     famotidine 20 MG tablet  Commonly known as: PEPCID  Take 1 tablet by mouth daily     methocarbamol 750 MG tablet  Commonly known as: ROBAXIN  Take 1 tablet by mouth 4 times daily as needed (pain)     * methylPREDNISolone 4 MG tablet  Commonly known as: MEDROL  Take 1 tablet by mouth Daily with lunch for 2 doses     * methylPREDNISolone 4 MG tablet  Commonly known as: MEDROL  Take 1 tablet by mouth Daily with supper for 1 dose     * methylPREDNISolone 4 MG tablet  Commonly known as: MEDROL  Take 1 tablet by mouth nightly for 3 doses     * methylPREDNISolone 4 MG tablet  Commonly known as: MEDROL  Take 1 tablet by mouth every morning (before breakfast) for 3 doses  Start taking on: February 9, 2023     oxyCODONE 5 MG immediate release tablet  Commonly known as: ROXICODONE  Take 1 tablet by mouth every 6 hours as needed for Pain for up to 5 days. Max Daily Amount: 20 mg     polyethylene glycol 17 g packet  Commonly known as: GLYCOLAX  Take 17 g by mouth daily as needed for Constipation           * This list has 6 medication(s) that are the same as other medications prescribed for you. Read the directions carefully, and ask your doctor or other care provider to review them with you.                 CONTINUE taking these medications      * carbidopa-levodopa  MG per tablet  Commonly known as: SINEMET     * carbidopa-levodopa  MG per extended release tablet  Commonly known as: SINEMET CR     clonazePAM 0.5 MG tablet  Commonly known as: KLONOPIN     dilTIAZem 180 MG extended release capsule  Commonly known as: CARDIZEM CD  TAKE 1 CAPSULE BY MOUTH DAILY     fexofenadine 180 MG tablet  Commonly known as: ALLEGRA     fluticasone 50 MCG/ACT nasal spray  Commonly known as: FLONASE     folic acid 1 MG tablet  Commonly known as: FOLVITE     metoprolol 100 MG tablet  Commonly known as: LOPRESSOR     rivaroxaban 20 MG Tabs tablet  Commonly known as: Xarelto  Take 1 tablet by mouth daily (with breakfast)     vitamin B-12 1000 MCG tablet  Commonly known as: CYANOCOBALAMIN           * This list has 2 medication(s) that are the same as other medications prescribed for you. Read the directions carefully, and ask your doctor or other care provider to review them with you. STOP taking these medications      Bactroban Nasal 2 % nasal ointment  Generic drug: mupirocin               Where to Get Your Medications        You can get these medications from any pharmacy    Bring a paper prescription for each of these medications  colchicine 0.6 MG tablet  colchicine 0.6 MG tablet  famotidine 20 MG tablet  methocarbamol 750 MG tablet  methylPREDNISolone 4 MG tablet  methylPREDNISolone 4 MG tablet  methylPREDNISolone 4 MG tablet  methylPREDNISolone 4 MG tablet  oxyCODONE 5 MG immediate release tablet  polyethylene glycol 17 g packet        Follow up with Cardiothoracic Surgeon, Dr. Liyah Torres, on 2/23. Follow up with Electrophysiologist, Dr. Clay Cobb, as directed.      EMILIA Vargas - CNP

## 2023-02-08 NOTE — PROGRESS NOTES
Physical Therapy  Facility/Department: [facility] C2 CARD TELEMETRY  Daily Treatment Note  NAME: Fatemeh Beckett  : 1960  MRN: [id number]    Date of Service: 2023    Discharge Recommendations:  24 hour supervision or assist, Home with Home health PT   PT Equipment Recommendations  Equipment Needed: No  Other: pt owns equipment    Patient Diagnosis(es): The primary encounter diagnosis was Longstanding persistent atrial fibrillation ([facility] [address] A diagnosis of Atrial fibrillation, unspecified type [facility]) was also pertinent to this visit. Assessment   Assessment: Pt tolerated treatment session well this date. Pt able to ambulate ~400 ft with 4WW and SBA with no rest break as well as ~50 ft with no AD and SBA. Pt also progressed to supv for transfers and bed mobility. Pt progressing well and would continue to benefit from skilled therapy to improve activity tolerance and balance with gait. Continue to recommend home with initial 24/7 A at d/c. Activity Tolerance: Patient tolerated treatment well;Patient limited by endurance  Equipment Needed: No  Other: pt owns equipment     Plan    Physcial Therapy Plan  General Plan: 5-7 times per week  Specific Instructions for Next Treatment: progress functional mobility as tolerated  Current Treatment Recommendations: Strengthening;Balance training;Functional mobility training;Transfer training;ROM;Gait training; Endurance training;Stair training;Home exercise program;Safety education & training;Equipment evaluation, education, & procurement; Therapeutic activities; Neuromuscular re-education     Restrictions  Restrictions/Precautions  Restrictions/Precautions: Fall Risk, Up as Tolerated  Position Activity Restriction  Other position/activity restrictions: Ambulate pt, 1 chest tubes     Subjective    Subjective  Subjective: pt found in chair, agreeable to therapy  Pain: denies pain at this time  Orientation  Overall Orientation Status: Within Normal Limits  Orientation Level: Oriented X4     Objective   Vitals  Heart Rate: 74  Heart Rate Source: Monitor  BP: 118/74  BP Location: Right upper arm  BP Method: Automatic  Patient Position: Up in chair  MAP (Calculated): 89  SpO2: 97 %  O2 Device: None (Room air)  Bed Mobility Training  Bed Mobility Training: Yes  Overall Level of Assistance: Supervision  Interventions: Verbal cues; Safety awareness training  Rolling: Modified independent (with bed rails)  Supine to Sit: Other (comment) (found in chair and returned to bed, not assessed)  Sit to Supine: Supervision  Scooting: Supervision  Balance  Sitting: Intact  Standing: Impaired  Standing - Static: Good (with 4WW)  Standing - Dynamic: Fair;Good (good with 4WW and good-fair with no AD)  Transfer Training  Transfer Training: Yes  Overall Level of Assistance: Additional time;Stand-by assistance  Interventions: Verbal cues; Visual cues; Safety awareness training  Sit to Stand: Supervision  Stand to Sit: Supervision  Gait Training  Gait Training: Yes  Gait  Overall Level of Assistance: Adaptive equipment; Additional time;Stand-by assistance  Interventions: Safety awareness training;Visual cues; Verbal cues  Base of Support: Narrowed  Speed/Court: Slow;Pace decreased (< 100 feet/min)  Step Length: Right shortened;Left shortened  Gait Abnormalities: Decreased step clearance; Other (comment) (forward flexed posture)  Distance (ft): 400 Feet ((400 ft with 4WW, + 50 ft with no AD))  Assistive Device: Walker, rollator;Gait belt; Other (comment) (no AD for second bout)           Safety Devices  Type of Devices: Call light within reach;Gait belt;Nurse notified; Left in bed  Restraints  Restraints Initially in Place: No     [facility] 6 Clicks Inpatient Mobility:  AM-PAC Basic Mobility - Inpatient   How much help is needed turning from your back to your side while in a flat bed without using bedrails?: None  How much help is needed moving from lying on your back to sitting on the side of a flat bed without using bedrails?: None  How much help is needed moving to and from a bed to a chair?: None  How much help is needed standing up from a chair using your arms?: None  How much help is needed walking in hospital room?: A Little  How much help is needed climbing 3-5 steps with a railing?: A Little  AM-[facility] Inpatient Mobility Raw Score : 22  AM-PAC Inpatient T-Scale Score : 53.28  Mobility Inpatient CMS 0-100% Score: 20.91  Mobility Inpatient CMS G-Code Modifier : CJ    Goals  Short Term Goals  Time Frame for Short Term Goals: 2/14/23  Short Term Goal 1: pt will perform bed mobility independently  Short Term Goal 2: pt will perform bed <> chair transfers with LRAD and supv - MET 2/08 - supv  Short Term Goal 3: pt will ambulate 250 ft with LRAD and supv - partially met 2/08 ~400 ft with 4WW and SBA  Short Term Goal 4: pt will perform 12-15 reps of BLE exercises to improve LE functional strength by 2/11/23  Short Term Goal 5: pt will perform 3 stairs with single hand rail with supv  Patient Goals   Patient Goals : \"to go home\"    Education  Patient Education  Education Given To: Patient  Education Provided: Role of Therapy;Plan of Care;Transfer Training;Energy Conservation;Precautions; Fall Prevention Strategies  Education Provided Comments: home safety, AD use and safety with mobility  Education Method: Demonstration;Verbal  Barriers to Learning: None  Education Outcome: Verbalized understanding;Demonstrated understanding    Therapy Time   Individual Concurrent Group Co-treatment   Time In 1048         Time Out 1111         Minutes 23         Timed Code Treatment Minutes: 23 Minutes     If pt is unable to be seen after this session, please let this note serve as discharge summary. Please see case management note for discharge disposition. Thank you.     Beryle Clark, PT, DPT

## 2023-02-09 ENCOUNTER — TELEPHONE (OUTPATIENT)
Dept: CARDIOLOGY CLINIC | Age: 63
End: 2023-02-09

## 2023-02-09 ENCOUNTER — TELEPHONE (OUTPATIENT)
Dept: INTERNAL MEDICINE CLINIC | Age: 63
End: 2023-02-09

## 2023-02-09 LAB — ACTIVATED CLOTTING TIME: 125 SEC (ref 99–130)

## 2023-02-09 NOTE — TELEPHONE ENCOUNTER
Spoke with Mikie Pena per CTS note he should continue Xarelto.  Asked if we had samples for him and placed them at the

## 2023-02-09 NOTE — TELEPHONE ENCOUNTER
Yanelis Rosales is needing clarification on Xarelto. States pt seems confused on if he needs this med or not. Please advise Yanelis Rosales.  [phone]

## 2023-02-09 NOTE — TELEPHONE ENCOUNTER
Care Transitions Initial Follow Up Call    Outreach made within 2 business days of discharge: Yes    Patient: Delvin Rand Patient : 1960   MRN: [id number]  Reason for Admission: There are no discharge diagnoses documented for the most recent discharge. Discharge Date: 23       Spoke with: patient    Discharge department/facility: [facility]    TCM Interactive Patient Contact:  Was patient able to fill all prescriptions: Yes  Was patient instructed to bring all medications to the follow-up visit: Yes  Is patient taking all medications as directed in the discharge summary?  Yes  Does patient understand their discharge instructions: Yes  Does patient have questions or concerns that need addressed prior to 7-14 day follow up office visit: no    Scheduled appointment with PCP within 7-14 days    Follow Up  Future Appointments   Date Time Provider Jeremías Hair   2/15/2023 11:15 AM MD THIERRY Fernandez   2023  9:00 AM Ayesha Barlow MD AND CT SURG [facility]   2023  1:00 PM MD THIERRY Fernandez   2023 10:40 AM Rose Mary Tomlin MD FF SLEEP MED [facility]       Skylar Workman MA

## 2023-02-10 LAB
BLOOD BANK DISPENSE STATUS: NORMAL
BLOOD BANK DISPENSE STATUS: NORMAL
BLOOD BANK PRODUCT CODE: NORMAL
BLOOD BANK PRODUCT CODE: NORMAL
BPU ID: NORMAL
BPU ID: NORMAL
DESCRIPTION BLOOD BANK: NORMAL
DESCRIPTION BLOOD BANK: NORMAL

## 2023-02-15 ENCOUNTER — OFFICE VISIT (OUTPATIENT)
Dept: INTERNAL MEDICINE CLINIC | Age: 63
End: 2023-02-15

## 2023-02-15 ENCOUNTER — TELEPHONE (OUTPATIENT)
Dept: CARDIOLOGY CLINIC | Age: 63
End: 2023-02-15

## 2023-02-15 VITALS
HEART RATE: 64 BPM | SYSTOLIC BLOOD PRESSURE: 110 MMHG | WEIGHT: 224 LBS | DIASTOLIC BLOOD PRESSURE: 60 MMHG | BODY MASS INDEX: 30.38 KG/M2 | OXYGEN SATURATION: 95 % | TEMPERATURE: 97.1 F

## 2023-02-15 DIAGNOSIS — I48.0 PAF (PAROXYSMAL ATRIAL FIBRILLATION) (HCC): Primary | Chronic | ICD-10-CM

## 2023-02-15 DIAGNOSIS — Z09 HOSPITAL DISCHARGE FOLLOW-UP: ICD-10-CM

## 2023-02-15 DIAGNOSIS — I10 ESSENTIAL HYPERTENSION: Chronic | ICD-10-CM

## 2023-02-15 RX ORDER — METOPROLOL TARTRATE 100 MG/1
100 TABLET ORAL 2 TIMES DAILY
Qty: 60 TABLET | Refills: 2
Start: 2023-02-15

## 2023-02-15 SDOH — ECONOMIC STABILITY: INCOME INSECURITY: HOW HARD IS IT FOR YOU TO PAY FOR THE VERY BASICS LIKE FOOD, HOUSING, MEDICAL CARE, AND HEATING?: NOT HARD AT ALL

## 2023-02-15 SDOH — ECONOMIC STABILITY: FOOD INSECURITY: WITHIN THE PAST 12 MONTHS, THE FOOD YOU BOUGHT JUST DIDN'T LAST AND YOU DIDN'T HAVE MONEY TO GET MORE.: NEVER TRUE

## 2023-02-15 SDOH — ECONOMIC STABILITY: FOOD INSECURITY: WITHIN THE PAST 12 MONTHS, YOU WORRIED THAT YOUR FOOD WOULD RUN OUT BEFORE YOU GOT MONEY TO BUY MORE.: NEVER TRUE

## 2023-02-15 SDOH — ECONOMIC STABILITY: HOUSING INSECURITY
IN THE LAST 12 MONTHS, WAS THERE A TIME WHEN YOU DID NOT HAVE A STEADY PLACE TO SLEEP OR SLEPT IN A SHELTER (INCLUDING NOW)?: NO

## 2023-02-15 ASSESSMENT — PATIENT HEALTH QUESTIONNAIRE - PHQ9
3. TROUBLE FALLING OR STAYING ASLEEP: 3
SUM OF ALL RESPONSES TO PHQ QUESTIONS 1-9: 12
SUM OF ALL RESPONSES TO PHQ QUESTIONS 1-9: 12
8. MOVING OR SPEAKING SO SLOWLY THAT OTHER PEOPLE COULD HAVE NOTICED. OR THE OPPOSITE, BEING SO FIGETY OR RESTLESS THAT YOU HAVE BEEN MOVING AROUND A LOT MORE THAN USUAL: 1
9. THOUGHTS THAT YOU WOULD BE BETTER OFF DEAD, OR OF HURTING YOURSELF: 0
2. FEELING DOWN, DEPRESSED OR HOPELESS: 1
5. POOR APPETITE OR OVEREATING: 2
4. FEELING TIRED OR HAVING LITTLE ENERGY: 3
SUM OF ALL RESPONSES TO PHQ9 QUESTIONS 1 & 2: 1
10. IF YOU CHECKED OFF ANY PROBLEMS, HOW DIFFICULT HAVE THESE PROBLEMS MADE IT FOR YOU TO DO YOUR WORK, TAKE CARE OF THINGS AT HOME, OR GET ALONG WITH OTHER PEOPLE: 1
1. LITTLE INTEREST OR PLEASURE IN DOING THINGS: 0
SUM OF ALL RESPONSES TO PHQ QUESTIONS 1-9: 12
SUM OF ALL RESPONSES TO PHQ QUESTIONS 1-9: 12
7. TROUBLE CONCENTRATING ON THINGS, SUCH AS READING THE NEWSPAPER OR WATCHING TELEVISION: 1
6. FEELING BAD ABOUT YOURSELF - OR THAT YOU ARE A FAILURE OR HAVE LET YOURSELF OR YOUR FAMILY DOWN: 1

## 2023-02-15 NOTE — LETTER
[facility] [place]  EP Procedure Sheet    2/15/23  Salma Shepard  1960  EP Procedures  [] Pacemaker implant (single/dual) [] EP Study   [] ICD implant (single/dual) [] Atrial flutter ablation (MASSIMO Y/N)   [] Biv implant ICD [] Tilt Table   [] Biv implant PPM [] Atrial fibrillation ablation (MASSIMO Yes)   [] Generator Change (PPM/ICD/BiV) [] SVT ablation   [] Lead revision (RV/LA/RA) (<1 month) [] PVC ablation     [] Lead extraction +/- upgrade (BiV/PPM/ICD) [] VT Ischemic/ non-ischemic   [] Loop implant/ removal [] VT RVOT   [x] Cardioversion [] VT Left sided   [] MASSIMO [] AVN ablation   Equipment  [] Medtronic  [] JULIA Mapping System   [] [place] [] [address]   [] [employer] [] CryoAblation   [] Biotronik [] Laser Lead Extraction   EP Procedures Scheduling Request  # hours Requested  []1 []2 []2-4 [] 4-6 Scheduled  Date:   Specific Day  Completed    Anesthesia []yes []no F/u Date:   CT surgery backup []yes []no     Overnight stay      Performing MD []RMM [x]MXA   []MKW [x] CMV First vs repeat   []1st [] 2nd [] 3rd   Pre-Procedure Labs / Imaging  [] PT/INR [] Type & cross   [] CBC [] Units PRBC   [] BMP/Mg [] Units FFP   [] Venogram [] Cardiac CTA for Pulmonary vein mapping     RN INITIALS: RA    Patient Instructions  Do not eat or drink after midnight the night prior to procedure  Dx:PAF ICD-10 code: I48.0  Had ALFONSO occlusion

## 2023-02-15 NOTE — PROGRESS NOTES
Post-Discharge Transitional Care  Follow Up      Kayden Horvath   YOB: 1960    Date of Office Visit:  2/15/2023  Date of Hospital Admission: 2/6/23  Date of Hospital Discharge: 2/8/23  Risk of hospital readmission (high >=14%. Medium >=10%) :Readmission Risk Score: 9      Care management risk score Rising risk (score 2-5) and Complex Care (Scores >=6): No Risk Score On File     Non face to face  following discharge, date last encounter closed (first attempt may have been earlier): 02/09/2023    Call initiated 2 business days of discharge: Yes    ASSESSMENT/PLAN:   PAF (paroxysmal atrial fibrillation) ([facility] [address]  Essential hypertension  Hospital discharge follow-up  -     ME DISCHARGE MEDS RECONCILED W/ CURRENT OUTPATIENT MED LIST      Medical Decision Making: high complexity  No follow-ups on file. On this date 2/15/2023 I have spent 90 minutes reviewing previous notes, test results and face to face with the patient discussing the diagnosis and importance of compliance with the treatment plan as well as documenting on the day of the visit. Subjective:   HPI:  Follow up of Hospital problems/diagnosis(es): Atrial fibrillation    Inpatient course: Discharge summary reviewed- see chart. Interval history/Current status: Patient was discharged from the hospital 4 days ago after having an atrial fibrillation. This was done subxiphoid and approach and he reports that he has been feeling well up until this morning when he developed heart palpitations, irregular heartbeat and shortness of breath. Continues to have that this morning during the visit. Patient Active Problem List   Diagnosis    Essential hypertension    KATELIN (obstructive sleep apnea)    PAF (paroxysmal atrial fibrillation) ([facility])    Disabling essential tremor    Adjustment disorder with anxious mood    Moderate episode of recurrent major depressive disorder (HCC)    Obesity (BMI 30.0-34. 9)    Parkinson's disease ([address]    Syncope and collapse       Medications listed as ordered at the time of discharge from hospital     Medication List            Accurate as of February 15, 2023 12:01 PM. If you have any questions, ask your nurse or doctor. CONTINUE taking these medications      * carbidopa-levodopa  MG per tablet  Commonly known as: SINEMET     * carbidopa-levodopa  MG per extended release tablet  Commonly known as: SINEMET CR     clonazePAM 0.5 MG tablet  Commonly known as: KLONOPIN     colchicine 0.6 MG tablet  Commonly known as: Colcrys  Take 1 tablet by mouth 2 times daily for 14 days Start this on 2/17 after you have completed 1 week of colchicine 0.6 mg daily  Start taking on: February 17, 2023     dilTIAZem 180 MG extended release capsule  Commonly known as: CARDIZEM CD  TAKE 1 CAPSULE BY MOUTH DAILY     famotidine 20 MG tablet  Commonly known as: PEPCID  Take 1 tablet by mouth daily     fexofenadine 180 MG tablet  Commonly known as: ALLEGRA     fluticasone 50 MCG/ACT nasal spray  Commonly known as: FLONASE     methocarbamol 750 MG tablet  Commonly known as: ROBAXIN  Take 1 tablet by mouth 4 times daily as needed (pain)     metoprolol 100 MG tablet  Commonly known as: LOPRESSOR     polyethylene glycol 17 g packet  Commonly known as: GLYCOLAX  Take 17 g by mouth daily as needed for Constipation     rivaroxaban 20 MG Tabs tablet  Commonly known as: Xarelto  Take 1 tablet by mouth daily (with breakfast)           * This list has 2 medication(s) that are the same as other medications prescribed for you. Read the directions carefully, and ask your doctor or other care provider to review them with you.                 STOP taking these medications      folic acid 1 MG tablet  Commonly known as: FOLVITE  Stopped by: Geetha Guadalupe MD     vitamin B-12 1000 MCG tablet  Commonly known as: CYANOCOBALAMIN  Stopped by: Geetha Guadalupe MD                Medications marked \"taking\" at this time  Outpatient Medications Marked as Taking for the 2/15/23 encounter (Office Visit) with Cody Clinton MD   Medication Sig Dispense Refill    polyethylene glycol (GLYCOLAX) 17 g packet Take 17 g by mouth daily as needed for Constipation 7 each 0    famotidine (PEPCID) 20 MG tablet Take 1 tablet by mouth daily 30 tablet 0    methocarbamol (ROBAXIN) 750 MG tablet Take 1 tablet by mouth 4 times daily as needed (pain) 56 tablet 0    [START ON 2/17/2023] colchicine (COLCRYS) 0.6 MG tablet Take 1 tablet by mouth 2 times daily for 14 days Start this on 2/17 after you have completed 1 week of colchicine 0.6 mg daily 28 tablet 0    fexofenadine (ALLEGRA) 180 MG tablet Take 180 mg by mouth daily      carbidopa-levodopa (SINEMET)  MG per tablet Take 3 tablets by mouth 5 (five) times a day Administer every 3 hours      carbidopa-levodopa (SINEMET CR)  MG per extended release tablet Take 1 tablet by mouth nightly      fluticasone (FLONASE) 50 MCG/ACT nasal spray 1 spray by Each Nostril route daily as needed for Rhinitis or Allergies      rivaroxaban (XARELTO) 20 MG TABS tablet Take 1 tablet by mouth daily (with breakfast) 90 tablet 1    dilTIAZem (CARDIZEM CD) 180 MG extended release capsule TAKE 1 CAPSULE BY MOUTH DAILY 90 capsule 3    clonazePAM (KLONOPIN) 0.5 MG tablet Take 0.75 mg by mouth nightly. Medications patient taking as of now reconciled against medications ordered at time of hospital discharge:  Yes    A comprehensive review of systems was negative except for: Cardiovascular: positive for dyspnea, irregular heart beat, and palpitations    Objective:    /60   Pulse 64   Temp 97.1 °F (36.2 °C) (Infrared)   Wt 224 lb (101.6 kg)   SpO2 95%   BMI 30.38 kg/m²   General Appearance: alert and oriented to person, place and time  Skin: warm and dry, no rash or erythema  Pulmonary/Chest: clear to auscultation bilaterally- no wheezes, rales or rhonchi, normal air movement, no respiratory distress and decreased breath sounds noted- diffusely  Cardiovascular: irregularly irregular rhythm noted and distal pulses diminished      An electronic signature was used to authenticate this note.   --Everardo Nelson MD

## 2023-02-15 NOTE — TELEPHONE ENCOUNTER
Call  placed to [facility] regarding need for DCCV. He is agreeable. Will need MASSIMO as he held his [facility] for surgery. Relayed message to Mirella and she will schedule him this week.  Also scheduled him to see [facility] 3/7/2023

## 2023-02-15 NOTE — TELEPHONE ENCOUNTER
Pt states PCP talked to RMM and they agreed pt should be seen very soon. Reason is pt had Ablation and is back in AFIB again and needs to be seen. Please advise.

## 2023-02-16 NOTE — TELEPHONE ENCOUNTER
Ether Ponto with [employer] is asking if pt is to take his meds in the morning before he comes in for his procedure tomorrow or hold them for after the procedure. Please call to advise.  [phone]

## 2023-02-16 NOTE — TELEPHONE ENCOUNTER
I spoke with pt and relayed message per [facility]. He verbalized understanding.

## 2023-02-17 ENCOUNTER — HOSPITAL ENCOUNTER (OUTPATIENT)
Dept: CARDIAC CATH/INVASIVE PROCEDURES | Age: 63
Discharge: HOME | End: 2023-02-17
Attending: INTERNAL MEDICINE | Admitting: INTERNAL MEDICINE
Payer: MEDICARE

## 2023-02-17 VITALS
RESPIRATION RATE: 16 BRPM | DIASTOLIC BLOOD PRESSURE: 86 MMHG | HEIGHT: 72 IN | TEMPERATURE: 98.4 F | BODY MASS INDEX: 30.34 KG/M2 | SYSTOLIC BLOOD PRESSURE: 144 MMHG | HEART RATE: 78 BPM | WEIGHT: 224 LBS

## 2023-02-17 PROBLEM — I48.11 LONGSTANDING PERSISTENT ATRIAL FIBRILLATION (HCC): Status: ACTIVE | Noted: 2023-02-17

## 2023-02-17 LAB
EKG ATRIAL RATE: 312 BPM
EKG ATRIAL RATE: 66 BPM
EKG DIAGNOSIS: NORMAL
EKG DIAGNOSIS: NORMAL
EKG P AXIS: 29 DEGREES
EKG P AXIS: 90 DEGREES
EKG P-R INTERVAL: 196 MS
EKG Q-T INTERVAL: 382 MS
EKG Q-T INTERVAL: 414 MS
EKG QRS DURATION: 86 MS
EKG QRS DURATION: 94 MS
EKG QTC CALCULATION (BAZETT): 434 MS
EKG QTC CALCULATION (BAZETT): 435 MS
EKG R AXIS: -4 DEGREES
EKG R AXIS: 3 DEGREES
EKG T AXIS: 41 DEGREES
EKG T AXIS: 51 DEGREES
EKG VENTRICULAR RATE: 66 BPM
EKG VENTRICULAR RATE: 78 BPM

## 2023-02-17 PROCEDURE — 7100000010 HC PHASE II RECOVERY - FIRST 15 MIN

## 2023-02-17 PROCEDURE — 93325 DOPPLER ECHO COLOR FLOW MAPG: CPT

## 2023-02-17 PROCEDURE — 93320 DOPPLER ECHO COMPLETE: CPT

## 2023-02-17 PROCEDURE — 93312 ECHO TRANSESOPHAGEAL: CPT

## 2023-02-17 PROCEDURE — 93010 ELECTROCARDIOGRAM REPORT: CPT | Performed by: INTERNAL MEDICINE

## 2023-02-17 PROCEDURE — 92960 CARDIOVERSION ELECTRIC EXT: CPT

## 2023-02-17 PROCEDURE — 93005 ELECTROCARDIOGRAM TRACING: CPT | Performed by: INTERNAL MEDICINE

## 2023-02-17 PROCEDURE — 99153 MOD SED SAME PHYS/QHP EA: CPT

## 2023-02-17 PROCEDURE — 99152 MOD SED SAME PHYS/QHP 5/>YRS: CPT

## 2023-02-17 NOTE — H&P
[facility], [place]     Electrophysiology Procedure Note       Date of Procedure: 2/17/2023  Patient's Name: Milena Encinas  YOB: 1960   Medical Record Number: [id number]    Indication: Atrial flutter, post ALFONSO clip    Consent: I have discussed with the patient and/or the patient representative the indication, alternatives, and the possible risks and/or complications of the planned procedure and the anesthesia methods. The patient and/or patient representative appear to understand and agree to proceed.     Past Medical History:   Diagnosis Date    Anxiety     Atrial fibrillation [facility])     Atrial flutter (HCC)     Atrial tachycardia ([facility] [address]     Cervical arthritis 03/30/2012    CTS (carpal tunnel syndrome) 03/13/2013    Essential tremor 2017    [place])     Hypertension     KATELIN (obstructive sleep apnea)     uses CPAP    Osteoarthritis of back 11/06/2014    Osteoarthritis of right hip 11/06/2014    Parkinson's disease ([facility] [address] 08/19/2020    PONV (postoperative nausea and vomiting)     S/P ablation of atrial fibrillation 02/25/2016    Radiofrequency ablation of atrial fibrillation and pulmonary veins isolation Additional ablation of complex atrial fractionated electrogram     Sinusitis     Trigger finger 12/10/2014       Past Surgical History:   Procedure Laterality Date    ANKLE SURGERY  2015    ANKLE SURGERY Left     ATRIAL [address]    ATRIAL ABLATION SURGERY  2/25/16    RFCA of AF and PVI, additional ablation complex atrial fractioned electrogram    BACK SURGERY  1984    lumbar patino    CARDIAC SURGERY N/A 2/6/2023    ABLATION OF POSTERIOR WALL WITH SUB XIPHOID WINDOW AND LEFT VIDEO ASSISTED THORACOSCOPIC EXCLUSION OF LEFT ATRIAL APPENDAGE, CRYO NERVE ABLATION AND INTERCOSTAL NERVE BLOCKS performed by Misti Godinez MD at [place]  4/6/16    CARPAL TUNNEL RELEASE Right 11/19/2013    RIGHT CARPAL TUNNEL RELEASE      CARPAL TUNNEL RELEASE Left 12-10-13     CARPAL TUNNEL RELEASE                COLONOSCOPY  10/29/2007    COLONOSCOPY N/A 7/30/2020    COLONOSCOPY POLYPECTOMY SNARE/COLD BIOPSY [facility] HIP ARTHROPLASTY Right 2016    right hip replacement       Prior to Admission medications    Medication Sig Start Date End Date Taking? Authorizing Provider   metoprolol (LOPRESSOR) 100 MG tablet Take 1 tablet by mouth 2 times daily 2/15/23   Marice Severin, MD   famotidine (PEPCID) 20 MG tablet Take 1 tablet by mouth daily 2/8/23 3/10/23  [address] [place] [address], APRN - [facility]   methocarbamol (ROBAXIN) 750 MG tablet Take 1 tablet by mouth 4 times daily as needed (pain) 2/8/23 2/22/23  [address] [place] [address], APRN - [facility]   colchicine (COLCRYS) 0.6 MG tablet Take 1 tablet by mouth 2 times daily for 14 days Start this on 2/17 after you have completed 1 week of colchicine 0.6 mg daily 2/17/23 3/3/23  [address] [place] [address], APRN - CNP   fexofenadine (ALLEGRA) 180 MG tablet Take 180 mg by mouth daily    Historical Provider, MD   carbidopa-levodopa (SINEMET)  MG per tablet Take 3 tablets by mouth 5 (five) times a day Administer every 3 hours    Historical Provider, MD   carbidopa-levodopa (SINEMET CR)  MG per extended release tablet Take 1 tablet by mouth nightly    Historical Provider, MD   fluticasone (FLONASE) 50 MCG/ACT nasal spray 1 spray by Each Nostril route daily as needed for Rhinitis or Allergies    Historical Provider, MD   rivaroxaban (XARELTO) 20 MG TABS tablet Take 1 tablet by mouth daily (with breakfast) 12/13/22   Johnna Patel MD   dilTIAZem (CARDIZEM CD) 180 MG extended release capsule TAKE 1 CAPSULE BY MOUTH DAILY 9/12/22   Bessy Shi, APRN - CNP   clonazePAM (KLONOPIN) 0.5 MG tablet Take 0.75 mg by mouth nightly.  3/15/21   Historical Provider, MD       Pre-Sedation Documentation and Exam:   I have personally completed a history, physical exam & review of systems for this patient (see notes). 34-year-old male with history of paroxysmal atrial fibrillation, prior ablations in the late 90s, PVI and CAF the ablation in 2016, recurrent atrial fibrillation requiring propafenone and cardioversion, repeat ablation targeting PVI and focal left-sided atrial tach, 42% atrial fibrillation burden on 9/2022 monitor, underwent subxiphoid approach posterior wall ablation and left atrial appendage clip on 2/6. Has recurrent atrial flutter. Here for MASSIMO cardioversion and evaluation of residual left atrial appendage following left atrial clip.     BP (!) 144/86   Pulse 78   Temp 98.4 °F (36.9 °C)   Resp 16   Ht 6' (1.829 m)   Wt 224 lb (101.6 kg)   BMI 30.38 kg/m²   No acute distress  Chest is clear  Heart is irregular rhythm, regular rate, no lower extremity swelling  Abdomen is soft nontender  Strength is grossly preserved  No focal neuro deficits    Mallampati Airway Assessment:  II     Prior History of Anesthesia Complications:   none     ASA Classification:  Class 3 - A patient with severe systemic disease that limits activity but is not incapacitating     Sedation/ Anesthesia Plan:   intravenous sedation     Medications Planned:   Fentanyl and midazolam (Versed) intravenously  Brevital intravenously      Patient is an appropriate candidate for plan of sedation: yes    Florencia Day MD  [facility] [place]   Office: [phone]  Fax: [phone]

## 2023-02-17 NOTE — DISCHARGE INSTRUCTIONS
MASSIMO DISCHARGE INSTRUCTIONS    No driving for 24 hours. We strongly recommend that a responsible adult stay with you for the next 24 hours. Continue Medications. Advance diet as tolerated    Contact physician office  for following symptoms:  Fever  Difficulty swallowing  Chest pain  Difficulty breathing  Bleeding    CARDIOVERSION DISCHARGE INSTRUCTIONS    No driving for 24 hours. We strongly recommend that a responsible adult stay with you for the next 24 hours. Hydrocortisone 1% cream to reddened areas as needed.        Phone: [phone]

## 2023-02-17 NOTE — PROCEDURES
[facility]     Electrophysiology Procedure Note       Date of Procedure: 2/17/2023  Patient's Name: Ruby Schulte  YOB: 1960   Medical Record Number: [id number]  Procedure Performed by: José Wheeler MD    Procedures performed:  External Electrical cardioversion   IV sedation. Start time: 1120  Stop time: 1140    Medications Used: Fentanyl, Versed, Brevital    Fentanyl: 100 Mcg   Versed: 6 Mg    Brevital Sodium: 50 Mg     Indication of the procedure:   Atrial flutter  Evaluation of residual appendage following LA clip    Details of procedure: The patient was brought to the cath lab area in a fasting and non-sedated state. The risks, benefits and alternatives of the procedure were discussed with the patient. The patient opted to proceed with the procedure. Written informed consent was signed and placed in the chart. A timeout protocol was completed to identify the patient and the procedure being performed. An independent trained nursing staff gave fentanyl and versed for sedation during MASSIMO under my supervision. Brevital was given by physician prior to cardioversion     MASSIMO  MASSIMO was performed which did not show any ALFONSO/LA clot/thrombus. Post left atrial clip. There was residual appendage with trabeculations    Cardioversion  50 mg Brevital was given by me as one dose, and electrical DC cardioversion was perfomred using 200J synchronized shock. Patient was converted to sinus rhythm. The patient tolerated the procedure well and there were no complications.      Assessment:   Successful external DC cardioversion of atrial flutter  Residual LA appendage with trabeculations     Plan:  Continue xarelto for minimum 30 days post cardioversion, recommend life anticoagulation given residual left atrial appendage and trabeculations  Follow up in electrophysiolgy clinic with Dr Seb Guerrero in 2-3 months    José Wheeler MD  [facility]   Office: [phone]  Fax: [phone]

## 2023-03-05 NOTE — PROGRESS NOTES
[address]   Electrophysiology Follow up   Date: 3/7/2023  I had the privilege of visiting Alejo Welsh in the office. CC: AF  HPI: Alejo Welsh is a 60 y.o. male with a PMH of atrial fibrillation, KATELIN, HTN, and parkinson's disease. He also has history of prior atrial fibrillation/flutter ablation in late 90's at  by Dr. Umair Vargas. 2/25/16, he had PVI and CAFE ablation with restoration of sinus rhythm with ablation. He returned with recurrent atrial fibrillation after being off Propafenone and was cardioverted on 4/6/16. He presented to [facility] ED on 9/2020 with complaints of sudden palpitations while watching TV. Admitted on 9/2020 with atrial fibrillation and RVR. He stated that his afib was worsening in the past few months prior to ablation. He had cardioversion on 8/2020 prior to admission. S/p RFCA with PVI, focal left sided atrial tachycardia, and roof line (1/20/21). Holter Monitor 04/09/2021 to 04/23/2021 Sinus rhythm, PAC burden 0.01%, no atrial fibrillation. 1st degree bloc. Max , Min HR 42, Average HR 63     Monitor 09/2022 -42% atrial fibrillation. He states Dr. Reji Salinas  him off metopolol just before he had recurrent episodes due to episodes of heat syncope with bradycardia      S/p 2/7/2023 ablation of posterior wall with sub Xiphoid window, ALFONSO exclusion by     Post-op developed atrial fibrillation. 2/17/2023 underwent MASSIMO/DCCV of atrial flutter     Gee Cotton presents to the office in follow up. He is healing well from his surgery 2/7/2023. He is feeling well today. Discussed that following his ablation there is an inflammation and it is not abnormal to see some atrial fibrillation off and on. He is working out at [employer] and he is feeling well with this but we recommended he refrain from overly strenuous activity.      Assessment and plan:     - Recurrent PAF/Atrial Flutter    ECG today shows Sinus rhythm     2/7/2023 ablation of posterior wall with sub Xiphoid window, ALFONSO exclusion, by Michael Medina     S/p RFCA with PVI and CFAE ablation (2/25/16)  S/p RFCA with PVI, focal left sided atrial tachycardia, and roof line (1/20/21). High [id number] score, high risk for stroke/thromboembolism. MASSIMO reviewed by me. There is small pouch remnant of the left atrial appendage. Patient is s/p atrial clip. Continue with anticoagulation with Xarelto for now. May consider doing a CT with contrast in future to assess for complete occlusion of the left atrial appendage. Continue Lopressor 100 mg BID and cardizem 180 mg daily for rate control    Continue working on risk factor modification   Discussed that it is not abnormal to have episodes of atrial fibrillation for the first three months following   Holter Monitor      If recurrent atrial fibrillation can consider redo ablation or antiarrhythmic therapy. -CAD   [facility] 1/2023 showed non-obstructive CAD     -HTN  controlled: 130/70  BP goal <130/80  Home BP monitoring encouraged, printed information provided on how to accurately measure BP at home. Counseled to follow a low salt diet to assure blood pressure remains controlled for cardiovascular risk factor modification. The patient is counseled to get regular exercise 3-5 times per week and maintain a healthy weight reduce cardiovascular risk factors. continue current medication        -Obesity: Body mass index is 30.68 kg/m². Excessive weight is complicating assessment and treatment. It is placing patient at risk for multiple co-morbidities as well as early death and contributing to the patient's presentation.    discussed weight management with diet and exercise    Discussed increased activity once weather is better and he can ride his bike      -KATELIN   Does not tolerate CPAP   Encourage to use machine to prevent long term effects of untreated KATELIN      Patient Active Problem List    Diagnosis Date Noted    Longstanding persistent atrial fibrillation ([facility] 02/17/2023    Park[facility]'s disease ([facility] 08/19/2020    Syncope and collapse 08/19/2020    Obesity (BMI 30.0-34.9) 02/05/2020    Moderate episode of recurrent major depressive disorder ([facility] 09/11/2018    Adjustment disorder with anxious mood 09/05/2018    Disabling essential tremor 12/01/2017    PAF (paroxysmal atrial fibrillation) ([facility] 02/25/2016    KATELIN (obstructive sleep apnea) 12/19/2014    Essential hypertension 11/16/2011     Diagnostic studies:   ECG 3/7/23  SR, QTcH 392,QRS 90      MASSIMO 2/17/2023  Rhythm is atrial flutter  Biventricular function is probably normal  Moderate to severe MR with multiple concentrically directed jets  Mildly dilated aortic root  There is residual appendage tissue measuring 1.6cm (width) x 1.82cm (depth)  There is visible trabeculation within the retained appendage tissue seen in the 65 deg and 95 deg angles (image 8 and 9)  No thrombus visualized within the LA or ALFONSO appendage  Left sided pleural effusion is present    [facility] 1/12/2023  Impression  ~Coronary Angiography w/ nonobstructive CAD  ~LVG with LVEF of 60 and no regional wall motion abnormalities    Recommendation  ~Aggressive medical treatment and risk factor modification  ~fu with CT surgery. MASSIMO 1/20/2021   Summary   Normal left ventricular cavity size and wall thickness. Global left ventricular function is normal with ejection fraction estimated   from 50 % to 55 %. No regional wall motion abnormalities noted. There is no evidence of mass or thrombus in the left atrium or appendage. Limited echo 9/14/2020   Summary   -Limited exam per Dr. Marty Zuñiga. Patient had a MASSIMO 5/2020.   -Normal left ventricle size, wall thickness, and systolic function with an   estimated ejection fraction of 55%.   -No regional wall motion abnormalities are seen.   -Trivial mitral regurgitation    I independently reviewed the cardiac diagnostic studies, ECG and relevant imaging studies.      Lab Results   Component Value Date    LVEF 60 01/12/2023    LVEFMODE Cardiac Cath 01/12/2023     Lab Results   Component Value Date    TSH 2.03 06/17/2020       Physical Examination:  Vitals:    03/07/23 1007   BP: 130/70   Pulse: 58   SpO2: 99%          Wt Readings from Last 3 Encounters:   03/07/23 226 lb 3.2 oz (102.6 kg)   02/23/23 225 lb 12.8 oz (102.4 kg)   02/17/23 224 lb (101.6 kg)     Constitutional: Oriented. No distress. Cardiovascular: Normal rate, regular rhythm, S1&S2. Pulmonary/Chest: Bilateral respiratory sounds. No rhonchi. Abdominal: Soft. No tenderness   Musculoskeletal: No edema    Neurological: Alert and oriented. Follows command    Review of System:  [x] Full ROS obtained and negative except as mentioned in HPI    Prior to Admission medications    Medication Sig Start Date End Date Taking? Authorizing Provider   rivaroxaban (XARELTO) 20 MG TABS tablet Take 1 tablet by mouth daily (with breakfast) 2/23/23 3/25/23 Yes MONTSE Godfrey   metoprolol (LOPRESSOR) 100 MG tablet Take 1 tablet by mouth 2 times daily 2/15/23  Yes Marice Severin, MD   famotidine (PEPCID) 20 MG tablet Take 1 tablet by mouth daily 2/8/23 3/10/23 Yes [address] [place] [address], APRN [facility]   fexofenadine (ALLEGRA) 180 MG tablet Take 180 mg by mouth daily   Yes Historical Provider, MD   carbidopa-levodopa (SINEMET)  MG per tablet Take 3 tablets by mouth 5 (five) times a day Administer every 3 hours   Yes Historical Provider, MD   carbidopa-levodopa (SINEMET CR)  MG per extended release tablet Take 1 tablet by mouth nightly   Yes Historical Provider, MD   fluticasone (FLONASE) 50 MCG/ACT nasal spray 1 spray by Each Nostril route daily as needed for Rhinitis or Allergies   Yes Historical Provider, MD   dilTIAZem (CARDIZEM CD) 180 MG extended release capsule TAKE 1 CAPSULE BY MOUTH DAILY 9/12/22  Yes Bessy Shi, APRN - CNP   clonazePAM (KLONOPIN) 0.5 MG tablet Take 0.75 mg by mouth nightly.  3/15/21  Yes Historical Provider, MD       No Known Allergies    Social History:  Reviewed. reports that he quit smoking about 26 years ago. His smoking use included cigarettes. He has a 15.00 pack-year smoking history. He has never used smokeless tobacco. He reports that he does not currently use alcohol after a past usage of about 20.0 standard drinks per week. He reports current drug use. Drug: Marijuana Ethel Lux). Family History:  Reviewed. Reviewed. No family history of SCD. Relevant and available labs, and cardiovascular diagnostics reviewed. Reviewed. I independently reviewed relevant and available cardiac diagnostic tests ECG, CXR, Echo, Stress test, Device interrogation, Holter, CT scan. - The patient is counseled to follow a low salt diet to assure blood pressure remains controlled for cardiovascular risk factor modification.   - The patient is counseled to avoid excess caffeine, and energy drinks as this may exacerbated ectopy and arrhythmia. - The patient is counseled to get regular exercise 3-5 times per week to control cardiovascular risk factors. - The patient is counseled to lose weigt to control cardiovascular risk factors. - The patient is counseled to avoid tobacco use. All questions and concerns were addressed to the patient/family. Alternatives to my treatment were discussed. I have discussed the above stated plan and the patient verbalized understanding and agreed with the plan. Scribe attestation: This note was scribed in the presence of Jefferson Pritchard MD by Conor Carson RN    Physician Attestation: I, Dr. Jefferson Pritchard, confirm that the scribe's documentation has been prepared under my direction and personally reviewed by me in its entirety. I also confirm that the note above accurately reflects all work, treatment, procedures, and medical decision making performed by me. NOTE: This report was transcribed using voice recognition software.  Every effort was made to ensure accuracy, however, inadvertent computerized transcription errors may be present.      Stephen Liu MD, MPH  [facility]   Office: [phone]  Fax: [phone]

## 2023-03-07 ENCOUNTER — OFFICE VISIT (OUTPATIENT)
Dept: CARDIOLOGY CLINIC | Age: 63
End: 2023-03-07
Payer: MEDICARE

## 2023-03-07 VITALS
DIASTOLIC BLOOD PRESSURE: 70 MMHG | OXYGEN SATURATION: 99 % | BODY MASS INDEX: 30.64 KG/M2 | HEART RATE: 58 BPM | SYSTOLIC BLOOD PRESSURE: 130 MMHG | HEIGHT: 72 IN | WEIGHT: 226.2 LBS

## 2023-03-07 DIAGNOSIS — G47.33 OSA (OBSTRUCTIVE SLEEP APNEA): Chronic | ICD-10-CM

## 2023-03-07 DIAGNOSIS — I10 ESSENTIAL HYPERTENSION: Primary | Chronic | ICD-10-CM

## 2023-03-07 DIAGNOSIS — I48.0 PAF (PAROXYSMAL ATRIAL FIBRILLATION) (HCC): Chronic | ICD-10-CM

## 2023-03-07 PROCEDURE — 3017F COLORECTAL CA SCREEN DOC REV: CPT | Performed by: INTERNAL MEDICINE

## 2023-03-07 PROCEDURE — 3078F DIAST BP <80 MM HG: CPT | Performed by: INTERNAL MEDICINE

## 2023-03-07 PROCEDURE — 99214 OFFICE O/P EST MOD 30 MIN: CPT | Performed by: INTERNAL MEDICINE

## 2023-03-07 PROCEDURE — G8427 DOCREV CUR MEDS BY ELIG CLIN: HCPCS | Performed by: INTERNAL MEDICINE

## 2023-03-07 PROCEDURE — 93000 ELECTROCARDIOGRAM COMPLETE: CPT | Performed by: INTERNAL MEDICINE

## 2023-03-07 PROCEDURE — G8417 CALC BMI ABV UP PARAM F/U: HCPCS | Performed by: INTERNAL MEDICINE

## 2023-03-07 PROCEDURE — 1036F TOBACCO NON-USER: CPT | Performed by: INTERNAL MEDICINE

## 2023-03-07 PROCEDURE — 3075F SYST BP GE 130 - 139MM HG: CPT | Performed by: INTERNAL MEDICINE

## 2023-03-07 PROCEDURE — G8482 FLU IMMUNIZE ORDER/ADMIN: HCPCS | Performed by: INTERNAL MEDICINE

## 2023-03-07 PROCEDURE — 1111F DSCHRG MED/CURRENT MED MERGE: CPT | Performed by: INTERNAL MEDICINE

## 2023-03-07 NOTE — PROGRESS NOTES
14 day BioTel monitor placed on the patient here in clinic today. Reviewed proper care and instructions with the patient.      SN : [id number]

## 2023-03-22 ENCOUNTER — TELEPHONE (OUTPATIENT)
Dept: CARDIOLOGY CLINIC | Age: 63
End: 2023-03-22

## 2023-03-22 NOTE — TELEPHONE ENCOUNTER
Spoke to pt and advised him that it'll be a couple of weeks before we et the results back, he v/u. Call complete.

## 2023-03-22 NOTE — TELEPHONE ENCOUNTER
Pt called to ask when he can expect to get his results on his Monitor he turned in yesterday. Stated the last time he wore one it was over a month before he heard anything. Please advise.

## 2023-03-27 ENCOUNTER — TELEPHONE (OUTPATIENT)
Dept: CARDIOLOGY CLINIC | Age: 63
End: 2023-03-27

## 2023-03-27 NOTE — TELEPHONE ENCOUNTER
Pt asking if he is to continue xarelto? If so he is requesting Samples 20 mg  once daily.     Please call to advise  [phone]

## 2023-03-28 RX ORDER — FLUTICASONE PROPIONATE 50 MCG
SPRAY, SUSPENSION (ML) NASAL
Qty: 16 G | Refills: 2 | Status: SHIPPED | OUTPATIENT
Start: 2023-03-28

## 2023-03-30 NOTE — TELEPHONE ENCOUNTER
Sample requested:   rivaroxaban (XARELTO) 20 MG TABS tablet     Strength:     Dosage:  1 a day    Patient's call back number:  [phone]    Pt states he is out of this med. Asking for Samples. I noticed Xarelto was Ended in his Med list. Please advise.

## 2023-04-03 ENCOUNTER — TELEPHONE (OUTPATIENT)
Dept: CARDIOLOGY CLINIC | Age: 63
End: 2023-04-03

## 2023-04-03 NOTE — TELEPHONE ENCOUNTER
Pt called to check to see if the results from his monitor has came in, if so, Pt is requesting his results. Please advise.   Thank you

## 2023-04-25 NOTE — TELEPHONE ENCOUNTER
Called and spoke to the patient and gave him number for patient assistance and also told him that his samples will be ready to be picked up after 230pm

## 2023-04-25 NOTE — TELEPHONE ENCOUNTER
Sample requested:   XARELTO  Strength: 20 mg    Dosage: 1 daily    Patient's call back number:    [phone]  Only has 1 dose left

## 2023-05-22 ENCOUNTER — OFFICE VISIT (OUTPATIENT)
Dept: INTERNAL MEDICINE CLINIC | Age: 63
End: 2023-05-22
Payer: MEDICARE

## 2023-05-22 VITALS
OXYGEN SATURATION: 98 % | HEIGHT: 72 IN | HEART RATE: 50 BPM | SYSTOLIC BLOOD PRESSURE: 112 MMHG | RESPIRATION RATE: 16 BRPM | WEIGHT: 219 LBS | DIASTOLIC BLOOD PRESSURE: 62 MMHG | TEMPERATURE: 98 F | BODY MASS INDEX: 29.66 KG/M2

## 2023-05-22 DIAGNOSIS — Z00.00 MEDICARE ANNUAL WELLNESS VISIT, SUBSEQUENT: Primary | ICD-10-CM

## 2023-05-22 DIAGNOSIS — G20 PARKINSON'S DISEASE (HCC): Chronic | ICD-10-CM

## 2023-05-22 DIAGNOSIS — G62.9 NEUROPATHY: ICD-10-CM

## 2023-05-22 DIAGNOSIS — I48.11 LONGSTANDING PERSISTENT ATRIAL FIBRILLATION (HCC): ICD-10-CM

## 2023-05-22 DIAGNOSIS — F33.1 MODERATE EPISODE OF RECURRENT MAJOR DEPRESSIVE DISORDER (HCC): ICD-10-CM

## 2023-05-22 PROCEDURE — G0439 PPPS, SUBSEQ VISIT: HCPCS | Performed by: INTERNAL MEDICINE

## 2023-05-22 PROCEDURE — 3078F DIAST BP <80 MM HG: CPT | Performed by: INTERNAL MEDICINE

## 2023-05-22 PROCEDURE — 3017F COLORECTAL CA SCREEN DOC REV: CPT | Performed by: INTERNAL MEDICINE

## 2023-05-22 PROCEDURE — 3074F SYST BP LT 130 MM HG: CPT | Performed by: INTERNAL MEDICINE

## 2023-05-22 ASSESSMENT — COLUMBIA-SUICIDE SEVERITY RATING SCALE - C-SSRS
2. HAVE YOU ACTUALLY HAD ANY THOUGHTS OF KILLING YOURSELF?: NO
1. WITHIN THE PAST MONTH, HAVE YOU WISHED YOU WERE DEAD OR WISHED YOU COULD GO TO SLEEP AND NOT WAKE UP?: NO
6. HAVE YOU EVER DONE ANYTHING, STARTED TO DO ANYTHING, OR PREPARED TO DO ANYTHING TO END YOUR LIFE?: NO

## 2023-05-22 ASSESSMENT — PATIENT HEALTH QUESTIONNAIRE - PHQ9
6. FEELING BAD ABOUT YOURSELF - OR THAT YOU ARE A FAILURE OR HAVE LET YOURSELF OR YOUR FAMILY DOWN: 1
4. FEELING TIRED OR HAVING LITTLE ENERGY: 3
9. THOUGHTS THAT YOU WOULD BE BETTER OFF DEAD, OR OF HURTING YOURSELF: 0
2. FEELING DOWN, DEPRESSED OR HOPELESS: 3
SUM OF ALL RESPONSES TO PHQ QUESTIONS 1-9: 12
SUM OF ALL RESPONSES TO PHQ QUESTIONS 1-9: 12
5. POOR APPETITE OR OVEREATING: 3
10. IF YOU CHECKED OFF ANY PROBLEMS, HOW DIFFICULT HAVE THESE PROBLEMS MADE IT FOR YOU TO DO YOUR WORK, TAKE CARE OF THINGS AT HOME, OR GET ALONG WITH OTHER PEOPLE: 1
SUM OF ALL RESPONSES TO PHQ QUESTIONS 1-9: 12
3. TROUBLE FALLING OR STAYING ASLEEP: 1
7. TROUBLE CONCENTRATING ON THINGS, SUCH AS READING THE NEWSPAPER OR WATCHING TELEVISION: 1
SUM OF ALL RESPONSES TO PHQ QUESTIONS 1-9: 12
8. MOVING OR SPEAKING SO SLOWLY THAT OTHER PEOPLE COULD HAVE NOTICED. OR THE OPPOSITE, BEING SO FIGETY OR RESTLESS THAT YOU HAVE BEEN MOVING AROUND A LOT MORE THAN USUAL: 0

## 2023-05-22 ASSESSMENT — LIFESTYLE VARIABLES
HOW MANY STANDARD DRINKS CONTAINING ALCOHOL DO YOU HAVE ON A TYPICAL DAY: 1 OR 2
HOW OFTEN DO YOU HAVE A DRINK CONTAINING ALCOHOL: NEVER

## 2023-05-22 NOTE — PROGRESS NOTES
Medicare Annual Wellness Visit    [address] Castellanos is here for Medicare AWV (Annual Well visit )    Assessment & Plan   Medicare annual wellness visit, subsequent  Longstanding persistent atrial fibrillation (HCC)  Moderate episode of recurrent major depressive disorder ([facility]  Parkinson's disease ([facility]  Neuropathy      Recommendations for Preventive Services Due: see orders and patient instructions/AVS.  Recommended screening schedule for the next 5-10 years is provided to the patient in written form: see Patient Instructions/AVS.     No follow-ups on file. Subjective   The following acute and/or chronic problems were also addressed today:  Neuropathy and Atrial fibrillation    Patient's complete Health Risk Assessment and screening values have been reviewed and are found in Flowsheets. The following problems were reviewed today and where indicated follow up appointments were made and/or referrals ordered. Positive Risk Factor Screenings with Interventions:    Fall Risk:  Do you feel unsteady or are you worried about falling? : (!) yes  2 or more falls in past year?: no  Fall with injury in past year?: no     Interventions:    Patient comments: He has difficulty standing. He is seing a neurologist soon. Depression:  PHQ-9 Total Score: 12    Interpretation:   1-4 = minimal  5-9 = mild  10-14 = moderate  15-19 = moderately severe  20-27 = severe  Interventions:  Patient declines any further evaluation or treatment     Drug Use:          Interpretation:  1-2: Low level - Monitor, re-assess at a later date  3-5: Moderate level - Further Investigation  6-8: Substantial level - Intensive Assessment  9-10: Severe level - Intensive Assessment    Interventions:  Patient comments: He would consider to physical therapy       Self-assessment of health: In general, how would you say your health is?: (!) Poor    Interventions:   He would consider physiical therapy    General HRA Questions:  Select all that apply:

## 2023-05-22 NOTE — PATIENT INSTRUCTIONS
Care instructions adapted under license by [facility] ([facility]). If you have questions about a medical condition or this instruction, always ask your healthcare professional. [address] any warranty or liability for your use of this information. Learning About Emotional Support  When do you need emotional support? You might find getting support from others helpful when you have a long-term health problem. Often people feel alone, confused, or scared when coping with an illness. But you aren't alone. Other people are going through the same thing you are and know how you feel. Talking with others about your feelings can help you feel better. Your family and friends can give you support. So can your doctor, a support group, or a Yarsanism. If you have a support network, you will not feel as alone. You will learn new ways to deal with your situation, and you may try harder to overcome it. Where you can get support  Family and friends: They can help you cope by giving you comfort and encouragement. Counseling: Professional counseling can help you cope with situations that interfere with your life and cause stress. Counseling can help you understand and deal with your illness. Your doctor: Find a doctor you trust and feel comfortable with. Be open and honest about your fears and concerns. Your doctor can help you get the right medical treatments, including counseling. Spiritual or Yazdanism groups: They can provide comfort and may be able to help you find counseling or other social support services. Social groups: They can help you meet new people and get involved in activities you enjoy. Community support groups: In a support group, you can talk to others who have dealt with the same problems or illness as you. You can encourage one another and learn ways to cope with tough emotions.   How to find a support group  Ask your doctor, counselor, or other health professional for

## 2023-06-05 RX ORDER — METOPROLOL TARTRATE 100 MG/1
TABLET ORAL
Qty: 60 TABLET | Refills: 2 | Status: SHIPPED | OUTPATIENT
Start: 2023-06-05

## 2023-06-05 RX ORDER — METOPROLOL TARTRATE 100 MG/1
TABLET ORAL
Qty: 60 TABLET | Refills: 2 | OUTPATIENT
Start: 2023-06-05

## 2023-06-20 ENCOUNTER — OFFICE VISIT (OUTPATIENT)
Dept: CARDIOLOGY CLINIC | Age: 63
End: 2023-06-20
Payer: MEDICARE

## 2023-06-20 VITALS
OXYGEN SATURATION: 97 % | WEIGHT: 211 LBS | BODY MASS INDEX: 28.58 KG/M2 | HEIGHT: 72 IN | DIASTOLIC BLOOD PRESSURE: 62 MMHG | SYSTOLIC BLOOD PRESSURE: 100 MMHG | HEART RATE: 52 BPM

## 2023-06-20 DIAGNOSIS — G47.33 OSA (OBSTRUCTIVE SLEEP APNEA): Chronic | ICD-10-CM

## 2023-06-20 DIAGNOSIS — Q20.8: ICD-10-CM

## 2023-06-20 DIAGNOSIS — I48.0 PAF (PAROXYSMAL ATRIAL FIBRILLATION) (HCC): Chronic | ICD-10-CM

## 2023-06-20 DIAGNOSIS — I10 ESSENTIAL HYPERTENSION: Primary | Chronic | ICD-10-CM

## 2023-06-20 DIAGNOSIS — Z98.890 STATUS POST LIGATION OF LEFT ATRIAL APPENDAGE: ICD-10-CM

## 2023-06-20 DIAGNOSIS — I48.91 ATRIAL FIBRILLATION, UNSPECIFIED TYPE (HCC): ICD-10-CM

## 2023-06-20 PROCEDURE — 93000 ELECTROCARDIOGRAM COMPLETE: CPT | Performed by: INTERNAL MEDICINE

## 2023-06-20 PROCEDURE — 3017F COLORECTAL CA SCREEN DOC REV: CPT | Performed by: INTERNAL MEDICINE

## 2023-06-20 PROCEDURE — 99214 OFFICE O/P EST MOD 30 MIN: CPT | Performed by: INTERNAL MEDICINE

## 2023-06-20 PROCEDURE — 3078F DIAST BP <80 MM HG: CPT | Performed by: INTERNAL MEDICINE

## 2023-06-20 PROCEDURE — G8417 CALC BMI ABV UP PARAM F/U: HCPCS | Performed by: INTERNAL MEDICINE

## 2023-06-20 PROCEDURE — 3074F SYST BP LT 130 MM HG: CPT | Performed by: INTERNAL MEDICINE

## 2023-06-20 PROCEDURE — 1036F TOBACCO NON-USER: CPT | Performed by: INTERNAL MEDICINE

## 2023-06-20 PROCEDURE — G8427 DOCREV CUR MEDS BY ELIG CLIN: HCPCS | Performed by: INTERNAL MEDICINE

## 2023-06-21 ENCOUNTER — TELEPHONE (OUTPATIENT)
Dept: CARDIOLOGY CLINIC | Age: 63
End: 2023-06-21

## 2023-06-21 NOTE — TELEPHONE ENCOUNTER
Central Scheduling states they need new diagnoses for CTA due to [facility] not accepting code.

## 2023-06-29 ENCOUNTER — TELEPHONE (OUTPATIENT)
Dept: CARDIOLOGY CLINIC | Age: 63
End: 2023-06-29

## 2023-07-25 ENCOUNTER — HOSPITAL ENCOUNTER (OUTPATIENT)
Dept: CT IMAGING | Age: 63
Discharge: HOME | End: 2023-07-25
Attending: INTERNAL MEDICINE
Payer: MEDICARE

## 2023-07-25 DIAGNOSIS — Q20.8: ICD-10-CM

## 2023-07-25 DIAGNOSIS — Z98.890 STATUS POST LIGATION OF LEFT ATRIAL APPENDAGE: ICD-10-CM

## 2023-07-25 DIAGNOSIS — I48.91 ATRIAL FIBRILLATION, UNSPECIFIED TYPE (HCC): ICD-10-CM

## 2023-07-25 LAB
CREAT SERPL-MCNC: 0.6 MG/DL (ref 0.8–1.3)
GFR SERPLBLD CREATININE-BSD FMLA CKD-EPI: >60 ML/MIN/{1.73_M2}

## 2023-07-25 PROCEDURE — 82565 ASSAY OF CREATININE: CPT

## 2023-07-25 PROCEDURE — 75574 CT ANGIO HRT W/3D IMAGE: CPT

## 2023-07-25 PROCEDURE — 6360000004 HC RX CONTRAST MEDICATION: Performed by: INTERNAL MEDICINE

## 2023-07-25 PROCEDURE — 36415 COLL VENOUS BLD VENIPUNCTURE: CPT

## 2023-07-25 RX ADMIN — IOPAMIDOL 100 ML: 755 INJECTION, SOLUTION INTRAVENOUS at 12:52

## 2023-08-02 ENCOUNTER — TELEPHONE (OUTPATIENT)
Dept: CARDIOLOGY CLINIC | Age: 63
End: 2023-08-02

## 2023-08-02 NOTE — TELEPHONE ENCOUNTER
Discussed with NPSR  - patient should continue [facility]     Called and discussed results with patient. Patient  states   \" that's bull sh *t\". Im not payin for that \" fu**in sh*t\". The were supposed to fix that. \"     Advised patient that  he contnues to be at risk of stroke if has recurrent atrial fibrillation. Advised I could provide him with information for financial resources. He declined. Discussed warfarin  - he declined. He wanted to know what we are going to do to fix  it . Advised he could discuss with MD at his follow up appointment.

## 2023-08-23 LAB
ESTIMATED AVERAGE GLUCOSE: NORMAL
HBA1C MFR BLD: 5 %

## 2023-08-29 ENCOUNTER — TELEPHONE (OUTPATIENT)
Dept: INTERNAL MEDICINE CLINIC | Age: 63
End: 2023-08-29

## 2023-08-29 NOTE — TELEPHONE ENCOUNTER
Patient saw a neurologist, Dr Mahendra Villalobos about a week ago. Patient was told the neuro office forwarded the consult notes to PCP to coordinate labs needed so he can be started on multiple vitamin supplements to help w/his symptoms. Patient called to make sure the consult notes were received and discuss what labs Dr Primitivo Ho was going to order so he knows what vitamins and how much he should be started on. Patient said he is willing to schedule an appt w/Dr Primitivo Ho if it would be less confusing. Patient was very agitated b/c he said the numbness in his extremities is getting worse.

## 2023-08-31 DIAGNOSIS — G62.9 NEUROPATHY: Primary | ICD-10-CM

## 2023-09-11 RX ORDER — DILTIAZEM HYDROCHLORIDE 180 MG/1
180 CAPSULE, COATED, EXTENDED RELEASE ORAL DAILY
Qty: 90 CAPSULE | Refills: 3 | Status: SHIPPED | OUTPATIENT
Start: 2023-09-11

## 2023-09-11 RX ORDER — METOPROLOL TARTRATE 100 MG/1
TABLET ORAL
Qty: 60 TABLET | Refills: 2 | Status: SHIPPED | OUTPATIENT
Start: 2023-09-11

## 2023-09-11 NOTE — TELEPHONE ENCOUNTER
Recent Visits  Date Type Provider Dept   05/22/23 Office Visit Asaf Shi MD [facility]   02/15/23 Office Visit Asaf Shi MD [facility]   11/21/22 Office Visit Asaf Shi MD [facility]   08/29/22 Office Visit Asaf Shi MD [facility]   Showing recent visits within past 540 days with a meds authorizing provider and meeting all other requirements  Future Appointments  Date Type Provider Dept   11/22/23 Appointment Asaf Shi MD [facility]   Showing future appointments within next 150 days with a meds authorizing provider and meeting all other requirements     5/22/2023

## 2023-09-12 RX ORDER — DILTIAZEM HYDROCHLORIDE 180 MG/1
180 CAPSULE, COATED, EXTENDED RELEASE ORAL DAILY
Qty: 90 CAPSULE | Refills: 3 | OUTPATIENT
Start: 2023-09-12

## 2023-09-13 NOTE — PROGRESS NOTES
Review of Systems:  Constitutional:  + night sweats, headaches, weight loss. Eyes:  No glaucoma, cataracts. Wears glasses   ENMT:  No nosebleeds, deviated septum. Cardiac:  + A-fib, HTN  Vascular:  No claudication, varicosities. GI:  No PUD, heartburn. + constipation   :  No kidney stones, frequent UTIs  Musculoskeletal:  + arthritis, No gout. Respiratory:  +  SOB, No emphysema, asthma. Integumentary:  No dermatitis, itching, rash. Neurological:  No stroke, TIAs, seizures + parkinsons  Psychiatric:  + depression, anxiety. Endocrine: No diabetes, thyroid issues. Hematologic:  + bleeding, easy bruising d/t medications   Immunologic:  No known cancer, steroid therapies.      Mai Rosas RN Benzoyl Peroxide Counseling: Patient counseled that medicine may cause skin irritation and bleach clothing.  In the event of skin irritation, the patient was advised to reduce the amount of the drug applied or use it less frequently.   The patient verbalized understanding of the proper use and possible adverse effects of benzoyl peroxide.  All of the patient's questions and concerns were addressed.

## 2023-09-28 DIAGNOSIS — G62.9 NEUROPATHY: ICD-10-CM

## 2023-09-28 LAB
25(OH)D3 SERPL-MCNC: 44.9 NG/ML
FOLATE SERPL-MCNC: 7.65 NG/ML (ref 4.78–24.2)
VIT B12 SERPL-MCNC: 593 PG/ML (ref 211–911)

## 2023-10-06 LAB
VIT B1 SERPL-MCNC: 7 NMOL/L (ref 4–15)
VIT B6 SERPL-MCNC: 172.4 NMOL/L (ref 20–125)

## 2023-12-01 RX ORDER — METOPROLOL TARTRATE 100 MG/1
TABLET ORAL
Qty: 60 TABLET | Refills: 2 | Status: SHIPPED | OUTPATIENT
Start: 2023-12-01

## 2023-12-18 PROBLEM — G63 POLYNEUROPATHY DUE TO VITAMIN B DEFICIENCY (HCC): Status: ACTIVE | Noted: 2023-12-18

## 2023-12-18 PROBLEM — E53.9 POLYNEUROPATHY DUE TO VITAMIN B DEFICIENCY (HCC): Status: ACTIVE | Noted: 2023-12-18

## 2023-12-28 ENCOUNTER — TELEPHONE (OUTPATIENT)
Dept: INTERNAL MEDICINE CLINIC | Age: 63
End: 2023-12-28

## 2023-12-28 NOTE — TELEPHONE ENCOUNTER
Pt calling for a pyschologist phone#, he called the 2 that was given and one is only for substance abuse and the other one was not working. May want to go to [facility].     Please advise pt

## 2024-03-08 RX ORDER — METOPROLOL TARTRATE 100 MG/1
TABLET ORAL
Qty: 60 TABLET | Refills: 2 | Status: SHIPPED | OUTPATIENT
Start: 2024-03-08

## 2024-03-08 NOTE — TELEPHONE ENCOUNTER
Recent Visits  Date Type Provider Dept   12/18/23 Office Visit Fer Bates MD [facility]   05/22/23 Office Visit Fer Bates MD [facility]   02/15/23 Office Visit Fer Bates MD [facility]   11/21/22 Office Visit Fer Bates MD [facility]   Showing recent visits within past 540 days with a meds authorizing provider and meeting all other requirements  Future Appointments  No visits were found meeting these conditions.  Showing future appointments within next 150 days with a meds authorizing provider and meeting all other requirements     12/18/2023

## 2024-06-07 NOTE — TELEPHONE ENCOUNTER
Recent Visits  Date Type Provider Dept   12/18/23 Office Visit Fer Bates MD [facility]   05/22/23 Office Visit Fer Bates MD [facility]   02/15/23 Office Visit Fer Bates MD [facility]   Showing recent visits within past 540 days with a meds authorizing provider and meeting all other requirements  Future Appointments  No visits were found meeting these conditions.  Showing future appointments within next 150 days with a meds authorizing provider and meeting all other requirements     12/18/2023

## 2024-06-08 RX ORDER — METOPROLOL TARTRATE 100 MG/1
TABLET ORAL
Qty: 60 TABLET | Refills: 2 | Status: SHIPPED | OUTPATIENT
Start: 2024-06-08

## 2024-07-15 ENCOUNTER — OFFICE VISIT (OUTPATIENT)
Dept: INTERNAL MEDICINE CLINIC | Age: 64
End: 2024-07-15
Payer: MEDICARE

## 2024-07-15 ENCOUNTER — TELEPHONE (OUTPATIENT)
Dept: INTERNAL MEDICINE CLINIC | Age: 64
End: 2024-07-15

## 2024-07-15 VITALS
OXYGEN SATURATION: 96 % | HEART RATE: 67 BPM | WEIGHT: 211 LBS | SYSTOLIC BLOOD PRESSURE: 126 MMHG | BODY MASS INDEX: 28.58 KG/M2 | HEIGHT: 72 IN | DIASTOLIC BLOOD PRESSURE: 74 MMHG

## 2024-07-15 DIAGNOSIS — J01.90 ACUTE RHINOSINUSITIS: ICD-10-CM

## 2024-07-15 DIAGNOSIS — J18.9 COMMUNITY ACQUIRED PNEUMONIA, UNSPECIFIED LATERALITY: Primary | ICD-10-CM

## 2024-07-15 PROCEDURE — G8427 DOCREV CUR MEDS BY ELIG CLIN: HCPCS | Performed by: STUDENT IN AN ORGANIZED HEALTH CARE EDUCATION/TRAINING PROGRAM

## 2024-07-15 PROCEDURE — G8417 CALC BMI ABV UP PARAM F/U: HCPCS | Performed by: STUDENT IN AN ORGANIZED HEALTH CARE EDUCATION/TRAINING PROGRAM

## 2024-07-15 PROCEDURE — 3074F SYST BP LT 130 MM HG: CPT | Performed by: STUDENT IN AN ORGANIZED HEALTH CARE EDUCATION/TRAINING PROGRAM

## 2024-07-15 PROCEDURE — 3078F DIAST BP <80 MM HG: CPT | Performed by: STUDENT IN AN ORGANIZED HEALTH CARE EDUCATION/TRAINING PROGRAM

## 2024-07-15 PROCEDURE — 1036F TOBACCO NON-USER: CPT | Performed by: STUDENT IN AN ORGANIZED HEALTH CARE EDUCATION/TRAINING PROGRAM

## 2024-07-15 PROCEDURE — 99213 OFFICE O/P EST LOW 20 MIN: CPT | Performed by: STUDENT IN AN ORGANIZED HEALTH CARE EDUCATION/TRAINING PROGRAM

## 2024-07-15 PROCEDURE — 3017F COLORECTAL CA SCREEN DOC REV: CPT | Performed by: STUDENT IN AN ORGANIZED HEALTH CARE EDUCATION/TRAINING PROGRAM

## 2024-07-15 RX ORDER — GUAIFENESIN 600 MG/1
600 TABLET, EXTENDED RELEASE ORAL 2 TIMES DAILY
Qty: 30 TABLET | Refills: 0 | Status: SHIPPED | OUTPATIENT
Start: 2024-07-15 | End: 2024-07-30

## 2024-07-15 RX ORDER — AMOXICILLIN 875 MG/1
875 TABLET, COATED ORAL 2 TIMES DAILY
Qty: 10 TABLET | Refills: 0 | Status: SHIPPED | OUTPATIENT
Start: 2024-07-15 | End: 2024-07-20

## 2024-07-15 RX ORDER — GUAIFENESIN/DEXTROMETHORPHAN 100-10MG/5
5 SYRUP ORAL 3 TIMES DAILY PRN
Qty: 120 ML | Refills: 0 | Status: SHIPPED | OUTPATIENT
Start: 2024-07-15 | End: 2024-07-25

## 2024-07-15 SDOH — ECONOMIC STABILITY: FOOD INSECURITY: WITHIN THE PAST 12 MONTHS, YOU WORRIED THAT YOUR FOOD WOULD RUN OUT BEFORE YOU GOT MONEY TO BUY MORE.: NEVER TRUE

## 2024-07-15 SDOH — ECONOMIC STABILITY: FOOD INSECURITY: WITHIN THE PAST 12 MONTHS, THE FOOD YOU BOUGHT JUST DIDN'T LAST AND YOU DIDN'T HAVE MONEY TO GET MORE.: NEVER TRUE

## 2024-07-15 SDOH — ECONOMIC STABILITY: INCOME INSECURITY: HOW HARD IS IT FOR YOU TO PAY FOR THE VERY BASICS LIKE FOOD, HOUSING, MEDICAL CARE, AND HEATING?: NOT HARD AT ALL

## 2024-07-15 ASSESSMENT — ANXIETY QUESTIONNAIRES
7. FEELING AFRAID AS IF SOMETHING AWFUL MIGHT HAPPEN: MORE THAN HALF THE DAYS
1. FEELING NERVOUS, ANXIOUS, OR ON EDGE: MORE THAN HALF THE DAYS
6. BECOMING EASILY ANNOYED OR IRRITABLE: NEARLY EVERY DAY
4. TROUBLE RELAXING: MORE THAN HALF THE DAYS
3. WORRYING TOO MUCH ABOUT DIFFERENT THINGS: MORE THAN HALF THE DAYS
GAD7 TOTAL SCORE: 15
2. NOT BEING ABLE TO STOP OR CONTROL WORRYING: MORE THAN HALF THE DAYS
5. BEING SO RESTLESS THAT IT IS HARD TO SIT STILL: MORE THAN HALF THE DAYS
IF YOU CHECKED OFF ANY PROBLEMS ON THIS QUESTIONNAIRE, HOW DIFFICULT HAVE THESE PROBLEMS MADE IT FOR YOU TO DO YOUR WORK, TAKE CARE OF THINGS AT HOME, OR GET ALONG WITH OTHER PEOPLE: SOMEWHAT DIFFICULT

## 2024-07-15 ASSESSMENT — PATIENT HEALTH QUESTIONNAIRE - PHQ9
4. FEELING TIRED OR HAVING LITTLE ENERGY: NEARLY EVERY DAY
1. LITTLE INTEREST OR PLEASURE IN DOING THINGS: NEARLY EVERY DAY
SUM OF ALL RESPONSES TO PHQ9 QUESTIONS 1 & 2: 5
8. MOVING OR SPEAKING SO SLOWLY THAT OTHER PEOPLE COULD HAVE NOTICED. OR THE OPPOSITE, BEING SO FIGETY OR RESTLESS THAT YOU HAVE BEEN MOVING AROUND A LOT MORE THAN USUAL: MORE THAN HALF THE DAYS
7. TROUBLE CONCENTRATING ON THINGS, SUCH AS READING THE NEWSPAPER OR WATCHING TELEVISION: MORE THAN HALF THE DAYS
3. TROUBLE FALLING OR STAYING ASLEEP: NEARLY EVERY DAY
10. IF YOU CHECKED OFF ANY PROBLEMS, HOW DIFFICULT HAVE THESE PROBLEMS MADE IT FOR YOU TO DO YOUR WORK, TAKE CARE OF THINGS AT HOME, OR GET ALONG WITH OTHER PEOPLE: SOMEWHAT DIFFICULT
SUM OF ALL RESPONSES TO PHQ QUESTIONS 1-9: 19
SUM OF ALL RESPONSES TO PHQ QUESTIONS 1-9: 19
2. FEELING DOWN, DEPRESSED OR HOPELESS: MORE THAN HALF THE DAYS
SUM OF ALL RESPONSES TO PHQ QUESTIONS 1-9: 19
9. THOUGHTS THAT YOU WOULD BE BETTER OFF DEAD, OR OF HURTING YOURSELF: NOT AT ALL
5. POOR APPETITE OR OVEREATING: MORE THAN HALF THE DAYS
6. FEELING BAD ABOUT YOURSELF - OR THAT YOU ARE A FAILURE OR HAVE LET YOURSELF OR YOUR FAMILY DOWN: MORE THAN HALF THE DAYS
SUM OF ALL RESPONSES TO PHQ QUESTIONS 1-9: 19

## 2024-07-15 ASSESSMENT — COLUMBIA-SUICIDE SEVERITY RATING SCALE - C-SSRS
1. WITHIN THE PAST MONTH, HAVE YOU WISHED YOU WERE DEAD OR WISHED YOU COULD GO TO SLEEP AND NOT WAKE UP?: NO
6. HAVE YOU EVER DONE ANYTHING, STARTED TO DO ANYTHING, OR PREPARED TO DO ANYTHING TO END YOUR LIFE?: NO
2. HAVE YOU ACTUALLY HAD ANY THOUGHTS OF KILLING YOURSELF?: NO

## 2024-07-15 NOTE — PROGRESS NOTES
appearance.   HENT:      Head:      Comments: + Frontal and maxillary sinus ttp     Right Ear: Tympanic membrane normal.      Left Ear: Tympanic membrane normal.      Nose: Congestion present.      Mouth/Throat:      Mouth: Mucous membranes are moist.      Pharynx: Posterior oropharyngeal erythema present.      Comments: + Postnasal drip  Cardiovascular:      Rate and Rhythm: Normal rate and regular rhythm.      Pulses: Normal pulses.      Heart sounds: Normal heart sounds.   Pulmonary:      Effort: Pulmonary effort is normal.      Breath sounds: No wheezing.      Comments: ULL coarse breath sounds    Neurological:      Mental Status: He is alert.      Comments: +chorea            An electronic signature was used to authenticate this note.    --Ashley Schulte DO   Total time spent for this encounter: Not billed by time    Documentation was done using voice recognition dragon software.  Every effort was made to ensure accuracy; however, inadvertent, unintentional computerized transcription errors may be present.  .

## 2024-09-02 NOTE — TELEPHONE ENCOUNTER
Recent Visits  Date Type Provider Dept   07/15/24 Office Visit Ashley Schulte DO [facility]   12/18/23 Office Visit Fer Bates MD [facility]   05/22/23 Office Visit Fer Bates MD [facility]   Showing recent visits within past 540 days with a meds authorizing provider and meeting all other requirements  Future Appointments  Date Type Provider Dept   10/30/24 Appointment Fer aBtes MD [facility]   Showing future appointments within next 150 days with a meds authorizing provider and meeting all other requirements     7/15/2024

## 2024-09-03 RX ORDER — METOPROLOL TARTRATE 100 MG/1
TABLET ORAL
Qty: 60 TABLET | Refills: 2 | OUTPATIENT
Start: 2024-09-03

## 2024-09-03 RX ORDER — METOPROLOL TARTRATE 100 MG
TABLET ORAL
Qty: 60 TABLET | Refills: 2 | Status: SHIPPED | OUTPATIENT
Start: 2024-09-03

## 2024-09-03 RX ORDER — DILTIAZEM HYDROCHLORIDE 180 MG/1
180 CAPSULE, EXTENDED RELEASE ORAL DAILY
Qty: 90 CAPSULE | Refills: 3 | Status: SHIPPED | OUTPATIENT
Start: 2024-09-03

## 2024-09-09 RX ORDER — METOPROLOL TARTRATE 100 MG/1
TABLET ORAL
Qty: 60 TABLET | Refills: 2 | OUTPATIENT
Start: 2024-09-09

## 2024-09-09 RX ORDER — METOPROLOL TARTRATE 100 MG
100 TABLET ORAL 2 TIMES DAILY
Qty: 60 TABLET | Refills: 2 | Status: CANCELLED | OUTPATIENT
Start: 2024-09-09

## 2024-10-30 ENCOUNTER — OFFICE VISIT (OUTPATIENT)
Dept: INTERNAL MEDICINE CLINIC | Age: 64
End: 2024-10-30
Payer: MEDICARE

## 2024-10-30 VITALS
SYSTOLIC BLOOD PRESSURE: 98 MMHG | OXYGEN SATURATION: 97 % | WEIGHT: 208 LBS | DIASTOLIC BLOOD PRESSURE: 68 MMHG | HEIGHT: 72 IN | HEART RATE: 55 BPM | BODY MASS INDEX: 28.17 KG/M2

## 2024-10-30 DIAGNOSIS — M25.552 LEFT HIP PAIN: ICD-10-CM

## 2024-10-30 DIAGNOSIS — G63 POLYNEUROPATHY DUE TO VITAMIN B DEFICIENCY (HCC): ICD-10-CM

## 2024-10-30 DIAGNOSIS — Z00.00 MEDICARE ANNUAL WELLNESS VISIT, SUBSEQUENT: Primary | ICD-10-CM

## 2024-10-30 DIAGNOSIS — E53.9 POLYNEUROPATHY DUE TO VITAMIN B DEFICIENCY (HCC): ICD-10-CM

## 2024-10-30 DIAGNOSIS — I48.0 PAF (PAROXYSMAL ATRIAL FIBRILLATION) (HCC): ICD-10-CM

## 2024-10-30 DIAGNOSIS — F17.200 TOBACCO USE DISORDER: ICD-10-CM

## 2024-10-30 DIAGNOSIS — F33.1 MODERATE EPISODE OF RECURRENT MAJOR DEPRESSIVE DISORDER (HCC): ICD-10-CM

## 2024-10-30 DIAGNOSIS — G20.B2 PARKINSON'S DISEASE WITH DYSKINESIA AND FLUCTUATING MANIFESTATIONS (HCC): ICD-10-CM

## 2024-10-30 PROCEDURE — 1123F ACP DISCUSS/DSCN MKR DOCD: CPT | Performed by: INTERNAL MEDICINE

## 2024-10-30 PROCEDURE — 3074F SYST BP LT 130 MM HG: CPT | Performed by: INTERNAL MEDICINE

## 2024-10-30 PROCEDURE — 3078F DIAST BP <80 MM HG: CPT | Performed by: INTERNAL MEDICINE

## 2024-10-30 PROCEDURE — 3017F COLORECTAL CA SCREEN DOC REV: CPT | Performed by: INTERNAL MEDICINE

## 2024-10-30 PROCEDURE — G0439 PPPS, SUBSEQ VISIT: HCPCS | Performed by: INTERNAL MEDICINE

## 2024-10-30 PROCEDURE — G8484 FLU IMMUNIZE NO ADMIN: HCPCS | Performed by: INTERNAL MEDICINE

## 2024-10-30 SDOH — ECONOMIC STABILITY: FOOD INSECURITY: WITHIN THE PAST 12 MONTHS, YOU WORRIED THAT YOUR FOOD WOULD RUN OUT BEFORE YOU GOT MONEY TO BUY MORE.: PATIENT DECLINED

## 2024-10-30 SDOH — ECONOMIC STABILITY: INCOME INSECURITY: HOW HARD IS IT FOR YOU TO PAY FOR THE VERY BASICS LIKE FOOD, HOUSING, MEDICAL CARE, AND HEATING?: SOMEWHAT HARD

## 2024-10-30 SDOH — ECONOMIC STABILITY: FOOD INSECURITY: WITHIN THE PAST 12 MONTHS, THE FOOD YOU BOUGHT JUST DIDN'T LAST AND YOU DIDN'T HAVE MONEY TO GET MORE.: PATIENT DECLINED

## 2024-10-30 ASSESSMENT — LIFESTYLE VARIABLES
HOW MANY STANDARD DRINKS CONTAINING ALCOHOL DO YOU HAVE ON A TYPICAL DAY: 1 OR 2
HOW OFTEN DO YOU HAVE A DRINK CONTAINING ALCOHOL: 2-3 TIMES A WEEK

## 2024-10-30 ASSESSMENT — PATIENT HEALTH QUESTIONNAIRE - PHQ9
SUM OF ALL RESPONSES TO PHQ9 QUESTIONS 1 & 2: 5
9. THOUGHTS THAT YOU WOULD BE BETTER OFF DEAD, OR OF HURTING YOURSELF: NOT AT ALL
SUM OF ALL RESPONSES TO PHQ QUESTIONS 1-9: 15
4. FEELING TIRED OR HAVING LITTLE ENERGY: MORE THAN HALF THE DAYS
3. TROUBLE FALLING OR STAYING ASLEEP: NEARLY EVERY DAY
1. LITTLE INTEREST OR PLEASURE IN DOING THINGS: NEARLY EVERY DAY
6. FEELING BAD ABOUT YOURSELF - OR THAT YOU ARE A FAILURE OR HAVE LET YOURSELF OR YOUR FAMILY DOWN: SEVERAL DAYS
7. TROUBLE CONCENTRATING ON THINGS, SUCH AS READING THE NEWSPAPER OR WATCHING TELEVISION: SEVERAL DAYS
10. IF YOU CHECKED OFF ANY PROBLEMS, HOW DIFFICULT HAVE THESE PROBLEMS MADE IT FOR YOU TO DO YOUR WORK, TAKE CARE OF THINGS AT HOME, OR GET ALONG WITH OTHER PEOPLE: SOMEWHAT DIFFICULT
2. FEELING DOWN, DEPRESSED OR HOPELESS: MORE THAN HALF THE DAYS
SUM OF ALL RESPONSES TO PHQ QUESTIONS 1-9: 15
5. POOR APPETITE OR OVEREATING: NOT AT ALL
SUM OF ALL RESPONSES TO PHQ QUESTIONS 1-9: 15
8. MOVING OR SPEAKING SO SLOWLY THAT OTHER PEOPLE COULD HAVE NOTICED. OR THE OPPOSITE, BEING SO FIGETY OR RESTLESS THAT YOU HAVE BEEN MOVING AROUND A LOT MORE THAN USUAL: NEARLY EVERY DAY
SUM OF ALL RESPONSES TO PHQ QUESTIONS 1-9: 15

## 2024-10-30 ASSESSMENT — ANXIETY QUESTIONNAIRES
2. NOT BEING ABLE TO STOP OR CONTROL WORRYING: MORE THAN HALF THE DAYS
1. FEELING NERVOUS, ANXIOUS, OR ON EDGE: MORE THAN HALF THE DAYS
GAD7 TOTAL SCORE: 14
4. TROUBLE RELAXING: NEARLY EVERY DAY
3. WORRYING TOO MUCH ABOUT DIFFERENT THINGS: MORE THAN HALF THE DAYS
5. BEING SO RESTLESS THAT IT IS HARD TO SIT STILL: MORE THAN HALF THE DAYS
7. FEELING AFRAID AS IF SOMETHING AWFUL MIGHT HAPPEN: SEVERAL DAYS
IF YOU CHECKED OFF ANY PROBLEMS ON THIS QUESTIONNAIRE, HOW DIFFICULT HAVE THESE PROBLEMS MADE IT FOR YOU TO DO YOUR WORK, TAKE CARE OF THINGS AT HOME, OR GET ALONG WITH OTHER PEOPLE: SOMEWHAT DIFFICULT
6. BECOMING EASILY ANNOYED OR IRRITABLE: MORE THAN HALF THE DAYS

## 2024-10-30 NOTE — PATIENT INSTRUCTIONS
you from feeling overwhelmed.  Help you manage a health problem. For example, ask them to go to doctor visits with you. Your loved ones can offer support by being involved in your medical care.  Respect your relationships  A good relationship is also a two-way street. You count on help from others, but they also count on you.  Know your friends' limits. You don't have to see or call your friends every day. If you are going through a rough patch, ask friends if you can contact them outside of the usual boundaries.  Don't always complain or talk about yourself. Know when it's time to stop talking and listen or just enjoy your friend's company.  Know that good friends can be a bad influence. For example, if a friend encourages you to drink when you know it will harm you, you may want to end the friendship.  Where can you learn more?  Go to https://www.[employer].net/patientEd and enter G092 to learn more about \"Learning About Emotional Support.\"  Current as of: June 24, 2023  Content Version: 14.2  © 2024 [employer].   Care instructions adapted under license by [employer]. If you have questions about a medical condition or this instruction, always ask your healthcare professional. [employer] disclaims any warranty or liability for your use of this information.           Learning About Stress  What is stress?     Stress is your body's response to a hard situation. Your body can have a physical, emotional, or mental response. Stress is a fact of life for most people, and it affects everyone differently. What causes stress for you may not be stressful for someone else.  A lot of things can cause stress. You may feel stress when you go on a job interview, take a test, or run a race. This kind of short-term stress is normal and even useful. It can help you if you need to work hard or react quickly. For example, stress can help you finish an important job on time.  Long-term stress is caused by

## 2024-10-30 NOTE — PROGRESS NOTES
Pain, New or Increased Fatigue, Loneliness, Anger, Stress  Interventions - Pain:  Patient comments: New  Interventions Fatigue:  Patient comments: Possibly related to depression  Interventions - Loneliness:  Patient comments: He sees his daughter but not very often  Interventions - Stress:  Patient comments: Primarily due to his PD and other problems  Interventions - Anger:  Patient comments: Not angry today.  He is            Vision Screen:  Do you have difficulty driving, watching TV, or doing any of your daily activities because of your eyesight?: No  Have you had an eye exam within the past year?: (!) No  Interventions:   Patient encouraged to make appointment with their eye specialist    Safety:  Do you have any tripping hazards - loose or unsecured carpets or rugs?: (!) Yes  Do you always fasten your seatbelt when you are in a car?: (!) No  Interventions:  Patient comments: He will secure carpets                 Objective   Vitals:    10/30/24 1000   BP: 98/68   Site: Right Upper Arm   Position: Sitting   Cuff Size: Large Adult   Pulse: 55   SpO2: 97%   Weight: 94.3 kg (208 lb)   Height: 1.82 m (5' 11.65\")      Body mass index is 28.48 kg/m².        Physical Exam  Vitals reviewed.   Constitutional:       General: He is not in acute distress.     Appearance: He is well-developed. He is not diaphoretic.   HENT:      Head: Normocephalic and atraumatic.   Cardiovascular:      Pulses: Normal pulses.      Heart sounds: Normal heart sounds. No murmur heard.     No friction rub. No gallop.   Pulmonary:      Effort: Pulmonary effort is normal. No respiratory distress.      Breath sounds: Normal breath sounds. No stridor. No wheezing, rhonchi or rales.   Chest:      Chest wall: No tenderness.   Neurological:      Mental Status: He is alert and oriented to person, place, and time.      Cranial Nerves: No cranial nerve deficit.   Psychiatric:         Behavior: Behavior normal.         Thought Content: Thought content

## 2024-11-12 ENCOUNTER — HOSPITAL ENCOUNTER (OUTPATIENT)
Dept: GENERAL RADIOLOGY | Age: 64
Discharge: HOME | End: 2024-11-12
Attending: INTERNAL MEDICINE
Payer: MEDICARE

## 2024-11-12 ENCOUNTER — HOSPITAL ENCOUNTER (OUTPATIENT)
Age: 64
Discharge: HOME | End: 2024-11-12
Attending: INTERNAL MEDICINE
Payer: MEDICARE

## 2024-11-12 ENCOUNTER — HOSPITAL ENCOUNTER (OUTPATIENT)
Dept: CT IMAGING | Age: 64
Discharge: HOME | End: 2024-11-12
Attending: INTERNAL MEDICINE
Payer: MEDICARE

## 2024-11-12 ENCOUNTER — HOSPITAL ENCOUNTER (OUTPATIENT)
Dept: ULTRASOUND IMAGING | Age: 64
Discharge: HOME | End: 2024-11-12
Attending: INTERNAL MEDICINE
Payer: MEDICARE

## 2024-11-12 DIAGNOSIS — F17.200 TOBACCO USE DISORDER: ICD-10-CM

## 2024-11-12 DIAGNOSIS — M25.552 LEFT HIP PAIN: ICD-10-CM

## 2024-11-12 LAB
CREAT SERPL-MCNC: 0.8 MG/DL (ref 0.8–1.3)
GFR SERPLBLD CREATININE-BSD FMLA CKD-EPI: >90 ML/MIN/{1.73_M2}

## 2024-11-12 PROCEDURE — 6360000004 HC RX CONTRAST MEDICATION: Performed by: INTERNAL MEDICINE

## 2024-11-12 PROCEDURE — 36415 COLL VENOUS BLD VENIPUNCTURE: CPT

## 2024-11-12 PROCEDURE — 72170 X-RAY EXAM OF PELVIS: CPT

## 2024-11-12 PROCEDURE — 74178 CT ABD&PLV WO CNTR FLWD CNTR: CPT

## 2024-11-12 PROCEDURE — 82565 ASSAY OF CREATININE: CPT

## 2024-11-12 PROCEDURE — 76706 US ABDL AORTA SCREEN AAA: CPT

## 2024-11-12 RX ORDER — IOPAMIDOL 755 MG/ML
75 INJECTION, SOLUTION INTRAVASCULAR
Status: COMPLETED | OUTPATIENT
Start: 2024-11-12 | End: 2024-11-12

## 2024-11-12 RX ADMIN — IOPAMIDOL 75 ML: 755 INJECTION, SOLUTION INTRAVENOUS at 13:47

## 2024-11-16 NOTE — PROGRESS NOTES
[facility]iology  Tio Pacheco, EMILIA-CNP  Attending EP: Dr. Coulter    Date: 12/16/2024  I had the privilege of visiting Karri Rivera in the office.     Chief Complaint:   Chief Complaint   Patient presents with    Follow-up     No cardiac symptoms at this time.     History of Present Illness: History obtained from patient and medical record.    Karri Rivera is 64 y.o. male with a past medical history of atrial fibrillation, KATELIN, HTN, and Parkinson's disease. Hx of atrial fibrillation/flutter at  in the late 1990s. S/p RFCA with PVI and CFAE ablation (2/25/16). S/p DCCV (4/6/16). In September of 2020, he had recurrent AF. S/p RFCA with PVI, focal left sided atrial tachycardia, and roof line (1/20/21). S/p epicardial ablation with ablation of posterior wall with sub xiphoid window with ALFONSO exclusion (2/7/23)    -Interval history: Today, Karri Rivera is being seen for annual follow up. He is doing well. He denies any recurrence of atrial fibrillation. He stays busy around his home and taking care of his mom. He is under some stress as his mother just had hip surgery and is coming home from the hospital today.     Denies having chest pain, palpitations, shortness of breath, orthopnea/PND, cough, or dizziness at the time of this visit.    With regard to medication therapy the patient has been compliant with prescribed regimen. They have tolerated therapy to date.     Allergies:  No Known Allergies    Home Medications:  Prior to Visit Medications    Medication Sig Taking? Authorizing Provider   metoprolol (LOPRESSOR) 100 MG tablet TAKE 1 TABLET BY MOUTH TWICE DAILY Yes Fer Bates MD   ibuprofen (ADVIL;MOTRIN) 800 MG tablet Take 1 tablet by mouth 2 times daily as needed for Pain Yes Fer Bates MD   fluticasone (FLONASE) 50 MCG/ACT nasal spray SHAKE LIQUID AND USE 1 SPRAY IN EACH NOSTRIL DAILY Yes Fer Bates MD   fexofenadine (ALLEGRA) 180 MG tablet Take 1 tablet by

## 2024-11-18 ENCOUNTER — TELEPHONE (OUTPATIENT)
Dept: INTERNAL MEDICINE CLINIC | Age: 64
End: 2024-11-18

## 2024-11-18 NOTE — TELEPHONE ENCOUNTER
Patient had xrays taken last week on 11/12 and would like someone to call him with the results.     Please advise

## 2024-12-16 ENCOUNTER — OFFICE VISIT (OUTPATIENT)
Dept: CARDIOLOGY CLINIC | Age: 64
End: 2024-12-16
Payer: MEDICARE

## 2024-12-16 VITALS
HEART RATE: 62 BPM | DIASTOLIC BLOOD PRESSURE: 62 MMHG | BODY MASS INDEX: 28.17 KG/M2 | HEIGHT: 72 IN | SYSTOLIC BLOOD PRESSURE: 122 MMHG | OXYGEN SATURATION: 92 % | WEIGHT: 208 LBS

## 2024-12-16 DIAGNOSIS — E66.811 OBESITY (BMI 30.0-34.9): Chronic | ICD-10-CM

## 2024-12-16 DIAGNOSIS — G47.33 OSA (OBSTRUCTIVE SLEEP APNEA): Chronic | ICD-10-CM

## 2024-12-16 DIAGNOSIS — I48.0 PAF (PAROXYSMAL ATRIAL FIBRILLATION) (HCC): Primary | Chronic | ICD-10-CM

## 2024-12-16 DIAGNOSIS — I10 ESSENTIAL HYPERTENSION: Chronic | ICD-10-CM

## 2024-12-16 DIAGNOSIS — I48.11 LONGSTANDING PERSISTENT ATRIAL FIBRILLATION (HCC): ICD-10-CM

## 2024-12-16 PROCEDURE — 1036F TOBACCO NON-USER: CPT | Performed by: NURSE PRACTITIONER

## 2024-12-16 PROCEDURE — 3017F COLORECTAL CA SCREEN DOC REV: CPT | Performed by: NURSE PRACTITIONER

## 2024-12-16 PROCEDURE — G8427 DOCREV CUR MEDS BY ELIG CLIN: HCPCS | Performed by: NURSE PRACTITIONER

## 2024-12-16 PROCEDURE — 3074F SYST BP LT 130 MM HG: CPT | Performed by: NURSE PRACTITIONER

## 2024-12-16 PROCEDURE — 3078F DIAST BP <80 MM HG: CPT | Performed by: NURSE PRACTITIONER

## 2024-12-16 PROCEDURE — 99214 OFFICE O/P EST MOD 30 MIN: CPT | Performed by: NURSE PRACTITIONER

## 2024-12-16 PROCEDURE — G8484 FLU IMMUNIZE NO ADMIN: HCPCS | Performed by: NURSE PRACTITIONER

## 2024-12-16 PROCEDURE — 93000 ELECTROCARDIOGRAM COMPLETE: CPT | Performed by: NURSE PRACTITIONER

## 2024-12-16 PROCEDURE — G8417 CALC BMI ABV UP PARAM F/U: HCPCS | Performed by: NURSE PRACTITIONER

## 2024-12-16 PROCEDURE — 1123F ACP DISCUSS/DSCN MKR DOCD: CPT | Performed by: NURSE PRACTITIONER

## 2024-12-16 RX ORDER — FLUTICASONE PROPIONATE 50 MCG
SPRAY, SUSPENSION (ML) NASAL
Qty: 16 G | Refills: 2 | Status: SHIPPED | OUTPATIENT
Start: 2024-12-16

## 2024-12-16 NOTE — TELEPHONE ENCOUNTER
Recent Visits  Date Type Provider Dept   10/30/24 Office Visit Fer Bates MD [facility]   07/15/24 Office Visit Ashley Schulte DO [facility]   12/18/23 Office Visit Fer Bates MD [facility]   Showing recent visits within past 540 days with a meds authorizing provider and meeting all other requirements  Future Appointments  Date Type Provider Dept   03/03/25 Appointment Fer Bates MD [facility]   Showing future appointments within next 150 days with a meds authorizing provider and meeting all other requirements     10/30/2024

## 2025-02-18 ENCOUNTER — TELEPHONE (OUTPATIENT)
Dept: CARDIOLOGY CLINIC | Age: 65
End: 2025-02-18

## 2025-02-18 NOTE — TELEPHONE ENCOUNTER
CARDIAC CLEARANCE     What type of procedure are you having?  Left Hip Replacement     Which physician is performing your procedure?  Dr. Shaw Hammer     When is your procedure scheduled for?   03/25    Where are you having this procedure?  [facility]    Are you taking Blood Thinners?  No   If so what? (Name/dose/frequesncy)  N/A   Does the surgeon want you to stop your blood thinner?  If so for how long?  N/A    Phone Number and Contact Name for Physicians office:  [place]   [phone]    Dr. Shaw Hammer [phone]    Fax number to send information:  [phone]    NOTE: Per Pt needs 30 days before his surgery.

## 2025-03-03 ENCOUNTER — OFFICE VISIT (OUTPATIENT)
Dept: INTERNAL MEDICINE CLINIC | Age: 65
End: 2025-03-03
Payer: MEDICARE

## 2025-03-03 VITALS
HEIGHT: 71 IN | OXYGEN SATURATION: 99 % | DIASTOLIC BLOOD PRESSURE: 76 MMHG | HEART RATE: 55 BPM | BODY MASS INDEX: 28.28 KG/M2 | WEIGHT: 202 LBS | SYSTOLIC BLOOD PRESSURE: 120 MMHG

## 2025-03-03 DIAGNOSIS — F33.1 MODERATE EPISODE OF RECURRENT MAJOR DEPRESSIVE DISORDER (HCC): ICD-10-CM

## 2025-03-03 DIAGNOSIS — I48.0 PAF (PAROXYSMAL ATRIAL FIBRILLATION) (HCC): ICD-10-CM

## 2025-03-03 DIAGNOSIS — M16.12 PRIMARY OSTEOARTHRITIS OF LEFT HIP: ICD-10-CM

## 2025-03-03 DIAGNOSIS — Z01.818 PRE-OP EXAM: Primary | ICD-10-CM

## 2025-03-03 DIAGNOSIS — G20.B2 PARKINSON'S DISEASE WITH DYSKINESIA AND FLUCTUATING MANIFESTATIONS (HCC): Chronic | ICD-10-CM

## 2025-03-03 DIAGNOSIS — R63.4 WEIGHT LOSS, UNINTENTIONAL: ICD-10-CM

## 2025-03-03 DIAGNOSIS — K57.30 DIVERTICULOSIS OF COLON: ICD-10-CM

## 2025-03-03 DIAGNOSIS — Z23 NEED FOR PNEUMOCOCCAL VACCINE: ICD-10-CM

## 2025-03-03 DIAGNOSIS — I71.43 INFRARENAL ABDOMINAL AORTIC ANEURYSM (AAA) WITHOUT RUPTURE: ICD-10-CM

## 2025-03-03 DIAGNOSIS — I10 ESSENTIAL HYPERTENSION: Chronic | ICD-10-CM

## 2025-03-03 PROCEDURE — 90677 PCV20 VACCINE IM: CPT | Performed by: INTERNAL MEDICINE

## 2025-03-03 PROCEDURE — G8427 DOCREV CUR MEDS BY ELIG CLIN: HCPCS | Performed by: INTERNAL MEDICINE

## 2025-03-03 PROCEDURE — G8417 CALC BMI ABV UP PARAM F/U: HCPCS | Performed by: INTERNAL MEDICINE

## 2025-03-03 PROCEDURE — 99214 OFFICE O/P EST MOD 30 MIN: CPT | Performed by: INTERNAL MEDICINE

## 2025-03-03 PROCEDURE — 1123F ACP DISCUSS/DSCN MKR DOCD: CPT | Performed by: INTERNAL MEDICINE

## 2025-03-03 PROCEDURE — 1036F TOBACCO NON-USER: CPT | Performed by: INTERNAL MEDICINE

## 2025-03-03 PROCEDURE — 3078F DIAST BP <80 MM HG: CPT | Performed by: INTERNAL MEDICINE

## 2025-03-03 PROCEDURE — 3017F COLORECTAL CA SCREEN DOC REV: CPT | Performed by: INTERNAL MEDICINE

## 2025-03-03 PROCEDURE — 3074F SYST BP LT 130 MM HG: CPT | Performed by: INTERNAL MEDICINE

## 2025-03-03 PROCEDURE — G0009 ADMIN PNEUMOCOCCAL VACCINE: HCPCS | Performed by: INTERNAL MEDICINE

## 2025-03-03 RX ORDER — CITALOPRAM HYDROBROMIDE 20 MG/1
20 TABLET ORAL DAILY
Qty: 30 TABLET | Refills: 3 | Status: SHIPPED | OUTPATIENT
Start: 2025-03-03

## 2025-03-03 RX ORDER — LEVODOPA AND CARBIDOPA 95; 23.75 MG/1; MG/1
1 CAPSULE, EXTENDED RELEASE ORAL
Qty: 450 CAPSULE | Refills: 1
Start: 2025-03-03

## 2025-03-03 SDOH — ECONOMIC STABILITY: FOOD INSECURITY: WITHIN THE PAST 12 MONTHS, YOU WORRIED THAT YOUR FOOD WOULD RUN OUT BEFORE YOU GOT MONEY TO BUY MORE.: NEVER TRUE

## 2025-03-03 SDOH — ECONOMIC STABILITY: FOOD INSECURITY: WITHIN THE PAST 12 MONTHS, THE FOOD YOU BOUGHT JUST DIDN'T LAST AND YOU DIDN'T HAVE MONEY TO GET MORE.: NEVER TRUE

## 2025-03-03 ASSESSMENT — PATIENT HEALTH QUESTIONNAIRE - PHQ9
7. TROUBLE CONCENTRATING ON THINGS, SUCH AS READING THE NEWSPAPER OR WATCHING TELEVISION: SEVERAL DAYS
SUM OF ALL RESPONSES TO PHQ QUESTIONS 1-9: 15
SUM OF ALL RESPONSES TO PHQ QUESTIONS 1-9: 15
5. POOR APPETITE OR OVEREATING: MORE THAN HALF THE DAYS
4. FEELING TIRED OR HAVING LITTLE ENERGY: NEARLY EVERY DAY
SUM OF ALL RESPONSES TO PHQ QUESTIONS 1-9: 15
3. TROUBLE FALLING OR STAYING ASLEEP: NEARLY EVERY DAY
6. FEELING BAD ABOUT YOURSELF - OR THAT YOU ARE A FAILURE OR HAVE LET YOURSELF OR YOUR FAMILY DOWN: SEVERAL DAYS
SUM OF ALL RESPONSES TO PHQ QUESTIONS 1-9: 15
10. IF YOU CHECKED OFF ANY PROBLEMS, HOW DIFFICULT HAVE THESE PROBLEMS MADE IT FOR YOU TO DO YOUR WORK, TAKE CARE OF THINGS AT HOME, OR GET ALONG WITH OTHER PEOPLE: NOT DIFFICULT AT ALL
9. THOUGHTS THAT YOU WOULD BE BETTER OFF DEAD, OR OF HURTING YOURSELF: NOT AT ALL
2. FEELING DOWN, DEPRESSED OR HOPELESS: MORE THAN HALF THE DAYS
1. LITTLE INTEREST OR PLEASURE IN DOING THINGS: MORE THAN HALF THE DAYS
8. MOVING OR SPEAKING SO SLOWLY THAT OTHER PEOPLE COULD HAVE NOTICED. OR THE OPPOSITE, BEING SO FIGETY OR RESTLESS THAT YOU HAVE BEEN MOVING AROUND A LOT MORE THAN USUAL: SEVERAL DAYS

## 2025-03-03 ASSESSMENT — ENCOUNTER SYMPTOMS
SHORTNESS OF BREATH: 0
COUGH: 0
DIARRHEA: 0
BLOOD IN STOOL: 0
CONSTIPATION: 0

## 2025-03-03 NOTE — PROGRESS NOTES
atraumatic.      Right Ear: Tympanic membrane, ear canal and external ear normal. There is no impacted cerumen.      Left Ear: Tympanic membrane, ear canal and external ear normal.      Nose: Nose normal. No nasal deformity, congestion or rhinorrhea.      Mouth/Throat:      Mouth: Mucous membranes are moist. No lacerations or oral lesions.      Dentition: Does not have dentures.      Pharynx: Oropharynx is clear. Posterior oropharyngeal erythema (Patchy) present. No oropharyngeal exudate or uvula swelling.   Neck:      Thyroid: No thyroid mass or thyromegaly.      Vascular: Normal carotid pulses. No carotid bruit, hepatojugular reflux or JVD.      Trachea: Trachea normal. No tracheal tenderness or tracheal deviation.   Cardiovascular:      Rate and Rhythm: Normal rate and regular rhythm.      Chest Wall: PMI is not displaced.      Pulses: Normal pulses.      Heart sounds: Normal heart sounds, S1 normal and S2 normal. Heart sounds not distant. No murmur heard.     No systolic murmur is present.      No diastolic murmur is present.      No friction rub. No gallop. No S3 or S4 sounds.   Pulmonary:      Effort: Pulmonary effort is normal. No respiratory distress.      Breath sounds: No stridor. No wheezing, rhonchi or rales.   Chest:      Chest wall: No tenderness.   Abdominal:      General: Bowel sounds are normal. There is no distension or abdominal bruit.      Palpations: Abdomen is soft. There is no shifting dullness or mass.      Tenderness: There is no abdominal tenderness. There is no right CVA tenderness, left CVA tenderness, guarding or rebound.      Hernia: No hernia is present.   Musculoskeletal:      Cervical back: Full passive range of motion without pain, normal range of motion and neck supple. No edema or rigidity.      Right lower leg: No edema.      Left lower leg: No edema.   Lymphadenopathy:      Cervical: No cervical adenopathy.   Skin:     General: Skin is warm and dry.      Coloration: Skin is not

## 2025-03-19 ENCOUNTER — TELEPHONE (OUTPATIENT)
Dept: CARDIOLOGY CLINIC | Age: 65
End: 2025-03-19

## 2025-03-19 NOTE — TELEPHONE ENCOUNTER
Natasha called asking if they could get cardiac clearance letter can get faxed to:    [phone] and [phone]    Pt is scheduled for surgery 3/25

## 2025-05-30 NOTE — TELEPHONE ENCOUNTER
Pt needs scheduled for AWV  early November      Recent Visits  Date Type Provider Dept   03/03/25 Office Visit Fer Bates MD [facility]   10/30/24 Office Visit Fer Bates MD [facility]   07/15/24 Office Visit Ashley Schulte DO [facility]   12/18/23 Office Visit Fer Bates MD [facility]   Showing recent visits within past 540 days with a meds authorizing provider and meeting all other requirements  Future Appointments  No visits were found meeting these conditions.  Showing future appointments within next 150 days with a meds authorizing provider and meeting all other requirements     3/3/2025

## 2025-05-31 RX ORDER — METOPROLOL TARTRATE 100 MG/1
100 TABLET ORAL 2 TIMES DAILY
Qty: 180 TABLET | Refills: 1 | Status: SHIPPED | OUTPATIENT
Start: 2025-05-31

## 2025-06-16 DIAGNOSIS — G63 POLYNEUROPATHY DUE TO VITAMIN B DEFICIENCY: ICD-10-CM

## 2025-06-16 DIAGNOSIS — E53.9 POLYNEUROPATHY DUE TO VITAMIN B DEFICIENCY: ICD-10-CM

## 2025-06-16 RX ORDER — IBUPROFEN 800 MG/1
800 TABLET, FILM COATED ORAL 2 TIMES DAILY PRN
Qty: 180 TABLET | Refills: 1 | Status: SHIPPED | OUTPATIENT
Start: 2025-06-16

## 2025-06-16 NOTE — TELEPHONE ENCOUNTER
Recent Visits  Date Type Provider Dept   03/03/25 Office Visit Fer Bates MD [facility]   10/30/24 Office Visit Fer Bates MD [facility]   07/15/24 Office Visit Ashley Schulte DO [facility]   Showing recent visits within past 540 days with a meds authorizing provider and meeting all other requirements  Future Appointments  Date Type Provider Dept   11/05/25 Appointment Fer Bates MD [facility]   Showing future appointments within next 150 days with a meds authorizing provider and meeting all other requirements     3/3/2025

## 2025-07-09 ENCOUNTER — OFFICE VISIT (OUTPATIENT)
Dept: INTERNAL MEDICINE CLINIC | Age: 65
End: 2025-07-09
Payer: MEDICARE

## 2025-07-09 VITALS
OXYGEN SATURATION: 98 % | SYSTOLIC BLOOD PRESSURE: 122 MMHG | BODY MASS INDEX: 27.97 KG/M2 | HEART RATE: 57 BPM | HEIGHT: 71 IN | WEIGHT: 199.8 LBS | DIASTOLIC BLOOD PRESSURE: 72 MMHG

## 2025-07-09 DIAGNOSIS — G25.0 DISABLING ESSENTIAL TREMOR: Chronic | ICD-10-CM

## 2025-07-09 DIAGNOSIS — G47.01 INSOMNIA DUE TO MEDICAL CONDITION: ICD-10-CM

## 2025-07-09 DIAGNOSIS — F51.01 PRIMARY INSOMNIA: ICD-10-CM

## 2025-07-09 DIAGNOSIS — R63.4 WEIGHT LOSS: ICD-10-CM

## 2025-07-09 DIAGNOSIS — G20.B2 PARKINSON'S DISEASE WITH DYSKINESIA AND FLUCTUATING MANIFESTATIONS (HCC): Primary | Chronic | ICD-10-CM

## 2025-07-09 DIAGNOSIS — F33.1 MODERATE EPISODE OF RECURRENT MAJOR DEPRESSIVE DISORDER (HCC): Chronic | ICD-10-CM

## 2025-07-09 DIAGNOSIS — M25.511 PAIN OF BOTH SHOULDER JOINTS: ICD-10-CM

## 2025-07-09 DIAGNOSIS — M25.512 PAIN OF BOTH SHOULDER JOINTS: ICD-10-CM

## 2025-07-09 PROBLEM — R55 SYNCOPE AND COLLAPSE: Status: RESOLVED | Noted: 2020-08-19 | Resolved: 2025-07-09

## 2025-07-09 PROCEDURE — 3078F DIAST BP <80 MM HG: CPT | Performed by: INTERNAL MEDICINE

## 2025-07-09 PROCEDURE — 99214 OFFICE O/P EST MOD 30 MIN: CPT | Performed by: INTERNAL MEDICINE

## 2025-07-09 PROCEDURE — 1123F ACP DISCUSS/DSCN MKR DOCD: CPT | Performed by: INTERNAL MEDICINE

## 2025-07-09 PROCEDURE — G8427 DOCREV CUR MEDS BY ELIG CLIN: HCPCS | Performed by: INTERNAL MEDICINE

## 2025-07-09 PROCEDURE — 3017F COLORECTAL CA SCREEN DOC REV: CPT | Performed by: INTERNAL MEDICINE

## 2025-07-09 PROCEDURE — G8417 CALC BMI ABV UP PARAM F/U: HCPCS | Performed by: INTERNAL MEDICINE

## 2025-07-09 PROCEDURE — 3074F SYST BP LT 130 MM HG: CPT | Performed by: INTERNAL MEDICINE

## 2025-07-09 PROCEDURE — 1036F TOBACCO NON-USER: CPT | Performed by: INTERNAL MEDICINE

## 2025-07-09 RX ORDER — ARIPIPRAZOLE 5 MG/1
5 TABLET ORAL DAILY
Qty: 30 TABLET | Refills: 3 | Status: SHIPPED | OUTPATIENT
Start: 2025-07-09

## 2025-07-09 RX ORDER — CELECOXIB 200 MG/1
200 CAPSULE ORAL DAILY
Qty: 30 CAPSULE | Refills: 3 | Status: SHIPPED | OUTPATIENT
Start: 2025-07-09

## 2025-07-09 SDOH — ECONOMIC STABILITY: FOOD INSECURITY: WITHIN THE PAST 12 MONTHS, THE FOOD YOU BOUGHT JUST DIDN'T LAST AND YOU DIDN'T HAVE MONEY TO GET MORE.: NEVER TRUE

## 2025-07-09 SDOH — ECONOMIC STABILITY: FOOD INSECURITY: WITHIN THE PAST 12 MONTHS, YOU WORRIED THAT YOUR FOOD WOULD RUN OUT BEFORE YOU GOT MONEY TO BUY MORE.: NEVER TRUE

## 2025-07-09 ASSESSMENT — PATIENT HEALTH QUESTIONNAIRE - PHQ9
SUM OF ALL RESPONSES TO PHQ QUESTIONS 1-9: 2
1. LITTLE INTEREST OR PLEASURE IN DOING THINGS: SEVERAL DAYS
2. FEELING DOWN, DEPRESSED OR HOPELESS: SEVERAL DAYS
SUM OF ALL RESPONSES TO PHQ QUESTIONS 1-9: 2

## 2025-07-09 NOTE — PROGRESS NOTES
Chief Complaint   Patient presents with    Follow-up Chronic Condition     Assessment/Plan:  Karri was seen today for follow-up chronic condition.    Diagnoses and all orders for this visit:    Parkinson's disease with dyskinesia and fluctuating manifestations (HCC)    Disabling essential tremor  Comments:  Overlaid on the Parkinson's makes it worse.    Primary insomnia    Pain of both shoulder joints  Comments:  Myalgia likely related to his depression.  Orders:  -     celecoxib (CELEBREX) 200 MG capsule; Take 1 capsule by mouth daily    Moderate episode of recurrent major depressive disorder (HCC)  Comments:  He does not want to take an anti-depressant.  Orders:  -     ARIPiprazole (ABILIFY) 5 MG tablet; Take 1 tablet by mouth daily    Weight loss  Comments:  This is partly intentional and he looks good.    Insomnia due to medical condition  -     ARIPiprazole (ABILIFY) 5 MG tablet; Take 1 tablet by mouth daily        Review of Systems        Vitals:    07/09/25 1055   BP: 122/72   Pulse: 57   SpO2: 98%       Physical Exam  Vitals reviewed.   Constitutional:       General: He is not in acute distress.     Appearance: He is well-developed. He is not diaphoretic.   HENT:      Head: Normocephalic and atraumatic.      Right Ear: Tympanic membrane, ear canal and external ear normal. There is no impacted cerumen.      Left Ear: Tympanic membrane, ear canal and external ear normal. There is no impacted cerumen.      Nose: Nose normal. No congestion or rhinorrhea.      Mouth/Throat:      Mouth: Mucous membranes are moist.      Pharynx: Oropharynx is clear. No oropharyngeal exudate or posterior oropharyngeal erythema.   Pulmonary:      Effort: Pulmonary effort is normal.   Abdominal:      General: Abdomen is flat.      Palpations: Abdomen is soft.   Musculoskeletal:         General: No swelling.      Right lower leg: No edema.      Left lower leg: No edema.   Skin:     General: Skin is warm and dry.   Neurological:

## (undated) DEVICE — SUTURE VCRL + SZ 3-0 L27IN ABSRB WHT CT-1 1/2 CIR VCP258H

## (undated) DEVICE — DRAIN SURG SGL COLL PT TB FOR ATS BG OASIS

## (undated) DEVICE — Device

## (undated) DEVICE — PROBE ABLATN NERVE BLOCK 180 DEG 8 MM BALL TIP CRYOSPHERE

## (undated) DEVICE — ELECTRODE LAP L36CM PTFE WIRE J HK CLEANCOAT

## (undated) DEVICE — 9165 UNIVERSAL PATIENT PLATE: Brand: 3M™

## (undated) DEVICE — Device: Brand: MEDEX

## (undated) DEVICE — 3M™ IOBAN™ 2 ANTIMICROBIAL INCISE DRAPE 6651EZ: Brand: IOBAN™ 2

## (undated) DEVICE — 3M™ STERI-DRAPE™ INSTRUMENT POUCH 1018: Brand: STERI-DRAPE™

## (undated) DEVICE — 3M™ IOBAN™ 2 ANTIMICROBIAL INCISE DRAPE 6650EZ: Brand: IOBAN™ 2

## (undated) DEVICE — CATH CTR VEN 3LUM INTRLNK INJ 9FRX11CM

## (undated) DEVICE — TOWEL,OR,DSP,ST,BLUE,STD,6/PK,12PK/CS: Brand: MEDLINE

## (undated) DEVICE — BW-412T DISP COMBO CLEANING BRUSH: Brand: SINGLE USE COMBINATION CLEANING BRUSH

## (undated) DEVICE — DISSECTOR LAP DIA5MM BLNT TIP ENDOPATH

## (undated) DEVICE — GUIDEWIRE VASC L150CM DIA0.035IN FLX END L7CM J 3MM PTFE

## (undated) DEVICE — TROCAR: Brand: KII SLEEVE

## (undated) DEVICE — BLANKET WRM W25XL64IN NONWOVEN SFT LTWT PLIABLE HYPR

## (undated) DEVICE — KIT ART LN 20GA L12CM FEP RADPQ 0.025X13.75IN SPR GWIRE

## (undated) DEVICE — DRAIN SURG 24FR L5/16IN DIA8MM SIL RND HUBLESS FULL FLUT

## (undated) DEVICE — SOLUTION IV IRRIG POUR BRL 0.9% SODIUM CHL 2F7124

## (undated) DEVICE — TROCAR: Brand: KII FIOS FIRST ENTRY

## (undated) DEVICE — SOLUTION IV IRRIG WATER 500ML POUR BRL ST 2F7113

## (undated) DEVICE — BLANKET WRM W40.2XL55.9IN IORT LO BODY + MISTRAL AIR

## (undated) DEVICE — 3M™ TEGADERM™ CHG DRESSING 25/CARTON 4 CARTONS/CASE 1660: Brand: TEGADERM™

## (undated) DEVICE — SUTURE MCRYL + SZ 4-0 L18IN ABSRB UD L19MM PS-2 3/8 CIR MCP496G

## (undated) DEVICE — OPEN HEART LINE SET UP: Brand: MEDLINE INDUSTRIES, INC.

## (undated) DEVICE — CONNECTOR PERF W0.25XH3/8IN BASE Y SHP REDUC W/O LUERLOCK

## (undated) DEVICE — SUTURE VCRL SZ 3-0 L27IN ABSRB UD L36MM CT-1 1/2 CIR J258H

## (undated) DEVICE — GLOVE ORANGE PI 7 1/2   MSG9075

## (undated) DEVICE — DISSECTOR ULTRASONIC L39CM CRV JAW CRDLSS SONICISION

## (undated) DEVICE — TRAP SPEC RETRV CLR PLAS POLYP IN LN SUCT QUIK CTCH

## (undated) DEVICE — SNARE ENDOSCP L240CM SHTH DIA24MM LOOP W10MM POLYP RND REINF

## (undated) DEVICE — SUTURE VCRL + SZ 0 L27IN ABSRB UD CT-1 L36MM 1/2 CIR TAPR VCP260H

## (undated) DEVICE — TRAY URIN CATH PED 16FR BLLN 5CC 2 CONN SIL STR TIP W/ URIN

## (undated) DEVICE — SUTURE VCRL + SZ 2-0 L27IN ABSRB VLT L26MM CT-2 1/2 CIR VCP333H

## (undated) DEVICE — GARMENT,MEDLINE,DVT,INT,CALF,MED, GEN2: Brand: MEDLINE

## (undated) DEVICE — MEDI-TRACE CADENCE ADULT, DEFIBRILLATION ELECTRODE -RTS  (10 PR/PK) - PHYSIO-CONTROL: Brand: MEDI-TRACE CADENCE

## (undated) DEVICE — SUTURE VCRL SZ 0 L36IN ABSRB UD CT-1 L36MM 1/2 CIR TAPR PNT VCP946H

## (undated) DEVICE — Z INACTIVE USE 2855096 SPONGE GZ W4XL4IN 8 PLY 100% COT

## (undated) DEVICE — DRAPE C ARM W46XL120IN XLN

## (undated) DEVICE — YANKAUER,OPEN TIP,W/O VENT,STERILE: Brand: MEDLINE INDUSTRIES, INC.

## (undated) DEVICE — SURGICAL PROCEDURE PACK IV U-BAR

## (undated) DEVICE — SUTURE ETHBND EXCEL SZ 0 L18IN NONABSORBABLE GRN L36MM CT-1 CX21D

## (undated) DEVICE — SUTURE NONABSORBABLE MONOFILAMENT 4-0 RB-1 36 IN BLU PROLENE 8557H

## (undated) DEVICE — BLADE ES ELASTOMERIC COAT INSUL DURABLE BEND UPTO 90DEG

## (undated) DEVICE — SET VLV 3 PC AWS DISPOSABLE GRDIAN SCOPEVALET

## (undated) DEVICE — [HIGH FLOW INSUFFLATOR,  DO NOT USE IF PACKAGE IS DAMAGED,  KEEP DRY,  KEEP AWAY FROM SUNLIGHT,  PROTECT FROM HEAT AND RADIOACTIVE SOURCES.]: Brand: PNEUMOSURE

## (undated) DEVICE — TUBING, SUCTION, 3/16" X 12', STRAIGHT: Brand: MEDLINE

## (undated) DEVICE — PROCEDURE KIT ENDOSCP CUST

## (undated) DEVICE — ADMINISTRATION SET PRIMARY 60 GTT 104 IN GRAVITY SMRTSITE

## (undated) DEVICE — ANTI-FOG SOLUTION WITH FOAM PAD: Brand: DEVON

## (undated) DEVICE — DRAIN,WOUND,ROUND,24FR,5/16",FULL-FLUTED: Brand: MEDLINE

## (undated) DEVICE — CHLORAPREP 26ML ORANGE

## (undated) DEVICE — CONNECTOR IV TB L28MM CLR VLV ACCS NDLLSS DISP MAXPLUS

## (undated) DEVICE — DEVICE COAG 3CM GUID 6130 FOR EPICARD ABLAT EPI-SENSE

## (undated) DEVICE — SYRINGE 20ML LL S/C 50

## (undated) DEVICE — Device: Brand: DISPOSABLE ELECTROSURGICAL SNARE

## (undated) DEVICE — DRESSING TRNSPAR W2XL2.75IN FLM SHT SEMIPERMEABLE WIND

## (undated) DEVICE — ADHESIVE SKIN CLSR 0.7ML TOP DERMBND ADV

## (undated) DEVICE — SUTURE PERMA-HAND SZ 0 L18IN NONABSORBABLE BLK L30MM FSL 678G